# Patient Record
Sex: MALE | Race: WHITE | NOT HISPANIC OR LATINO | Employment: OTHER | URBAN - NONMETROPOLITAN AREA
[De-identification: names, ages, dates, MRNs, and addresses within clinical notes are randomized per-mention and may not be internally consistent; named-entity substitution may affect disease eponyms.]

---

## 2019-10-01 ENCOUNTER — HOSPITAL ENCOUNTER (EMERGENCY)
Facility: HOSPITAL | Age: 74
Discharge: HOME | End: 2019-10-01
Attending: FAMILY MEDICINE | Admitting: FAMILY MEDICINE

## 2019-10-01 ENCOUNTER — APPOINTMENT (OUTPATIENT)
Dept: GENERAL RADIOLOGY | Facility: HOSPITAL | Age: 74
End: 2019-10-01

## 2019-10-01 VITALS
BODY MASS INDEX: 32.96 KG/M2 | SYSTOLIC BLOOD PRESSURE: 151 MMHG | WEIGHT: 210 LBS | DIASTOLIC BLOOD PRESSURE: 78 MMHG | RESPIRATION RATE: 20 BRPM | OXYGEN SATURATION: 97 % | HEIGHT: 67 IN | TEMPERATURE: 98.3 F | HEART RATE: 86 BPM

## 2019-10-01 DIAGNOSIS — S61.313A LACERATION OF LEFT MIDDLE FINGER WITHOUT FOREIGN BODY WITH DAMAGE TO NAIL, INITIAL ENCOUNTER: Primary | ICD-10-CM

## 2019-10-01 PROCEDURE — 90471 IMMUNIZATION ADMIN: CPT | Performed by: STUDENT IN AN ORGANIZED HEALTH CARE EDUCATION/TRAINING PROGRAM

## 2019-10-01 PROCEDURE — 99283 EMERGENCY DEPT VISIT LOW MDM: CPT

## 2019-10-01 PROCEDURE — 90715 TDAP VACCINE 7 YRS/> IM: CPT | Performed by: STUDENT IN AN ORGANIZED HEALTH CARE EDUCATION/TRAINING PROGRAM

## 2019-10-01 PROCEDURE — 73140 X-RAY EXAM OF FINGER(S): CPT

## 2019-10-01 PROCEDURE — 25010000002 TDAP 5-2.5-18.5 LF-MCG/0.5 SUSPENSION: Performed by: STUDENT IN AN ORGANIZED HEALTH CARE EDUCATION/TRAINING PROGRAM

## 2019-10-01 RX ORDER — CEPHALEXIN 500 MG/1
500 CAPSULE ORAL 2 TIMES DAILY
Qty: 14 CAPSULE | Refills: 0 | Status: SHIPPED | OUTPATIENT
Start: 2019-10-01 | End: 2021-11-16 | Stop reason: HOSPADM

## 2019-10-01 RX ORDER — GINSENG 100 MG
CAPSULE ORAL 2 TIMES DAILY
Qty: 28 G | Refills: 0 | Status: SHIPPED | OUTPATIENT
Start: 2019-10-01 | End: 2022-02-10

## 2019-10-01 RX ORDER — DIAPER,BRIEF,INFANT-TODD,DISP
EACH MISCELLANEOUS
Status: COMPLETED
Start: 2019-10-01 | End: 2019-10-01

## 2019-10-01 RX ORDER — LIDOCAINE HYDROCHLORIDE 10 MG/ML
10 INJECTION, SOLUTION EPIDURAL; INFILTRATION; INTRACAUDAL; PERINEURAL ONCE
Status: COMPLETED | OUTPATIENT
Start: 2019-10-01 | End: 2019-10-01

## 2019-10-01 RX ORDER — DIAPER,BRIEF,INFANT-TODD,DISP
EACH MISCELLANEOUS ONCE
Status: COMPLETED | OUTPATIENT
Start: 2019-10-01 | End: 2019-10-01

## 2019-10-01 RX ORDER — LIDOCAINE HYDROCHLORIDE 10 MG/ML
INJECTION, SOLUTION EPIDURAL; INFILTRATION; INTRACAUDAL; PERINEURAL
Status: COMPLETED
Start: 2019-10-01 | End: 2019-10-01

## 2019-10-01 RX ADMIN — LIDOCAINE HYDROCHLORIDE 10 ML: 10 INJECTION, SOLUTION EPIDURAL; INFILTRATION; INTRACAUDAL at 17:46

## 2019-10-01 RX ADMIN — Medication: at 18:05

## 2019-10-01 RX ADMIN — TETANUS TOXOID, REDUCED DIPHTHERIA TOXOID AND ACELLULAR PERTUSSIS VACCINE, ADSORBED 0.5 ML: 5; 2.5; 8; 8; 2.5 SUSPENSION INTRAMUSCULAR at 18:00

## 2019-10-01 RX ADMIN — LIDOCAINE HYDROCHLORIDE 10 ML: 10 INJECTION, SOLUTION EPIDURAL; INFILTRATION; INTRACAUDAL; PERINEURAL at 17:46

## 2019-10-01 RX ADMIN — BACITRACIN ZINC: 500 OINTMENT TOPICAL at 18:05

## 2019-10-01 NOTE — ED PROVIDER NOTES
Subjective   History of Present Illness  Patient states he was using a saw and cut his two fingers ( middle and index finger) on his left hand. Patient states he does not have any difficulty moving his fingers. Patient has some numbness of his middle finger. Has not had tetanus shot in over 20 years.    Review of Systems   Constitutional: Negative for activity change, appetite change, chills, fatigue and fever.   HENT: Negative for drooling, ear discharge, ear pain, facial swelling, hearing loss, mouth sores, rhinorrhea and sinus pain.    Eyes: Negative for pain, redness and itching.   Respiratory: Negative for cough, choking, chest tightness, shortness of breath and stridor.    Cardiovascular: Negative for chest pain, palpitations and leg swelling.   Gastrointestinal: Negative for abdominal distention, abdominal pain, anal bleeding, blood in stool, constipation, diarrhea and nausea.   Endocrine: Negative for heat intolerance, polydipsia and polyphagia.   Genitourinary: Negative for dysuria, flank pain, frequency and genital sores.   Musculoskeletal: Negative for back pain, gait problem, joint swelling and myalgias.   Skin: Positive for wound. Negative for pallor and rash.   Neurological: Negative for seizures, facial asymmetry, speech difficulty, light-headedness, numbness and headaches.   Hematological: Negative for adenopathy. Does not bruise/bleed easily.   Psychiatric/Behavioral: Negative for confusion, decreased concentration, dysphoric mood and hallucinations. The patient is not nervous/anxious and is not hyperactive.        No past medical history on file.    Allergies   Allergen Reactions   • Levofloxacin Anaphylaxis       No past surgical history on file.    No family history on file.    Social History     Socioeconomic History   • Marital status:      Spouse name: Not on file   • Number of children: Not on file   • Years of education: Not on file   • Highest education level: Not on file            Objective   Physical Exam   Constitutional: He is oriented to person, place, and time. He appears well-developed and well-nourished.   HENT:   Head: Normocephalic and atraumatic.   Right Ear: External ear normal.   Left Ear: External ear normal.   Nose: Nose normal.   Mouth/Throat: Oropharynx is clear and moist.   Eyes: Conjunctivae and EOM are normal. Pupils are equal, round, and reactive to light.   Neck: Normal range of motion. Neck supple.   Cardiovascular: Normal rate, regular rhythm, normal heart sounds and intact distal pulses.   Pulmonary/Chest: Effort normal and breath sounds normal.   Abdominal: Soft. Bowel sounds are normal.   Musculoskeletal: Normal range of motion.   Neurological: He is alert and oriented to person, place, and time.   Skin: Skin is warm and dry.   Distal phalanx palmar aspect of index and middle finger cut   Psychiatric: He has a normal mood and affect. His behavior is normal. Judgment and thought content normal.       Laceration Repair  Date/Time: 10/14/2019 6:44 PM  Performed by: Kim Dobbs MD  Authorized by: Sebastian Carter MD     Consent:     Consent obtained:  Verbal    Consent given by:  Patient    Risks discussed:  Infection and pain    Alternatives discussed:  Delayed treatment, no treatment and observation  Anesthesia (see MAR for exact dosages):     Anesthesia method:  Local infiltration    Local anesthetic:  Lidocaine 1% w/o epi  Laceration details:     Location:  Finger    Finger location:  L long finger  Repair type:     Repair type:  Simple  Pre-procedure details:     Preparation:  Patient was prepped and draped in usual sterile fashion  Exploration:     Hemostasis achieved with:  Direct pressure    Wound exploration: wound explored through full range of motion      Contaminated: yes    Treatment:     Area cleansed with:  Saline    Amount of cleaning:  Standard    Irrigation solution:  Sterile saline    Irrigation method:  Pressure wash    Visualized  foreign bodies/material removed: no    Skin repair:     Repair method:  Sutures    Suture size:  4-0    Suture material:  Fast-absorbing gut    Suture technique:  Simple interrupted  Approximation:     Approximation:  Close    Vermilion border: well-aligned    Post-procedure details:     Dressing:  Antibiotic ointment    Patient tolerance of procedure:  Tolerated well, no immediate complications               ED Course  ED Course as of Oct 02 1031   Wed Oct 02, 2019   1030 Applied 3-4 stiches per finger. Patient tolerated the procedure well. No complications. Told patient to follow up with dr light within one week. Given patient tetanus shot, bacitracin, and kephlex.   [SA]      ED Course User Index  [SA] Kim Dobbs MD                  [facility]    Final diagnoses:   Laceration of left middle finger without foreign body with damage to nail, initial encounter          Kim Dobbs M.D. PGY2  [facility]  [address] [place] [address]  Office: [phone]    This document has been electronically signed by Kim Dobbs MD on October 2, 2019 10:31 AM             Kim Dobbs MD  Resident  10/02/19 1032       Kim Dobbs MD  Resident  10/14/19 4112

## 2019-10-01 NOTE — ED NOTES
Fingers soaking in normal saline and betadine soak basin.      Gabriela Singh, RN  10/01/19 5565

## 2019-10-01 NOTE — ED NOTES
Sutures cleaned with normal saline.  Bacitracin applied.  Gauze and coban dressing to cover.     Gabriela Singh RN  10/01/19 0356

## 2020-03-09 ENCOUNTER — APPOINTMENT (OUTPATIENT)
Dept: GENERAL RADIOLOGY | Facility: HOSPITAL | Age: 75
End: 2020-03-09

## 2020-03-09 ENCOUNTER — HOSPITAL ENCOUNTER (EMERGENCY)
Facility: HOSPITAL | Age: 75
Discharge: ANOTHER HOSPITAL | End: 2020-03-09
Attending: EMERGENCY MEDICINE | Admitting: EMERGENCY MEDICINE

## 2020-03-09 ENCOUNTER — APPOINTMENT (OUTPATIENT)
Dept: CT IMAGING | Facility: HOSPITAL | Age: 75
End: 2020-03-09

## 2020-03-09 VITALS
OXYGEN SATURATION: 96 % | WEIGHT: 198 LBS | HEIGHT: 67 IN | DIASTOLIC BLOOD PRESSURE: 60 MMHG | SYSTOLIC BLOOD PRESSURE: 124 MMHG | TEMPERATURE: 98.3 F | HEART RATE: 102 BPM | RESPIRATION RATE: 20 BRPM | BODY MASS INDEX: 31.08 KG/M2

## 2020-03-09 DIAGNOSIS — Z94.1 HISTORY OF HEART TRANSPLANT (HCC): ICD-10-CM

## 2020-03-09 DIAGNOSIS — R65.20 SEPSIS WITH ACUTE RENAL FAILURE WITHOUT SEPTIC SHOCK, DUE TO UNSPECIFIED ORGANISM, UNSPECIFIED ACUTE RENAL FAILURE TYPE (HCC): ICD-10-CM

## 2020-03-09 DIAGNOSIS — N17.9 SEPSIS WITH ACUTE RENAL FAILURE WITHOUT SEPTIC SHOCK, DUE TO UNSPECIFIED ORGANISM, UNSPECIFIED ACUTE RENAL FAILURE TYPE (HCC): ICD-10-CM

## 2020-03-09 DIAGNOSIS — K45.0 INCARCERATED HERNIA OF ABDOMINAL CAVITY: Primary | ICD-10-CM

## 2020-03-09 DIAGNOSIS — A41.9 SEPSIS WITH ACUTE RENAL FAILURE WITHOUT SEPTIC SHOCK, DUE TO UNSPECIFIED ORGANISM, UNSPECIFIED ACUTE RENAL FAILURE TYPE (HCC): ICD-10-CM

## 2020-03-09 LAB
ALBUMIN SERPL-MCNC: 4.4 G/DL (ref 3.5–5.2)
ALBUMIN/GLOB SERPL: 1 G/DL
ALP SERPL-CCNC: 77 U/L (ref 39–117)
ALT SERPL W P-5'-P-CCNC: 10 U/L (ref 1–41)
ANION GAP SERPL CALCULATED.3IONS-SCNC: 23 MMOL/L (ref 5–15)
AST SERPL-CCNC: 23 U/L (ref 1–40)
BACTERIA UR QL AUTO: ABNORMAL /HPF
BASOPHILS # BLD AUTO: 0.03 10*3/MM3 (ref 0–0.2)
BASOPHILS NFR BLD AUTO: 0.3 % (ref 0–1.5)
BILIRUB SERPL-MCNC: 0.9 MG/DL (ref 0.2–1.2)
BILIRUB UR QL STRIP: NEGATIVE
BUN BLD-MCNC: 69 MG/DL (ref 8–23)
BUN/CREAT SERPL: 18.8 (ref 7–25)
CALCIUM SPEC-SCNC: 9.3 MG/DL (ref 8.6–10.5)
CHLORIDE SERPL-SCNC: 87 MMOL/L (ref 98–107)
CLARITY UR: CLEAR
CO2 SERPL-SCNC: 29 MMOL/L (ref 22–29)
COLOR UR: YELLOW
CREAT BLD-MCNC: 3.67 MG/DL (ref 0.76–1.27)
D-LACTATE SERPL-SCNC: 4 MMOL/L (ref 0.5–2)
DEPRECATED RDW RBC AUTO: 42.3 FL (ref 37–54)
DOHLE BODIES: PRESENT
EOSINOPHIL # BLD AUTO: 0.02 10*3/MM3 (ref 0–0.4)
EOSINOPHIL NFR BLD AUTO: 0.2 % (ref 0.3–6.2)
ERYTHROCYTE [DISTWIDTH] IN BLOOD BY AUTOMATED COUNT: 13.7 % (ref 12.3–15.4)
GFR SERPL CREATININE-BSD FRML MDRD: 16 ML/MIN/1.73
GLOBULIN UR ELPH-MCNC: 4.5 GM/DL
GLUCOSE BLD-MCNC: 199 MG/DL (ref 65–99)
GLUCOSE UR STRIP-MCNC: ABNORMAL MG/DL
HCT VFR BLD AUTO: 38.2 % (ref 37.5–51)
HGB BLD-MCNC: 12.7 G/DL (ref 13–17.7)
HGB UR QL STRIP.AUTO: NEGATIVE
HOLD SPECIMEN: NORMAL
HOLD SPECIMEN: NORMAL
HYALINE CASTS UR QL AUTO: ABNORMAL /LPF
IMM GRANULOCYTES # BLD AUTO: 0.09 10*3/MM3 (ref 0–0.05)
IMM GRANULOCYTES NFR BLD AUTO: 0.8 % (ref 0–0.5)
KETONES UR QL STRIP: NEGATIVE
LACTATE HOLD SPECIMEN: NORMAL
LEUKOCYTE ESTERASE UR QL STRIP.AUTO: NEGATIVE
LIPASE SERPL-CCNC: 8 U/L (ref 13–60)
LYMPHOCYTES # BLD AUTO: 1.5 10*3/MM3 (ref 0.7–3.1)
LYMPHOCYTES # BLD MANUAL: 1.86 10*3/MM3 (ref 0.7–3.1)
LYMPHOCYTES NFR BLD AUTO: 12.9 % (ref 19.6–45.3)
LYMPHOCYTES NFR BLD MANUAL: 14 % (ref 5–12)
LYMPHOCYTES NFR BLD MANUAL: 16 % (ref 19.6–45.3)
MCH RBC QN AUTO: 28 PG (ref 26.6–33)
MCHC RBC AUTO-ENTMCNC: 33.2 G/DL (ref 31.5–35.7)
MCV RBC AUTO: 84.3 FL (ref 79–97)
MONOCYTES # BLD AUTO: 1.52 10*3/MM3 (ref 0.1–0.9)
MONOCYTES # BLD AUTO: 1.63 10*3/MM3 (ref 0.1–0.9)
MONOCYTES NFR BLD AUTO: 13 % (ref 5–12)
NEUTROPHILS # BLD AUTO: 8.16 10*3/MM3 (ref 1.7–7)
NEUTROPHILS # BLD AUTO: 8.49 10*3/MM3 (ref 1.7–7)
NEUTROPHILS NFR BLD AUTO: 72.8 % (ref 42.7–76)
NEUTROPHILS NFR BLD MANUAL: 45 % (ref 42.7–76)
NEUTS BAND NFR BLD MANUAL: 25 % (ref 0–5)
NITRITE UR QL STRIP: NEGATIVE
NRBC BLD AUTO-RTO: 0 /100 WBC (ref 0–0.2)
NT-PROBNP SERPL-MCNC: 3224 PG/ML (ref 5–1800)
PH UR STRIP.AUTO: 6.5 [PH] (ref 5–9)
PLAT MORPH BLD: NORMAL
PLATELET # BLD AUTO: 235 10*3/MM3 (ref 140–450)
PMV BLD AUTO: 9.7 FL (ref 6–12)
POTASSIUM BLD-SCNC: 3.3 MMOL/L (ref 3.5–5.2)
PROT SERPL-MCNC: 8.9 G/DL (ref 6–8.5)
PROT UR QL STRIP: ABNORMAL
RBC # BLD AUTO: 4.53 10*6/MM3 (ref 4.14–5.8)
RBC # UR: ABNORMAL /HPF
RBC MORPH BLD: NORMAL
REF LAB TEST METHOD: ABNORMAL
SODIUM BLD-SCNC: 139 MMOL/L (ref 136–145)
SP GR UR STRIP: 1.02 (ref 1–1.03)
SQUAMOUS #/AREA URNS HPF: ABNORMAL /HPF
TOXIC GRANULATION: ABNORMAL
TROPONIN T SERPL-MCNC: 0.03 NG/ML (ref 0–0.03)
UROBILINOGEN UR QL STRIP: ABNORMAL
WBC NRBC COR # BLD: 11.65 10*3/MM3 (ref 3.4–10.8)
WBC UR QL AUTO: ABNORMAL /HPF
WHOLE BLOOD HOLD SPECIMEN: NORMAL
WHOLE BLOOD HOLD SPECIMEN: NORMAL

## 2020-03-09 PROCEDURE — 25010000002 MORPHINE PER 10 MG: Performed by: EMERGENCY MEDICINE

## 2020-03-09 PROCEDURE — P9612 CATHETERIZE FOR URINE SPEC: HCPCS

## 2020-03-09 PROCEDURE — 96365 THER/PROPH/DIAG IV INF INIT: CPT

## 2020-03-09 PROCEDURE — 74176 CT ABD & PELVIS W/O CONTRAST: CPT

## 2020-03-09 PROCEDURE — 84484 ASSAY OF TROPONIN QUANT: CPT | Performed by: EMERGENCY MEDICINE

## 2020-03-09 PROCEDURE — 96375 TX/PRO/DX INJ NEW DRUG ADDON: CPT

## 2020-03-09 PROCEDURE — 99285 EMERGENCY DEPT VISIT HI MDM: CPT

## 2020-03-09 PROCEDURE — 83605 ASSAY OF LACTIC ACID: CPT | Performed by: EMERGENCY MEDICINE

## 2020-03-09 PROCEDURE — 81001 URINALYSIS AUTO W/SCOPE: CPT | Performed by: EMERGENCY MEDICINE

## 2020-03-09 PROCEDURE — 83880 ASSAY OF NATRIURETIC PEPTIDE: CPT | Performed by: EMERGENCY MEDICINE

## 2020-03-09 PROCEDURE — 80053 COMPREHEN METABOLIC PANEL: CPT | Performed by: EMERGENCY MEDICINE

## 2020-03-09 PROCEDURE — 83690 ASSAY OF LIPASE: CPT | Performed by: EMERGENCY MEDICINE

## 2020-03-09 PROCEDURE — 25010000002 PIPERACILLIN SOD-TAZOBACTAM PER 1 G: Performed by: EMERGENCY MEDICINE

## 2020-03-09 PROCEDURE — 85007 BL SMEAR W/DIFF WBC COUNT: CPT | Performed by: EMERGENCY MEDICINE

## 2020-03-09 PROCEDURE — 25010000002 ONDANSETRON PER 1 MG: Performed by: EMERGENCY MEDICINE

## 2020-03-09 PROCEDURE — 71045 X-RAY EXAM CHEST 1 VIEW: CPT

## 2020-03-09 PROCEDURE — 85025 COMPLETE CBC W/AUTO DIFF WBC: CPT | Performed by: EMERGENCY MEDICINE

## 2020-03-09 PROCEDURE — 87040 BLOOD CULTURE FOR BACTERIA: CPT | Performed by: EMERGENCY MEDICINE

## 2020-03-09 PROCEDURE — 96361 HYDRATE IV INFUSION ADD-ON: CPT

## 2020-03-09 RX ORDER — ONDANSETRON 2 MG/ML
4 INJECTION INTRAMUSCULAR; INTRAVENOUS ONCE
Status: COMPLETED | OUTPATIENT
Start: 2020-03-09 | End: 2020-03-09

## 2020-03-09 RX ORDER — EZETIMIBE 10 MG/1
10 TABLET ORAL NIGHTLY
COMMUNITY
Start: 2018-10-04

## 2020-03-09 RX ORDER — HYDRALAZINE HYDROCHLORIDE 10 MG/1
10 TABLET, FILM COATED ORAL 3 TIMES DAILY
COMMUNITY
End: 2022-02-10

## 2020-03-09 RX ORDER — INSULIN GLARGINE 100 [IU]/ML
30 INJECTION, SOLUTION SUBCUTANEOUS 2 TIMES DAILY
COMMUNITY
Start: 2018-10-04

## 2020-03-09 RX ORDER — METOPROLOL SUCCINATE 50 MG/1
50 TABLET, EXTENDED RELEASE ORAL DAILY
COMMUNITY
Start: 2018-10-05

## 2020-03-09 RX ORDER — CYCLOSPORINE 100 MG/1
100 CAPSULE, LIQUID FILLED ORAL 2 TIMES DAILY
COMMUNITY

## 2020-03-09 RX ORDER — DOCUSATE SODIUM 100 MG/1
100 CAPSULE, LIQUID FILLED ORAL AS NEEDED
COMMUNITY

## 2020-03-09 RX ORDER — DOXAZOSIN 8 MG/1
8 TABLET ORAL 2 TIMES DAILY
COMMUNITY

## 2020-03-09 RX ORDER — GABAPENTIN 100 MG/1
200 CAPSULE ORAL 3 TIMES DAILY
COMMUNITY
Start: 2018-10-04

## 2020-03-09 RX ORDER — SULFAMETHOXAZOLE AND TRIMETHOPRIM 800; 160 MG/1; MG/1
1 TABLET ORAL WEEKLY
Status: ON HOLD | COMMUNITY
End: 2021-12-17

## 2020-03-09 RX ORDER — CITALOPRAM 20 MG/1
20 TABLET ORAL DAILY
COMMUNITY
Start: 2018-10-04 | End: 2022-02-10

## 2020-03-09 RX ORDER — ISOSORBIDE DINITRATE 20 MG/1
20 TABLET ORAL 3 TIMES DAILY
COMMUNITY
Start: 2018-10-04

## 2020-03-09 RX ORDER — FUROSEMIDE 40 MG/1
40 TABLET ORAL DAILY
Status: ON HOLD | COMMUNITY
End: 2021-12-17 | Stop reason: SDUPTHER

## 2020-03-09 RX ORDER — ROSUVASTATIN CALCIUM 5 MG/1
5 TABLET, COATED ORAL NIGHTLY
COMMUNITY
End: 2022-02-12 | Stop reason: HOSPADM

## 2020-03-09 RX ORDER — CHLORAL HYDRATE 500 MG
1 CAPSULE ORAL 2 TIMES DAILY WITH MEALS
COMMUNITY
Start: 2018-10-04

## 2020-03-09 RX ORDER — CALCITRIOL 0.25 UG/1
0.25 CAPSULE, LIQUID FILLED ORAL EVERY OTHER DAY
COMMUNITY

## 2020-03-09 RX ORDER — SODIUM CHLORIDE 0.9 % (FLUSH) 0.9 %
10 SYRINGE (ML) INJECTION AS NEEDED
Status: DISCONTINUED | OUTPATIENT
Start: 2020-03-09 | End: 2020-03-09 | Stop reason: HOSPADM

## 2020-03-09 RX ORDER — AMLODIPINE BESYLATE 10 MG/1
5 TABLET ORAL DAILY
COMMUNITY

## 2020-03-09 RX ORDER — SODIUM CHLORIDE 9 MG/ML
125 INJECTION, SOLUTION INTRAVENOUS CONTINUOUS
Status: DISCONTINUED | OUTPATIENT
Start: 2020-03-09 | End: 2020-03-09 | Stop reason: HOSPADM

## 2020-03-09 RX ORDER — ASPIRIN 81 MG/1
81 TABLET, CHEWABLE ORAL DAILY
Status: ON HOLD | COMMUNITY
Start: 2018-10-05 | End: 2022-02-12 | Stop reason: SDUPTHER

## 2020-03-09 RX ADMIN — SODIUM CHLORIDE 500 ML: 9 INJECTION, SOLUTION INTRAVENOUS at 14:50

## 2020-03-09 RX ADMIN — MORPHINE SULFATE 4 MG: 4 INJECTION, SOLUTION INTRAMUSCULAR; INTRAVENOUS at 14:51

## 2020-03-09 RX ADMIN — PIPERACILLIN SODIUM AND TAZOBACTAM SODIUM 3.38 G: 3; .375 INJECTION, POWDER, LYOPHILIZED, FOR SOLUTION INTRAVENOUS at 15:55

## 2020-03-09 RX ADMIN — SODIUM CHLORIDE 2694 ML: 9 INJECTION, SOLUTION INTRAVENOUS at 15:23

## 2020-03-09 RX ADMIN — ONDANSETRON 4 MG: 2 INJECTION INTRAMUSCULAR; INTRAVENOUS at 14:50

## 2020-03-09 NOTE — ED TRIAGE NOTES
Pt states he has been vomiting all weekend and a periumbilical hernia that is protruding more than normal. Pt has a minor bruise to periumbilical area.

## 2020-03-09 NOTE — ED NOTES
Patient is a  and is seen at [facility].  [facility] however the MD is recommending patient return to the hospital where he had a heart transplant which he said was [facility] due to patient being high risk..  He has not been taking his medications for 4-5 days.  Dr Espinal stated he needs to get there asap and is asking for air transport.

## 2020-03-09 NOTE — ED PROVIDER NOTES
Subjective   74-year-old male presents the emergency department accompanied by family with concern for abdominal pain, and vomiting.  Patient has history of heart transplant approximately 19 years ago.  Continues on immunosuppressive medications however he has been unable to take any of his medicines other than his insulin for the last 4 days.  Reports that he cannot keep anything down.  States that he has a known umbilical hernia that fixed itself a couple years ago and has not had any other problems but today has bulging in the area of his umbilicus with redness around the abdomen and significant pain and tenderness.  States that it has been 4 days since he has passed any gas or stool. Denies fever, chest pain or shortness of breath.     Family history, surgical history, social history, current medications and allergies are reviewed with the patient and triage documentation and vitals are reviewed.      History provided by:  Patient and relative   used: No        Review of Systems   Constitutional: Positive for appetite change. Negative for chills and fever.   HENT: Negative.    Eyes: Negative.    Respiratory: Negative for cough, shortness of breath and wheezing.    Cardiovascular: Negative for chest pain, palpitations and leg swelling.   Gastrointestinal: Positive for abdominal distention, abdominal pain, constipation and nausea. Negative for diarrhea and vomiting.   Endocrine: Negative for polydipsia, polyphagia and polyuria.   Genitourinary: Negative for dysuria, frequency and urgency.   Musculoskeletal: Negative for arthralgias, back pain, myalgias and neck pain.   Skin: Positive for color change (erythema on abdomen). Negative for pallor, rash and wound.   Allergic/Immunologic: Negative.    Neurological: Negative.    Hematological: Negative.    Psychiatric/Behavioral: Negative.        Past Medical History:   Diagnosis Date   • Diabetes mellitus (CMS/[facility])        Allergies   Allergen  Reactions   • Levofloxacin Anaphylaxis       Past Surgical History:   Procedure Laterality Date   • HEART TRANSPLANT         History reviewed. No pertinent family history.    Social History     Socioeconomic History   • Marital status:      Spouse name: Not on file   • Number of children: Not on file   • Years of education: Not on file   • Highest education level: Not on file   Tobacco Use   • Smoking status: Former Smoker   • Smokeless tobacco: Former User   Substance and Sexual Activity   • Alcohol use: Never     Frequency: Never   • Drug use: Never           Objective   Physical Exam   Constitutional: He is oriented to person, place, and time. He appears well-developed and well-nourished. He appears ill. No distress.   HENT:   Head: Normocephalic.   Mouth/Throat: Oropharynx is clear and moist.   Eyes: Pupils are equal, round, and reactive to light. No scleral icterus.   Cardiovascular: Intact distal pulses.  No extrasystoles are present. Tachycardia present. Exam reveals no gallop and no friction rub.   No murmur heard.  Pulses:       Radial pulses are 2+ on the right side, and 2+ on the left side.        Dorsalis pedis pulses are 2+ on the right side, and 2+ on the left side.   Pulmonary/Chest: Effort normal and breath sounds normal. He has no wheezes. He has no rales.   Abdominal: He exhibits distension. Bowel sounds are absent. There is no hepatosplenomegaly. There is generalized tenderness and tenderness in the periumbilical area. There is no rigidity, no rebound and no guarding. A hernia (umbilical) is present.   Neurological: He is alert and oriented to person, place, and time.   Skin: Skin is warm. Capillary refill takes less than 2 seconds. He is diaphoretic. There is erythema (on abdomen).   Psychiatric: His mood appears anxious.   Nursing note and vitals reviewed.      Procedures  none         ED Course      Labs Reviewed   COMPREHENSIVE METABOLIC PANEL - Abnormal; Notable for the following  components:       Result Value    Glucose 199 (*)     BUN 69 (*)     Creatinine 3.67 (*)     Potassium 3.3 (*)     Chloride 87 (*)     Total Protein 8.9 (*)     eGFR Non  Amer 16 (*)     Anion Gap 23.0 (*)     All other components within normal limits    Narrative:     GFR Normal >60  Chronic Kidney Disease <60  Kidney Failure <15     LIPASE - Abnormal; Notable for the following components:    Lipase 8 (*)     All other components within normal limits   URINALYSIS W/ MICROSCOPIC IF INDICATED (NO CULTURE) - Abnormal; Notable for the following components:    Glucose,  mg/dL (Trace) (*)     Protein, UA >=300 mg/dL (3+) (*)     All other components within normal limits   CBC WITH AUTO DIFFERENTIAL - Abnormal; Notable for the following components:    WBC 11.65 (*)     Hemoglobin 12.7 (*)     Lymphocyte % 12.9 (*)     Monocyte % 13.0 (*)     Eosinophil % 0.2 (*)     Immature Grans % 0.8 (*)     Neutrophils, Absolute 8.49 (*)     Monocytes, Absolute 1.52 (*)     Immature Grans, Absolute 0.09 (*)     All other components within normal limits   LACTIC ACID, PLASMA - Abnormal; Notable for the following components:    Lactate 4.0 (*)     All other components within normal limits   BNP (IN-HOUSE) - Abnormal; Notable for the following components:    proBNP 3,224.0 (*)     All other components within normal limits    Narrative:     Among patients with dyspnea, NT-proBNP is highly sensitive for the detection of acute congestive heart failure. In addition NT-proBNP of <300 pg/ml effectively rules out acute congestive heart failure with 99% negative predictive value.    Results may be falsely decreased if patient taking Biotin.     URINALYSIS, MICROSCOPIC ONLY - Abnormal; Notable for the following components:    RBC, UA 3-5 (*)     All other components within normal limits   MANUAL DIFFERENTIAL - Abnormal; Notable for the following components:    Lymphocyte % 16.0 (*)     Monocyte % 14.0 (*)     Bands %  25.0 (*)      Neutrophils Absolute 8.16 (*)     Monocytes Absolute 1.63 (*)     All other components within normal limits   TROPONIN (IN-HOUSE) - Normal    Narrative:     Troponin T Reference Range:  <= 0.03 ng/mL-   Negative for AMI  >0.03 ng/mL-     Abnormal for myocardial necrosis.  Clinicians would have to utilize clinical acumen, EKG, Troponin and serial changes to determine if it is an Acute Myocardial Infarction or myocardial injury due to an underlying chronic condition.       Results may be falsely decreased if patient taking Biotin.     BLOOD CULTURE   RAINBOW DRAW    Narrative:     The following orders were created for panel order [place] Draw.  Procedure                               Abnormality         Status                     ---------                               -----------         ------                     Light Blue Top[[id number]]                                   Final result               Green Top (Gel)[[id number]]                                  Final result               Lavender Top[[id number]]                                     Final result               Gold Top - SST[[id number]]                                   Final result                 Please view results for these tests on the individual orders.   LACTIC ACID REFLEX TIMER   CBC AND DIFFERENTIAL    Narrative:     The following orders were created for panel order CBC & Differential.  Procedure                               Abnormality         Status                     ---------                               -----------         ------                     CBC Auto Differential[[id number]]        Abnormal            Final result                 Please view results for these tests on the individual orders.   LIGHT BLUE TOP   GREEN TOP   LAVENDER TOP   GOLD TOP - SST     Ct Abdomen Pelvis Without Contrast    Result Date: 3/9/2020  Narrative: [facility] Patient Name: GOLD CARTER ATTENDING: WILL FRANCISCO REFERRING:  WILL FRANCISCO ORDERING: WILL FRANCISCO ----------------------- PROCEDURE: CT abdomen and pelvis without contrast DATE OF EXAM: 3/9/2020 HISTORY: Abdominal pain with vomiting, umbilical hernia. Axial images were obtained throughout the abdomen and pelvis without the use of intravenous and oral contrast. Reformatted sagittal and coronal image series were generated. This exam was performed according to our departmental dose-optimization program, which includes automated exposure control, adjustment of the mA and/or kV according to the patient's size and/or use of iterative reconstruction techniques with goal of optimizing doses that are As Low As Reasonably Achievable (ALARA).  No previous studies are available for comparison. FINDINGS: The visualized lungs bases are clear of infiltrates or effusions. The liver and spleen appear  homogenous and unremarkable.The kidneys show no renal stones or  calcifications and there is no evidence of hydronephrosis. The  pancreas and adrenals appear normal. The gallbladder is unremarkable. No biliary or pancreatic duct dilatation is seen. There is a periumbilical hernia containing a loop of small bowel. There apparently is a partial obstruction as the proximal loops appear fluid-filled and mildly distended. The loops distal to the hernia are not distended. No free fluid or inflammatory changes are seen in the abdomen. Diverticula are seen throughout the colon, most pronounced in the sigmoid colon. No acute inflammatory changes are seen. There is no evidence of retroperitoneal adenopathy. Appendix is normal. The abdominal aorta is unremarkable. The  pelvis shows no masses or adenopathy. Bladder and seminal vesicles appear normal. Prostate is not enlarged. No free fluid is seen. The inguinal regions are clear.     Impression: 1. Periumbilical hernia containing a loop of small bowel. There is dilatation of the small bowel loops proximal to the hernia, appearing fluid-filled with  air-fluid levels suggesting a partial obstruction. Distal small bowel and colon are nondistended. 2. Diverticulosis most pronounced in the sigmoid region. No inflammation or acute abnormalities are seen. Electronically signed by:  Saurav Wood MD  3/9/2020 4:20 PM CDT Workstation: [id number]    Xr Chest 1 View    Result Date: 3/9/2020  Narrative: PROCEDURE: XR CHEST 1 VW VIEWS:Single INDICATION: Abdominal pain, vomiting COMPARISON: None FINDINGS:   - lines/tubes: None. Median sternotomy wires are present. The superiormost wire is discontinuous   - cardiac: Size within normal limits.   - mediastinum: Contour within normal limits.   - lungs: No evidence of a focal air space process, pulmonary interstitial edema, nodule(s)/mass. Linear opacity left lung base probably represents small area of atelectasis or scarring   - pleura: No evidence of  fluid.    - osseous: Unremarkable for age.     Impression: Minimal suspected atelectasis or scarring at left lung base Electronically signed by:  Cadence Rodriguez MD  3/9/2020 4:30 PM CDT Workstation: [id number]          MDM  Number of Diagnoses or Management Options  History of heart transplant (CMS/[facility]):   Incarcerated hernia of abdominal cavity:   Sepsis with acute renal failure without septic shock, due to unspecified organism, unspecified acute renal failure type (CMS/HCC):      Amount and/or Complexity of Data Reviewed  Clinical lab tests: reviewed  Tests in the radiology section of CPT®: reviewed  Discuss the patient with other providers: yes    Patient Progress  Patient progress: stable    Patient's pain and nausea improved with medications in the emergency department.  With concern for infection and possible incarcerated hernia patient is started on IV antibiotics meeting sepsis criteria.  Lactic acid is 4 and patient is given 30 mL/kg fluid bolus.  Blood pressures remained stable.  CT scan reveals early partial small bowel obstruction with piece of small bowel and air within  the hernia.  With patient's presentation and findings concerning for incarcerated hernia.  Spoke with Dr. Trejo on-call for general surgery who is concerned about patient's history of heart transplant and lack of medications over the last 4 days.  Patient is felt stable enough for transfer to have surgical procedure at his facility where he follows for his heart transplant.  Contacted [facility] and spoke with  and patient was accepted to the emergency department under the care of Dr. Cam Leyva.  Patient and family are agreeable to transfer.  Due to patient's critical condition and multiple transfers at time of his need for transfer ambulances are not available and patient is transferred via helicopter.    Final diagnoses:   Incarcerated hernia of abdominal cavity   Sepsis with acute renal failure without septic shock, due to unspecified organism, unspecified acute renal failure type (CMS/[facility])   History of heart transplant (CMS/[facility])            Sandor Espinal,   03/09/20 1806    SEPTIC SHOCK FOCUSED EXAM    *Must be completed by a licensed independent Practitioner within 6 hours of diagnosis for the following conditions -- Septic Shock or Severe Sepsis with Lactate >/= 4.    Fluid bolus must be completed prior to assessment.      Diagnosis: severe sepsis     Vital Signs (Attestation) Reviewed Temp, HR, RR, BP, SpO2   Shock Index (HR/SBP) (Attestation) Reviewed    Urine Output (Attestation) Reviewed   General ill appearing   Respiratory Within Normal Limits   Cardiac/Chest no edema, no gallop, no JVD, no murmur and tachycardia   Skin (documentation of skin color is required) pink mucosa and warm/dry   Capillary Refill <3 seconds   Peripheral Pulses Checked                          radial  palpable and dorsal-pedis  palpable     † Septic shock is defined by CMS as severe sepsis plus one of the following: persistent hypotension after fluid bolus OR tissue hypoperfusion (Lactic Acid ?4)  ††  ONE OF THE FOLLOWING can be done in lieu of the Focused Exam: Measure CVP; Measure ScVO2; Echocardiogram (cardiac echo or cardiac ultrasound); Dynamic assessment of fluid responsiveness with passive leg raise or fluid challenge.    Sandor Espinal DO  03/09/20  6:07 PM       Sandor Espinal,   03/09/20 1800

## 2020-03-14 LAB — BACTERIA SPEC AEROBE CULT: NORMAL

## 2021-02-08 ENCOUNTER — IMMUNIZATION (OUTPATIENT)
Dept: VACCINE CLINIC | Facility: HOSPITAL | Age: 76
End: 2021-02-08

## 2021-02-08 PROCEDURE — 0001A: CPT | Performed by: THORACIC SURGERY (CARDIOTHORACIC VASCULAR SURGERY)

## 2021-02-08 PROCEDURE — 91300 HC SARSCOV02 VAC 30MCG/0.3ML IM: CPT | Performed by: THORACIC SURGERY (CARDIOTHORACIC VASCULAR SURGERY)

## 2021-03-01 ENCOUNTER — IMMUNIZATION (OUTPATIENT)
Dept: VACCINE CLINIC | Facility: HOSPITAL | Age: 76
End: 2021-03-01

## 2021-03-01 PROCEDURE — 0002A: CPT | Performed by: THORACIC SURGERY (CARDIOTHORACIC VASCULAR SURGERY)

## 2021-03-01 PROCEDURE — 91300 HC SARSCOV02 VAC 30MCG/0.3ML IM: CPT | Performed by: THORACIC SURGERY (CARDIOTHORACIC VASCULAR SURGERY)

## 2021-11-15 ENCOUNTER — APPOINTMENT (OUTPATIENT)
Dept: GENERAL RADIOLOGY | Facility: HOSPITAL | Age: 76
End: 2021-11-15

## 2021-11-15 ENCOUNTER — HOSPITAL ENCOUNTER (OUTPATIENT)
Facility: HOSPITAL | Age: 76
Setting detail: OBSERVATION
Discharge: HOME | End: 2021-11-16
Attending: EMERGENCY MEDICINE | Admitting: INTERNAL MEDICINE

## 2021-11-15 ENCOUNTER — APPOINTMENT (OUTPATIENT)
Dept: ULTRASOUND IMAGING | Facility: HOSPITAL | Age: 76
End: 2021-11-15

## 2021-11-15 ENCOUNTER — APPOINTMENT (OUTPATIENT)
Dept: CT IMAGING | Facility: HOSPITAL | Age: 76
End: 2021-11-15

## 2021-11-15 DIAGNOSIS — R42 ORTHOSTATIC DIZZINESS: ICD-10-CM

## 2021-11-15 DIAGNOSIS — R55 NEAR SYNCOPE: Primary | ICD-10-CM

## 2021-11-15 DIAGNOSIS — R51.0 ORTHOSTATIC HEADACHE: ICD-10-CM

## 2021-11-15 LAB
ALBUMIN SERPL-MCNC: 4.2 G/DL (ref 3.5–5.2)
ALBUMIN/GLOB SERPL: 1.2 G/DL
ALP SERPL-CCNC: 94 U/L (ref 39–117)
ALT SERPL W P-5'-P-CCNC: 12 U/L (ref 1–41)
ANION GAP SERPL CALCULATED.3IONS-SCNC: 12 MMOL/L (ref 5–15)
AST SERPL-CCNC: 19 U/L (ref 1–40)
BACTERIA UR QL AUTO: ABNORMAL /HPF
BASOPHILS # BLD AUTO: 0.04 10*3/MM3 (ref 0–0.2)
BASOPHILS NFR BLD AUTO: 0.6 % (ref 0–1.5)
BILIRUB SERPL-MCNC: 0.2 MG/DL (ref 0–1.2)
BILIRUB UR QL STRIP: NEGATIVE
BUN SERPL-MCNC: 38 MG/DL (ref 8–23)
BUN/CREAT SERPL: 16.5 (ref 7–25)
CALCIUM SPEC-SCNC: 8.7 MG/DL (ref 8.6–10.5)
CHLORIDE SERPL-SCNC: 100 MMOL/L (ref 98–107)
CLARITY UR: CLEAR
CO2 SERPL-SCNC: 25 MMOL/L (ref 22–29)
COLOR UR: YELLOW
CREAT SERPL-MCNC: 2.31 MG/DL (ref 0.76–1.27)
DEPRECATED RDW RBC AUTO: 37.2 FL (ref 37–54)
EOSINOPHIL # BLD AUTO: 0.1 10*3/MM3 (ref 0–0.4)
EOSINOPHIL NFR BLD AUTO: 1.6 % (ref 0.3–6.2)
ERYTHROCYTE [DISTWIDTH] IN BLOOD BY AUTOMATED COUNT: 12.8 % (ref 12.3–15.4)
FLUAV SUBTYP SPEC NAA+PROBE: NOT DETECTED
FLUBV RNA ISLT QL NAA+PROBE: NOT DETECTED
GFR SERPL CREATININE-BSD FRML MDRD: 28 ML/MIN/1.73
GLOBULIN UR ELPH-MCNC: 3.4 GM/DL
GLUCOSE BLDC GLUCOMTR-MCNC: 247 MG/DL (ref 70–130)
GLUCOSE SERPL-MCNC: 301 MG/DL (ref 65–99)
GLUCOSE UR STRIP-MCNC: ABNORMAL MG/DL
HCT VFR BLD AUTO: 32.9 % (ref 37.5–51)
HGB BLD-MCNC: 11 G/DL (ref 13–17.7)
HGB UR QL STRIP.AUTO: NEGATIVE
HOLD SPECIMEN: NORMAL
HYALINE CASTS UR QL AUTO: ABNORMAL /LPF
IMM GRANULOCYTES # BLD AUTO: 0.03 10*3/MM3 (ref 0–0.05)
IMM GRANULOCYTES NFR BLD AUTO: 0.5 % (ref 0–0.5)
KETONES UR QL STRIP: NEGATIVE
LEUKOCYTE ESTERASE UR QL STRIP.AUTO: NEGATIVE
LYMPHOCYTES # BLD AUTO: 1.79 10*3/MM3 (ref 0.7–3.1)
LYMPHOCYTES NFR BLD AUTO: 28.9 % (ref 19.6–45.3)
MAGNESIUM SERPL-MCNC: 1.8 MG/DL (ref 1.6–2.4)
MCH RBC QN AUTO: 27 PG (ref 26.6–33)
MCHC RBC AUTO-ENTMCNC: 33.4 G/DL (ref 31.5–35.7)
MCV RBC AUTO: 80.8 FL (ref 79–97)
MONOCYTES # BLD AUTO: 0.53 10*3/MM3 (ref 0.1–0.9)
MONOCYTES NFR BLD AUTO: 8.5 % (ref 5–12)
NEUTROPHILS NFR BLD AUTO: 3.71 10*3/MM3 (ref 1.7–7)
NEUTROPHILS NFR BLD AUTO: 59.9 % (ref 42.7–76)
NITRITE UR QL STRIP: NEGATIVE
NRBC BLD AUTO-RTO: 0 /100 WBC (ref 0–0.2)
PH UR STRIP.AUTO: 5.5 [PH] (ref 5–9)
PLATELET # BLD AUTO: 214 10*3/MM3 (ref 140–450)
PMV BLD AUTO: 9.6 FL (ref 6–12)
POTASSIUM SERPL-SCNC: 4.1 MMOL/L (ref 3.5–5.2)
PROT SERPL-MCNC: 7.6 G/DL (ref 6–8.5)
PROT UR QL STRIP: ABNORMAL
RBC # BLD AUTO: 4.07 10*6/MM3 (ref 4.14–5.8)
RBC # UR: ABNORMAL /HPF
REF LAB TEST METHOD: ABNORMAL
SARS-COV-2 RNA PNL SPEC NAA+PROBE: NOT DETECTED
SODIUM SERPL-SCNC: 137 MMOL/L (ref 136–145)
SP GR UR STRIP: 1.01 (ref 1–1.03)
SQUAMOUS #/AREA URNS HPF: ABNORMAL /HPF
UROBILINOGEN UR QL STRIP: ABNORMAL
WBC # BLD AUTO: 6.2 10*3/MM3 (ref 3.4–10.8)
WBC UR QL AUTO: ABNORMAL /HPF

## 2021-11-15 PROCEDURE — 63710000001 CYCLOSPORINE MODIFIED PER 25 MG: Performed by: NURSE PRACTITIONER

## 2021-11-15 PROCEDURE — 93005 ELECTROCARDIOGRAM TRACING: CPT | Performed by: PHYSICIAN ASSISTANT

## 2021-11-15 PROCEDURE — 70450 CT HEAD/BRAIN W/O DYE: CPT

## 2021-11-15 PROCEDURE — 83735 ASSAY OF MAGNESIUM: CPT | Performed by: PHYSICIAN ASSISTANT

## 2021-11-15 PROCEDURE — 63710000001 INSULIN DETEMIR PER 5 UNITS: Performed by: NURSE PRACTITIONER

## 2021-11-15 PROCEDURE — G0378 HOSPITAL OBSERVATION PER HR: HCPCS

## 2021-11-15 PROCEDURE — 93010 ELECTROCARDIOGRAM REPORT: CPT | Performed by: INTERNAL MEDICINE

## 2021-11-15 PROCEDURE — 99284 EMERGENCY DEPT VISIT MOD MDM: CPT

## 2021-11-15 PROCEDURE — 82962 GLUCOSE BLOOD TEST: CPT

## 2021-11-15 PROCEDURE — 81001 URINALYSIS AUTO W/SCOPE: CPT | Performed by: PHYSICIAN ASSISTANT

## 2021-11-15 PROCEDURE — 96360 HYDRATION IV INFUSION INIT: CPT

## 2021-11-15 PROCEDURE — 80053 COMPREHEN METABOLIC PANEL: CPT | Performed by: PHYSICIAN ASSISTANT

## 2021-11-15 PROCEDURE — 87636 SARSCOV2 & INF A&B AMP PRB: CPT | Performed by: PHYSICIAN ASSISTANT

## 2021-11-15 PROCEDURE — C9803 HOPD COVID-19 SPEC COLLECT: HCPCS

## 2021-11-15 PROCEDURE — 93880 EXTRACRANIAL BILAT STUDY: CPT

## 2021-11-15 PROCEDURE — 85025 COMPLETE CBC W/AUTO DIFF WBC: CPT | Performed by: PHYSICIAN ASSISTANT

## 2021-11-15 PROCEDURE — 63710000001 CYCLOSPORINE MODIFIED PER 100 MG: Performed by: NURSE PRACTITIONER

## 2021-11-15 PROCEDURE — 63710000001 INSULIN ASPART PER 5 UNITS: Performed by: NURSE PRACTITIONER

## 2021-11-15 PROCEDURE — 96361 HYDRATE IV INFUSION ADD-ON: CPT

## 2021-11-15 PROCEDURE — 71045 X-RAY EXAM CHEST 1 VIEW: CPT

## 2021-11-15 RX ORDER — METOPROLOL SUCCINATE 50 MG/1
50 TABLET, EXTENDED RELEASE ORAL DAILY
Status: DISCONTINUED | OUTPATIENT
Start: 2021-11-16 | End: 2021-11-16 | Stop reason: HOSPADM

## 2021-11-15 RX ORDER — DEXTROSE MONOHYDRATE 25 G/50ML
25 INJECTION, SOLUTION INTRAVENOUS
Status: DISCONTINUED | OUTPATIENT
Start: 2021-11-15 | End: 2021-11-16 | Stop reason: HOSPADM

## 2021-11-15 RX ORDER — GABAPENTIN 100 MG/1
200 CAPSULE ORAL 3 TIMES DAILY
Status: DISCONTINUED | OUTPATIENT
Start: 2021-11-15 | End: 2021-11-16 | Stop reason: HOSPADM

## 2021-11-15 RX ORDER — ROSUVASTATIN CALCIUM 5 MG/1
5 TABLET, COATED ORAL DAILY
Status: DISCONTINUED | OUTPATIENT
Start: 2021-11-16 | End: 2021-11-16 | Stop reason: HOSPADM

## 2021-11-15 RX ORDER — ASPIRIN 81 MG/1
81 TABLET, CHEWABLE ORAL DAILY
Status: DISCONTINUED | OUTPATIENT
Start: 2021-11-16 | End: 2021-11-16 | Stop reason: HOSPADM

## 2021-11-15 RX ORDER — ACETAMINOPHEN 325 MG/1
650 TABLET ORAL EVERY 4 HOURS PRN
Status: DISCONTINUED | OUTPATIENT
Start: 2021-11-15 | End: 2021-11-16 | Stop reason: HOSPADM

## 2021-11-15 RX ORDER — AMLODIPINE BESYLATE 10 MG/1
10 TABLET ORAL DAILY
Status: DISCONTINUED | OUTPATIENT
Start: 2021-11-16 | End: 2021-11-16 | Stop reason: HOSPADM

## 2021-11-15 RX ORDER — SODIUM CHLORIDE 0.9 % (FLUSH) 0.9 %
10 SYRINGE (ML) INJECTION AS NEEDED
Status: DISCONTINUED | OUTPATIENT
Start: 2021-11-15 | End: 2021-11-16 | Stop reason: HOSPADM

## 2021-11-15 RX ORDER — CITALOPRAM 20 MG/1
20 TABLET ORAL DAILY
Status: DISCONTINUED | OUTPATIENT
Start: 2021-11-16 | End: 2021-11-16 | Stop reason: HOSPADM

## 2021-11-15 RX ORDER — SODIUM CHLORIDE 0.9 % (FLUSH) 0.9 %
10 SYRINGE (ML) INJECTION EVERY 12 HOURS SCHEDULED
Status: DISCONTINUED | OUTPATIENT
Start: 2021-11-15 | End: 2021-11-16 | Stop reason: HOSPADM

## 2021-11-15 RX ORDER — NICOTINE POLACRILEX 4 MG
15 LOZENGE BUCCAL
Status: DISCONTINUED | OUTPATIENT
Start: 2021-11-15 | End: 2021-11-16 | Stop reason: HOSPADM

## 2021-11-15 RX ORDER — ONDANSETRON 2 MG/ML
4 INJECTION INTRAMUSCULAR; INTRAVENOUS EVERY 6 HOURS PRN
Status: DISCONTINUED | OUTPATIENT
Start: 2021-11-15 | End: 2021-11-16 | Stop reason: HOSPADM

## 2021-11-15 RX ORDER — ONDANSETRON 4 MG/1
4 TABLET, FILM COATED ORAL EVERY 6 HOURS PRN
Status: DISCONTINUED | OUTPATIENT
Start: 2021-11-15 | End: 2021-11-16 | Stop reason: HOSPADM

## 2021-11-15 RX ORDER — DOCUSATE SODIUM 100 MG/1
100 CAPSULE, LIQUID FILLED ORAL DAILY PRN
Status: DISCONTINUED | OUTPATIENT
Start: 2021-11-15 | End: 2021-11-16 | Stop reason: HOSPADM

## 2021-11-15 RX ORDER — SODIUM CHLORIDE, SODIUM LACTATE, POTASSIUM CHLORIDE, CALCIUM CHLORIDE 600; 310; 30; 20 MG/100ML; MG/100ML; MG/100ML; MG/100ML
100 INJECTION, SOLUTION INTRAVENOUS CONTINUOUS
Status: DISCONTINUED | OUTPATIENT
Start: 2021-11-15 | End: 2021-11-16 | Stop reason: HOSPADM

## 2021-11-15 RX ADMIN — INSULIN ASPART 8 UNITS: 100 INJECTION, SOLUTION INTRAVENOUS; SUBCUTANEOUS at 21:02

## 2021-11-15 RX ADMIN — CYCLOSPORINE 125 MG: 100 CAPSULE, LIQUID FILLED ORAL at 21:01

## 2021-11-15 RX ADMIN — INSULIN ASPART 5 UNITS: 100 INJECTION, SOLUTION INTRAVENOUS; SUBCUTANEOUS at 21:01

## 2021-11-15 RX ADMIN — SODIUM CHLORIDE, POTASSIUM CHLORIDE, SODIUM LACTATE AND CALCIUM CHLORIDE 100 ML/HR: 600; 310; 30; 20 INJECTION, SOLUTION INTRAVENOUS at 21:02

## 2021-11-15 RX ADMIN — INSULIN DETEMIR 20 UNITS: 100 INJECTION, SOLUTION SUBCUTANEOUS at 21:01

## 2021-11-15 RX ADMIN — SODIUM CHLORIDE, PRESERVATIVE FREE 10 ML: 5 INJECTION INTRAVENOUS at 21:03

## 2021-11-15 RX ADMIN — SODIUM CHLORIDE 1000 ML: 9 INJECTION, SOLUTION INTRAVENOUS at 15:09

## 2021-11-15 RX ADMIN — GABAPENTIN 200 MG: 100 CAPSULE ORAL at 21:01

## 2021-11-15 NOTE — H&P
[facility] Medicine Admission      Date of Admission: 11/15/2021      Primary Care Physician: Cam Cortez MD      Chief Complaint: Dizziness    HPI:  This is a 76 year old male with a history of HFrEF s/p heart transplant, HTN, and DMII who presents to the ED with a complaint of 3 days of dizziness worse with standing or moving his head.  He was found to be orthostatic in the emergency department.  He reports similar symptoms 3 weeks ago and states he was seen by his cardiologist at [place] and had a work-up including heart cath which were unremarkable.  These records are not able to be seen on Care Everywhere. No other known aggravating or alleviating factors.  No other associated symptoms.    Concurrent Medical History:  has a past medical history of Chronic kidney disease, Diabetes mellitus (HCC), GERD (gastroesophageal reflux disease), HFrEF (heart failure with reduced ejection fraction) ([facility]), and Hypertension.    Past Surgical History:   Past Surgical History:   Procedure Laterality Date   • HEART TRANSPLANT     • HERNIA REPAIR         Family History:   Family History   Problem Relation Age of Onset   • Heart disease Mother    • Heart disease Father    • Heart disease Brother    • Diabetes Brother        Social History:   Social History     Socioeconomic History   • Marital status:    Tobacco Use   • Smoking status: Former Smoker   • Smokeless tobacco: Former User   Substance and Sexual Activity   • Alcohol use: Never   • Drug use: Never       Allergies:   Allergies   Allergen Reactions   • Levofloxacin Anaphylaxis       Medications:   No current facility-administered medications on file prior to encounter.     Current Outpatient Medications on File Prior to Encounter   Medication Sig Dispense Refill   • amLODIPine (NORVASC) 10 MG tablet Take 10 mg by mouth Daily.     • aspirin 81 MG chewable tablet Chew 81 mg Daily.     • bacitracin 500 UNIT/GM  ointment Apply  topically to the appropriate area as directed 2 (Two) Times a Day. 28 g 0   • calcitriol (ROCALTROL) 0.25 MCG capsule Take 0.25 mcg by mouth.     • calcium-vitamin D (OSCAL-500) 500-200 MG-UNIT per tablet Take 1 tablet by mouth Daily.     • cephalexin (KEFLEX) 500 MG capsule Take 1 capsule by mouth 2 (Two) Times a Day. 14 capsule 0   • citalopram (CeleXA) 20 MG tablet Take 20 mg by mouth Daily.     • cycloSPORINE modified (GENGRAF) 100 MG capsule Take 125 mg by mouth 2 (Two) Times a Day.     • docusate sodium (COLACE) 100 MG capsule Take 100 mg by mouth As Needed for Constipation.     • doxazosin (CARDURA) 8 MG tablet Take 8 mg by mouth.     • ezetimibe (ZETIA) 10 MG tablet Take 10 mg by mouth Daily.     • furosemide (LASIX) 40 MG tablet Take 40 mg by mouth.     • gabapentin (NEURONTIN) 100 MG capsule Take 200 mg by mouth 3 (Three) Times a Day.     • hydrALAZINE (APRESOLINE) 10 MG tablet Take 10 mg by mouth 3 (Three) Times a Day.     • insulin aspart (novoLOG) 100 UNIT/ML injection Inject 38 Units under the skin into the appropriate area as directed 3 (Three) Times a Day.     • insulin glargine (LANTUS) 100 UNIT/ML injection Inject 20 Units under the skin into the appropriate area as directed Daily.     • isosorbide dinitrate (ISORDIL) 20 MG tablet Take 20 mg by mouth 3 (Three) Times a Day.     • metoprolol succinate XL (TOPROL-XL) 50 MG 24 hr tablet Take 50 mg by mouth Daily.     • Omega-3 Fatty Acids (FISH OIL) 1000 MG capsule capsule Take 1 capsule by mouth.     • potassium chloride (KLOR-CON) 20 MEQ packet Take 60 mEq by mouth.     • rosuvastatin (CRESTOR) 5 MG tablet Take 5 mg by mouth Daily.     • Sennosides 15 MG chewable tablet Chew 2 tablets As Needed.     • sulfamethoxazole-trimethoprim (BACTRIM DS,SEPTRA DS) 800-160 MG per tablet Take 1 tablet by mouth 1 (One) Time Per Week.           Review of Systems:  Review of Systems   Constitutional: Negative for appetite change, chills, fatigue and  fever.   HENT: Negative for congestion.    Eyes: Negative for visual disturbance.   Respiratory: Negative for cough, chest tightness, shortness of breath and wheezing.    Cardiovascular: Negative for chest pain, palpitations and leg swelling.   Gastrointestinal: Negative for abdominal distention, abdominal pain, diarrhea, nausea and vomiting.   Genitourinary: Negative for difficulty urinating and dysuria.   Musculoskeletal: Negative for arthralgias, back pain, myalgias and neck pain.   Skin: Negative for rash and wound.   Neurological: Positive for dizziness. Negative for syncope, weakness, numbness and headaches.   All other systems reviewed and are negative.     Otherwise complete ROS is negative except as mentioned above.    Physical Exam:   Temp:  [98.4 °F (36.9 °C)] 98.4 °F (36.9 °C)  Heart Rate:  [78-94] 78  Resp:  [16] 16  BP: (100-159)/(59-84) 155/79  Physical Exam  Vitals reviewed.   Constitutional:       General: He is not in acute distress.     Appearance: He is well-developed. He is not diaphoretic.   HENT:      Head: Normocephalic and atraumatic.   Eyes:      Conjunctiva/sclera: Conjunctivae normal.   Cardiovascular:      Rate and Rhythm: Normal rate and regular rhythm.      Heart sounds: No murmur heard.  No friction rub. No gallop.    Pulmonary:      Effort: Pulmonary effort is normal. No respiratory distress.      Breath sounds: Normal breath sounds. No wheezing or rales.   Chest:      Chest wall: No tenderness.   Abdominal:      General: Bowel sounds are normal. There is no distension.      Palpations: Abdomen is soft.      Tenderness: There is no abdominal tenderness.   Musculoskeletal:         General: No swelling or deformity.      Cervical back: Normal range of motion and neck supple.   Skin:     General: Skin is warm and dry.      Findings: No erythema.   Neurological:      General: No focal deficit present.      Mental Status: He is alert and oriented to person, place, and time.            Results Reviewed:  I have personally reviewed current lab, radiology, and data and agree with results.  Lab Results (last 24 hours)     Procedure Component Value Units Date/Time    COVID-19 and FLU A/B PCR - Swab, Nasopharynx [[id number]]  (Normal) Collected: 11/15/21 1601    Specimen: Swab from Nasopharynx Updated: 11/15/21 1640     COVID19 Not Detected     Influenza A PCR Not Detected     Influenza B PCR Not Detected    Narrative:      Fact sheet for providers: https://www.fda.gov/media/687877/download    Fact sheet for patients: https://www.fda.gov/media/054105/download    Test performed by PCR.    Extra Tubes [[id number]] Collected: 11/15/21 1410    Specimen: Blood, Venous Line Updated: 11/15/21 1515    Narrative:      The following orders were created for panel order Extra Tubes.  Procedure                               Abnormality         Status                     ---------                               -----------         ------                     Gold Top - SST[[id number]]                                   Final result                 Please view results for these tests on the individual orders.    Gold Top - SST [[id number]] Collected: 11/15/21 1410    Specimen: Blood Updated: 11/15/21 1515     Extra Tube Hold for add-ons.     Comment: Auto resulted.       Urinalysis With Culture If Indicated - Urine, Clean Catch [[id number]]  (Abnormal) Collected: 11/15/21 1456    Specimen: Urine, Clean Catch Updated: 11/15/21 1507     Color, UA Yellow     Appearance, UA Clear     pH, UA 5.5     Specific Gravity, UA 1.015     Glucose,  mg/dL (2+)     Ketones, UA Negative     Bilirubin, UA Negative     Blood, UA Negative     Protein,  mg/dL (2+)     Leuk Esterase, UA Negative     Nitrite, UA Negative     Urobilinogen, UA 0.2 E.U./dL    Urinalysis, Microscopic Only - Urine, Clean Catch [[id number]]  (Abnormal) Collected: 11/15/21 1456    Specimen: Urine, Clean Catch Updated: 11/15/21 1507     RBC, UA 0-2  /HPF      WBC, UA None Seen /HPF      Bacteria, UA None Seen /HPF      Squamous Epithelial Cells, UA None Seen /HPF      Hyaline Casts, UA 3-6 /LPF      Methodology Automated Microscopy    Comprehensive Metabolic Panel [[id number]]  (Abnormal) Collected: 11/15/21 1359    Specimen: Blood Updated: 11/15/21 1433     Glucose 301 mg/dL      BUN 38 mg/dL      Creatinine 2.31 mg/dL      Sodium 137 mmol/L      Potassium 4.1 mmol/L      Chloride 100 mmol/L      CO2 25.0 mmol/L      Calcium 8.7 mg/dL      Total Protein 7.6 g/dL      Albumin 4.20 g/dL      ALT (SGPT) 12 U/L      AST (SGOT) 19 U/L      Alkaline Phosphatase 94 U/L      Total Bilirubin 0.2 mg/dL      eGFR Non African Amer 28 mL/min/1.73      Globulin 3.4 gm/dL      A/G Ratio 1.2 g/dL      BUN/Creatinine Ratio 16.5     Anion Gap 12.0 mmol/L     Narrative:      GFR Normal >60  Chronic Kidney Disease <60  Kidney Failure <15      Magnesium [[id number]]  (Normal) Collected: 11/15/21 1359    Specimen: Blood Updated: 11/15/21 1433     Magnesium 1.8 mg/dL     CBC & Differential [[id number]]  (Abnormal) Collected: 11/15/21 1359    Specimen: Blood Updated: 11/15/21 1412    Narrative:      The following orders were created for panel order CBC & Differential.  Procedure                               Abnormality         Status                     ---------                               -----------         ------                     CBC Auto Differential[[id number]]        Abnormal            Final result                 Please view results for these tests on the individual orders.    CBC Auto Differential [[id number]]  (Abnormal) Collected: 11/15/21 1359    Specimen: Blood Updated: 11/15/21 1412     WBC 6.20 10*3/mm3      RBC 4.07 10*6/mm3      Hemoglobin 11.0 g/dL      Hematocrit 32.9 %      MCV 80.8 fL      MCH 27.0 pg      MCHC 33.4 g/dL      RDW 12.8 %      RDW-SD 37.2 fl      MPV 9.6 fL      Platelets 214 10*3/mm3      Neutrophil % 59.9 %      Lymphocyte % 28.9 %       Monocyte % 8.5 %      Eosinophil % 1.6 %      Basophil % 0.6 %      Immature Grans % 0.5 %      Neutrophils, Absolute 3.71 10*3/mm3      Lymphocytes, Absolute 1.79 10*3/mm3      Monocytes, Absolute 0.53 10*3/mm3      Eosinophils, Absolute 0.10 10*3/mm3      Basophils, Absolute 0.04 10*3/mm3      Immature Grans, Absolute 0.03 10*3/mm3      nRBC 0.0 /100 WBC         Imaging Results (Last 24 Hours)     Procedure Component Value Units Date/Time    CT Head Without Contrast [[id number]] Collected: 11/15/21 1427     Updated: 11/15/21 1450    Narrative:      CT head without IV contrast November 15, 2021    INDICATION: Lower extremity weakness with dizziness    TECHNIQUE: Spiral images obtained from foramen magnum through  vertex without IV contrast. Sagittal, axial and coronal  reformatted images generated retrospectively.    FINDINGS:  No mass effect, midline shift, hemorrhage, hydrocephalus or  extra-axial fluid collections.  Atrophy.  Mild diffuse periventricular low-attenuation. Appearance  consistent with chronic small vessel white matter ischemic  change.  No significant focal or acute parenchymal pathology appreciated.  No significant widening of the CP angle cistern on either side.  Visualized mastoids and sinuses grossly clear.  No focal or acute bony abnormality.      Impression:      Atrophy with evidence of chronic small vessel white matter  ischemic change. No significant focal or acute intracranial  pathology.    Electronically signed by:  Aleksandr Douglas MD  11/15/2021 2:49 PM  CST Workstation: [id number]    XR Chest 1 View [[id number]] Collected: 11/15/21 1404     Updated: 11/15/21 1429    Narrative:      EXAM: XR CHEST 1 VIEW    COMPARISONS: Radiograph 3/9/2020    INDICATION: dizziness    FINDINGS:  Frontal view of the chest.    Lungs are symmetric and adequately expanded. No focal  consolidation, effusion, or pneumothorax. Heart size is normal.  No acute osseous abnormalities. Sternotomy wires are noted.       Impression:      No acute cardiopulmonary process.    Electronically signed by:  Amy Deng MD  11/15/2021  2:28 PM CST Workstation: [id number]            Assessment:      Orthostatic dizziness    Near syncope    DMII    HTN    S/p heart transplant          Plan:  1. Observation, telemetry  2. IV fluid:  ml/hr  3. Hold diuretics  4. Orthostatics q shift  5. Carotid ultrasound  6. Glucose control: Levemir, Prandial and SSI  7. BP control: Norvasc, Toprol-XL  8. Continue cyclosporine  9. VTE PPx: SCD  10. FULL CODE      I confirmed that the patient's Advance Care Plan is present, code status is documented, or surrogate decision maker is listed in the patient's medical record.     I have utilized all available immediate resources to obtain, update, or review the patient's current medications.     I discussed the patient's findings and my recommendations with: patient    Kit Quevedo Dex, APRN  11/15/21  17:29 CST

## 2021-11-15 NOTE — ED PROVIDER NOTES
Subjective   Patient presents to emergency department for dizziness, bilateral lower extremity weakness x 3 days.  He describes dizziness as light headed feeling/like he is going to pass out.  Denies syncope but states when he gets up in the middle of the night multiple times to use the restroom he sits on his bed and uncontrollably falls back without loss of consciousness.  States he had the same symptoms under a month ago and was admitted to [place] where he underwent extensive testing including a cardiac catheterization.  No cause was found and he felt well the 3 weeks after that admission.  States his cardiologist told him it was probably a pinches nerve.  Hx of heart transplant 2002 at [facility].        History provided by:  Patient   used: No        Review of Systems   Constitutional: Negative for chills and fever.   HENT: Negative for sore throat and trouble swallowing.    Respiratory: Negative for cough, shortness of breath and wheezing.    Cardiovascular: Negative for chest pain.   Gastrointestinal: Negative for abdominal pain, nausea and vomiting.   Genitourinary: Negative for dysuria and flank pain.   Skin: Negative for color change.   Allergic/Immunologic: Negative for immunocompromised state.   Neurological: Positive for dizziness, weakness (BLE) and headaches. Negative for speech difficulty, light-headedness and numbness.   Hematological: Does not bruise/bleed easily.   Psychiatric/Behavioral: Negative for confusion.       Past Medical History:   Diagnosis Date   • Diabetes mellitus (HCC)        Allergies   Allergen Reactions   • Levofloxacin Anaphylaxis       Past Surgical History:   Procedure Laterality Date   • HEART TRANSPLANT         History reviewed. No pertinent family history.    Social History     Socioeconomic History   • Marital status:    Tobacco Use   • Smoking status: Former Smoker   • Smokeless tobacco: Former User   Substance and Sexual Activity   • Alcohol use:  "Never   • Drug use: Never           Objective      /74   Pulse 78   Temp 98.4 °F (36.9 °C) (Infrared)   Resp 16   Ht 170.2 cm (67\")   Wt 87.1 kg (192 lb)   SpO2 97%   BMI 30.07 kg/m²     Physical Exam  Vitals and nursing note reviewed.   Constitutional:       General: He is not in acute distress.     Appearance: Normal appearance. He is not ill-appearing.   HENT:      Head: Normocephalic and atraumatic.      Mouth/Throat:      Pharynx: Oropharynx is clear.   Eyes:      Extraocular Movements: Extraocular movements intact.      Pupils: Pupils are equal, round, and reactive to light.   Cardiovascular:      Rate and Rhythm: Normal rate and regular rhythm.      Pulses: Normal pulses.      Heart sounds: Normal heart sounds.   Pulmonary:      Effort: Pulmonary effort is normal.      Breath sounds: Normal breath sounds.   Musculoskeletal:      Right lower leg: No edema.      Left lower leg: No edema.   Skin:     General: Skin is warm.      Capillary Refill: Capillary refill takes less than 2 seconds.   Neurological:      Mental Status: He is alert and oriented to person, place, and time. Mental status is at baseline.   Psychiatric:         Mood and Affect: Mood normal.         Behavior: Behavior normal.         Thought Content: Thought content normal.         ECG 12 Lead      Date/Time: 11/15/2021 2:22 PM  Performed by: Jayce Almeida PA-C  Authorized by: Jayce Almeida PA-C   Interpreted by physician  Comparison: not compared with previous ECG   Previous ECG: no previous ECG available  Rhythm: sinus rhythm  Rate: normal  BPM: 87  ST Segments: ST segments normal  Other findings: prolonged QTc interval  Clinical impression: abnormal ECG                 ED Course      Results for orders placed or performed during the hospital encounter of 11/15/21   Comprehensive Metabolic Panel    Specimen: Blood   Result Value Ref Range    Glucose 301 (H) 65 - 99 mg/dL    BUN 38 (H) 8 - 23 mg/dL    Creatinine 2.31 " (H) 0.76 - 1.27 mg/dL    Sodium 137 136 - 145 mmol/L    Potassium 4.1 3.5 - 5.2 mmol/L    Chloride 100 98 - 107 mmol/L    CO2 25.0 22.0 - 29.0 mmol/L    Calcium 8.7 8.6 - 10.5 mg/dL    Total Protein 7.6 6.0 - 8.5 g/dL    Albumin 4.20 3.50 - 5.20 g/dL    ALT (SGPT) 12 1 - 41 U/L    AST (SGOT) 19 1 - 40 U/L    Alkaline Phosphatase 94 39 - 117 U/L    Total Bilirubin 0.2 0.0 - 1.2 mg/dL    eGFR Non African Amer 28 (L) >60 mL/min/1.73    Globulin 3.4 gm/dL    A/G Ratio 1.2 g/dL    BUN/Creatinine Ratio 16.5 7.0 - 25.0    Anion Gap 12.0 5.0 - 15.0 mmol/L   Magnesium    Specimen: Blood   Result Value Ref Range    Magnesium 1.8 1.6 - 2.4 mg/dL   Urinalysis With Culture If Indicated - Urine, Clean Catch    Specimen: Urine, Clean Catch   Result Value Ref Range    Color, UA Yellow Yellow, Straw, Dark Yellow, [place]    Appearance, UA Clear Clear    pH, UA 5.5 5.0 - 9.0    Specific [place], UA 1.015 1.003 - 1.030    Glucose,  mg/dL (2+) (A) Negative    Ketones, UA Negative Negative    Bilirubin, UA Negative Negative    Blood, UA Negative Negative    Protein,  mg/dL (2+) (A) Negative    Leuk Esterase, UA Negative Negative    Nitrite, UA Negative Negative    Urobilinogen, UA 0.2 E.U./dL 0.2 - 1.0 E.U./dL   CBC Auto Differential    Specimen: Blood   Result Value Ref Range    WBC 6.20 3.40 - 10.80 10*3/mm3    RBC 4.07 (L) 4.14 - 5.80 10*6/mm3    Hemoglobin 11.0 (L) 13.0 - 17.7 g/dL    Hematocrit 32.9 (L) 37.5 - 51.0 %    MCV 80.8 79.0 - 97.0 fL    MCH 27.0 26.6 - 33.0 pg    MCHC 33.4 31.5 - 35.7 g/dL    RDW 12.8 12.3 - 15.4 %    RDW-SD 37.2 37.0 - 54.0 fl    MPV 9.6 6.0 - 12.0 fL    Platelets 214 140 - 450 10*3/mm3    Neutrophil % 59.9 42.7 - 76.0 %    Lymphocyte % 28.9 19.6 - 45.3 %    Monocyte % 8.5 5.0 - 12.0 %    Eosinophil % 1.6 0.3 - 6.2 %    Basophil % 0.6 0.0 - 1.5 %    Immature Grans % 0.5 0.0 - 0.5 %    Neutrophils, Absolute 3.71 1.70 - 7.00 10*3/mm3    Lymphocytes, Absolute 1.79 0.70 - 3.10 10*3/mm3     Monocytes, Absolute 0.53 0.10 - 0.90 10*3/mm3    Eosinophils, Absolute 0.10 0.00 - 0.40 10*3/mm3    Basophils, Absolute 0.04 0.00 - 0.20 10*3/mm3    Immature Grans, Absolute 0.03 0.00 - 0.05 10*3/mm3    nRBC 0.0 0.0 - 0.2 /100 WBC   Urinalysis, Microscopic Only - Urine, Clean Catch    Specimen: Urine, Clean Catch   Result Value Ref Range    RBC, UA 0-2 (A) None Seen /HPF    WBC, UA None Seen None Seen, 0-2, 3-5 /HPF    Bacteria, UA None Seen None Seen /HPF    Squamous Epithelial Cells, UA None Seen None Seen, 0-2 /HPF    Hyaline Casts, UA 3-6 None Seen /LPF    Methodology Automated Microscopy    Gold Top - SST   Result Value Ref Range    Extra Tube Hold for add-ons.      CT Head Without Contrast    Result Date: 11/15/2021  Narrative: CT head without IV contrast November 15, 2021 INDICATION: Lower extremity weakness with dizziness TECHNIQUE: Spiral images obtained from foramen magnum through vertex without IV contrast. Sagittal, axial and coronal reformatted images generated retrospectively. FINDINGS: No mass effect, midline shift, hemorrhage, hydrocephalus or extra-axial fluid collections. Atrophy. Mild diffuse periventricular low-attenuation. Appearance consistent with chronic small vessel white matter ischemic change. No significant focal or acute parenchymal pathology appreciated. No significant widening of the CP angle cistern on either side. Visualized mastoids and sinuses grossly clear. No focal or acute bony abnormality.     Impression: Atrophy with evidence of chronic small vessel white matter ischemic change. No significant focal or acute intracranial pathology. Electronically signed by:  Aleksandr Douglas MD  11/15/2021 2:49 PM CST Workstation: [id number]    XR Chest 1 View    Result Date: 11/15/2021  Narrative: EXAM: XR CHEST 1 VIEW COMPARISONS: Radiograph 3/9/2020 INDICATION: dizziness FINDINGS: Frontal view of the chest. Lungs are symmetric and adequately expanded. No focal consolidation, effusion, or  pneumothorax. Heart size is normal. No acute osseous abnormalities. Sternotomy wires are noted.     Impression: No acute cardiopulmonary process. Electronically signed by:  Amy Deng MD  11/15/2021 2:28 PM [facility] Workstation: [id number]                                         [facility]    Final diagnoses:   Near syncope   Orthostatic dizziness   Orthostatic headache       ED Disposition  ED Disposition     ED Disposition Condition Comment    Decision to Admit  Level of Care: Telemetry [5]   Diagnosis: Near syncope [864547]   Admitting Physician: NUZHAT JIMENEZ [[id number]]   Attending Physician: NUZHAT JIMENEZ [[id number]]            No follow-up provider specified.       Medication List      No changes were made to your prescriptions during this visit.          Jayce Almeida PA-C  11/15/21 1543

## 2021-11-16 VITALS
BODY MASS INDEX: 30.39 KG/M2 | SYSTOLIC BLOOD PRESSURE: 166 MMHG | DIASTOLIC BLOOD PRESSURE: 70 MMHG | OXYGEN SATURATION: 96 % | HEIGHT: 67 IN | TEMPERATURE: 96.6 F | HEART RATE: 78 BPM | WEIGHT: 193.6 LBS | RESPIRATION RATE: 20 BRPM

## 2021-11-16 LAB
ANION GAP SERPL CALCULATED.3IONS-SCNC: 10 MMOL/L (ref 5–15)
BUN SERPL-MCNC: 32 MG/DL (ref 8–23)
BUN/CREAT SERPL: 17.1 (ref 7–25)
CALCIUM SPEC-SCNC: 8.4 MG/DL (ref 8.6–10.5)
CHLORIDE SERPL-SCNC: 104 MMOL/L (ref 98–107)
CO2 SERPL-SCNC: 25 MMOL/L (ref 22–29)
CREAT SERPL-MCNC: 1.87 MG/DL (ref 0.76–1.27)
GFR SERPL CREATININE-BSD FRML MDRD: 35 ML/MIN/1.73
GLUCOSE BLDC GLUCOMTR-MCNC: 119 MG/DL (ref 70–130)
GLUCOSE BLDC GLUCOMTR-MCNC: 281 MG/DL (ref 70–130)
GLUCOSE BLDC GLUCOMTR-MCNC: 289 MG/DL (ref 70–130)
GLUCOSE BLDC GLUCOMTR-MCNC: 67 MG/DL (ref 70–130)
GLUCOSE SERPL-MCNC: 68 MG/DL (ref 65–99)
POTASSIUM SERPL-SCNC: 3.4 MMOL/L (ref 3.5–5.2)
SODIUM SERPL-SCNC: 139 MMOL/L (ref 136–145)
WHOLE BLOOD HOLD SPECIMEN: NORMAL

## 2021-11-16 PROCEDURE — 63710000001 INSULIN ASPART PER 5 UNITS: Performed by: NURSE PRACTITIONER

## 2021-11-16 PROCEDURE — 80048 BASIC METABOLIC PNL TOTAL CA: CPT | Performed by: NURSE PRACTITIONER

## 2021-11-16 PROCEDURE — 63710000001 CYCLOSPORINE MODIFIED PER 25 MG: Performed by: NURSE PRACTITIONER

## 2021-11-16 PROCEDURE — G0378 HOSPITAL OBSERVATION PER HR: HCPCS

## 2021-11-16 PROCEDURE — 96361 HYDRATE IV INFUSION ADD-ON: CPT

## 2021-11-16 PROCEDURE — 63710000001 CYCLOSPORINE MODIFIED PER 100 MG: Performed by: NURSE PRACTITIONER

## 2021-11-16 PROCEDURE — 82962 GLUCOSE BLOOD TEST: CPT

## 2021-11-16 RX ORDER — POTASSIUM CHLORIDE 750 MG/1
40 CAPSULE, EXTENDED RELEASE ORAL ONCE
Status: COMPLETED | OUTPATIENT
Start: 2021-11-16 | End: 2021-11-16

## 2021-11-16 RX ADMIN — INSULIN ASPART 5 UNITS: 100 INJECTION, SOLUTION INTRAVENOUS; SUBCUTANEOUS at 08:04

## 2021-11-16 RX ADMIN — POTASSIUM CHLORIDE 40 MEQ: 750 CAPSULE, EXTENDED RELEASE ORAL at 10:38

## 2021-11-16 RX ADMIN — METOPROLOL SUCCINATE 50 MG: 50 TABLET, EXTENDED RELEASE ORAL at 07:55

## 2021-11-16 RX ADMIN — CYCLOSPORINE 125 MG: 100 CAPSULE, LIQUID FILLED ORAL at 07:57

## 2021-11-16 RX ADMIN — SODIUM CHLORIDE, PRESERVATIVE FREE 10 ML: 5 INJECTION INTRAVENOUS at 07:58

## 2021-11-16 RX ADMIN — SODIUM CHLORIDE, POTASSIUM CHLORIDE, SODIUM LACTATE AND CALCIUM CHLORIDE 100 ML/HR: 600; 310; 30; 20 INJECTION, SOLUTION INTRAVENOUS at 08:02

## 2021-11-16 RX ADMIN — ROSUVASTATIN CALCIUM 5 MG: 5 TABLET, FILM COATED ORAL at 07:55

## 2021-11-16 RX ADMIN — CITALOPRAM HYDROBROMIDE 20 MG: 20 TABLET ORAL at 07:57

## 2021-11-16 RX ADMIN — AMLODIPINE BESYLATE 10 MG: 10 TABLET ORAL at 07:56

## 2021-11-16 RX ADMIN — GABAPENTIN 200 MG: 100 CAPSULE ORAL at 07:54

## 2021-11-16 RX ADMIN — ASPIRIN 81 MG: 81 TABLET, CHEWABLE ORAL at 07:56

## 2021-11-16 NOTE — DISCHARGE SUMMARY
[facility] Medicine Services  DISCHARGE SUMMARY       Date of Admission: 11/15/2021  Date of Discharge:  11/16/2021  Primary Care Physician: Cam Cortez MD    Presenting Problem/History of Present Illness:  Orthostatic headache [R51.0]  Near syncope [R55]  Orthostatic dizziness [R42]     Final Discharge Diagnoses:    Orthostatic dizziness    Near syncope      Consults:   Consults     No orders found for last 30 day(s).          Pertinent Test Results:   CT Head Without Contrast    Result Date: 11/15/2021  CT head without IV contrast November 15, 2021 INDICATION: Lower extremity weakness with dizziness TECHNIQUE: Spiral images obtained from foramen magnum through vertex without IV contrast. Sagittal, axial and coronal reformatted images generated retrospectively. FINDINGS: No mass effect, midline shift, hemorrhage, hydrocephalus or extra-axial fluid collections. Atrophy. Mild diffuse periventricular low-attenuation. Appearance consistent with chronic small vessel white matter ischemic change. No significant focal or acute parenchymal pathology appreciated. No significant widening of the CP angle cistern on either side. Visualized mastoids and sinuses grossly clear. No focal or acute bony abnormality.     Atrophy with evidence of chronic small vessel white matter ischemic change. No significant focal or acute intracranial pathology. Electronically signed by:  Aleksandr Douglas MD  11/15/2021 2:49 PM CST Workstation: [id number]    XR Chest 1 View    Result Date: 11/15/2021  EXAM: XR CHEST 1 VIEW COMPARISONS: Radiograph 3/9/2020 INDICATION: dizziness FINDINGS: Frontal view of the chest. Lungs are symmetric and adequately expanded. No focal consolidation, effusion, or pneumothorax. Heart size is normal. No acute osseous abnormalities. Sternotomy wires are noted.     No acute cardiopulmonary process. Electronically signed by:  Amy Deng MD  11/15/2021 2:28 PM CST  Workstation: [id number]    US Carotid Bilateral    Result Date: 11/15/2021  PROCEDURE: Bilateral carotid artery Doppler COMPARISON: No comparison HISTORY: Syncope FINDINGS: Realtime grayscale and color flow images as well as spectral waveform analysis is performed of the carotid arteries. There is moderate to significant atherosclerotic disease in the right bulb and proximal internal carotid artery. There is moderate to significant atherosclerotic disease in the left bulb and proximal internal carotid artery. Right: The peak systolic velocity in the right common carotid artery is 70.3 cm/sec. The peak systolic velocity in the right internal carotid artery is 77.9 cm/sec. The IC/CC ratio is 1.1. Antegrade flow is present in the right vertebral artery. Left: The peak systolic velocity in the left common carotid artery is 62.0 cm/sec. The peak systolic velocity in the left internal carotid artery is 81.4 cm/sec. The IC/CC ratio is 1.3. Antegrade flow is present in the left vertebral artery.     1. Moderate to significant atherosclerotic disease in the region of the bilateral bulb/internal carotid arteries. No significant flow-limiting stenosis by velocity measurements. Criteria For Estimating Internal Carotid Stenosis ICA-PSV: Normal------------------<125 cm/sec <50%--------------------<125 cm/sec 50 - 69%---------------125-230 cm/sec >70%-------------------->230 cm/sec Near/Occlusion-------Variable Total/Occlusion-------No Flow ICA/CCA PSV Ratio: Normal------------------<2.0 <50%--------------------<2.0 50 - 69%----------------2.0 - 4.0 >70%-------------------->4.0 Near/Occlusion--------Variable Total/Occlusion--------No Flow ICA - EDV: Normal-------------------<40 cm/sec <50%---------------------<40 cm/sec 50 - 69%-----------------40 - 100 cm/sec >70%--------------------->100 cm/sec Near/Occlusion---------Variable Total/Occlusion---------No Flow Nacho EG, Arsen CB, Emelina GL, et al: Society of Radiologists in  "Ultrasound Consensus Conference on Ultrasound and Doppler Diagnosis of Carotid Stenosis. Radiology, Nov 2003. Electronically signed by:  Dax Thurston MD  11/15/2021 7:44 PM CST Workstation: [id number]      HPI/Hospital Course:  The patient is a 76 y.o. male with a history of HFrEF s/p heart transplant, HTN, and DMII who presented to [facility] with 3 days of dizziness worse with standing or moving his head.  He was found to be orthostatic in the emergency department. He was managed with fluid and holding diuretics.  Symptoms resolved.  Carotid ultrasound noted atherosclerotic disease without significant flow-limiting stenosis.  He was discharged to home in stable condition.       Condition on Discharge:  Stable    Physical Exam on Discharge:  /70 (BP Location: Right arm, Patient Position: Lying)   Pulse 78   Temp 96.6 °F (35.9 °C) (Oral)   Resp 20   Ht 170.2 cm (67\")   Wt 87.8 kg (193 lb 9.6 oz)   SpO2 96%   BMI 30.32 kg/m²   Physical Exam  Vitals reviewed.   Constitutional:       General: He is not in acute distress.     Appearance: Normal appearance.   HENT:      Head: Normocephalic and atraumatic.   Cardiovascular:      Rate and Rhythm: Normal rate and regular rhythm.   Pulmonary:      Effort: Pulmonary effort is normal. No respiratory distress.      Breath sounds: Normal breath sounds.   Abdominal:      General: There is no distension.      Palpations: Abdomen is soft.      Tenderness: There is no abdominal tenderness.   Musculoskeletal:         General: No swelling or deformity.   Skin:     General: Skin is warm and dry.   Neurological:      General: No focal deficit present.      Mental Status: He is alert and oriented to person, place, and time.         Discharge Disposition:  Home or Self Care    Discharge Medications:     Discharge Medications      Continue These Medications      Instructions Start Date   amLODIPine 10 MG tablet  Commonly known as: NORVASC   10 mg, Oral, Daily    "   aspirin 81 MG chewable tablet   81 mg, Oral, Daily      bacitracin 500 UNIT/GM ointment   Topical, 2 Times Daily      calcitriol 0.25 MCG capsule  Commonly known as: ROCALTROL   0.25 mcg, Oral      calcium-vitamin D 500-200 MG-UNIT per tablet  Commonly known as: OSCAL-500   1 tablet, Oral, Daily      citalopram 20 MG tablet  Commonly known as: CeleXA   20 mg, Oral, Daily      docusate sodium 100 MG capsule  Commonly known as: COLACE   100 mg, Oral, As Needed      doxazosin 8 MG tablet  Commonly known as: CARDURA   8 mg, Oral      ezetimibe 10 MG tablet  Commonly known as: ZETIA   10 mg, Oral, Daily      fish oil 1000 MG capsule capsule   1 capsule, Oral      furosemide 40 MG tablet  Commonly known as: LASIX   40 mg, Oral      gabapentin 100 MG capsule  Commonly known as: NEURONTIN   200 mg, Oral, 3 Times Daily      Gengraf 100 MG capsule  Generic drug: cycloSPORINE modified   125 mg, Oral, 2 Times Daily      hydrALAZINE 10 MG tablet  Commonly known as: APRESOLINE   10 mg, Oral, 3 Times Daily      insulin aspart 100 UNIT/ML injection  Commonly known as: novoLOG   38 Units, Subcutaneous, 3 Times Daily      insulin glargine 100 UNIT/ML injection  Commonly known as: LANTUS, SEMGLEE   20 Units, Subcutaneous, Daily      isosorbide dinitrate 20 MG tablet  Commonly known as: ISORDIL   20 mg, Oral, 3 Times Daily      metoprolol succinate XL 50 MG 24 hr tablet  Commonly known as: TOPROL-XL   50 mg, Oral, Daily      potassium chloride 20 MEQ packet  Commonly known as: KLOR-CON   60 mEq, Oral      rosuvastatin 5 MG tablet  Commonly known as: CRESTOR   5 mg, Oral, Daily      Sennosides 15 MG chewable tablet   2 tablets, Oral, As Needed      sulfamethoxazole-trimethoprim 800-160 MG per tablet  Commonly known as: BACTRIM DS,SEPTRA DS   1 tablet, Oral, Weekly         Stop These Medications    cephalexin 500 MG capsule  Commonly known as: KEFLEX            Discharge Diet:   Diet Instructions     Diet: Regular, Cardiac; Thin       Discharge Diet:  Regular  Cardiac       Fluid Consistency: Thin          Activity at Discharge:   Activity Instructions     Activity as Tolerated            Follow-up Appointments:   1. PCP 1 week      Kit Kowalski, ADOLPH  11/16/21  13:00 CST

## 2021-12-13 LAB
QT INTERVAL: 404 MS
QTC INTERVAL: 486 MS

## 2021-12-16 ENCOUNTER — HOSPITAL ENCOUNTER (OUTPATIENT)
Facility: HOSPITAL | Age: 76
Setting detail: OBSERVATION
Discharge: HOME | End: 2021-12-17
Attending: EMERGENCY MEDICINE | Admitting: INTERNAL MEDICINE

## 2021-12-16 ENCOUNTER — APPOINTMENT (OUTPATIENT)
Dept: GENERAL RADIOLOGY | Facility: HOSPITAL | Age: 76
End: 2021-12-16

## 2021-12-16 DIAGNOSIS — E86.0 DEHYDRATION: ICD-10-CM

## 2021-12-16 DIAGNOSIS — I95.1 ORTHOSTASIS: ICD-10-CM

## 2021-12-16 DIAGNOSIS — N17.9 AKI (ACUTE KIDNEY INJURY) (HCC): Primary | ICD-10-CM

## 2021-12-16 LAB
ALBUMIN SERPL-MCNC: 3.3 G/DL (ref 3.5–5.2)
ALBUMIN/GLOB SERPL: 0.8 G/DL
ALP SERPL-CCNC: 92 U/L (ref 39–117)
ALT SERPL W P-5'-P-CCNC: 9 U/L (ref 1–41)
ANION GAP SERPL CALCULATED.3IONS-SCNC: 15 MMOL/L (ref 5–15)
AST SERPL-CCNC: 22 U/L (ref 1–40)
BACTERIA UR QL AUTO: ABNORMAL /HPF
BASOPHILS # BLD AUTO: 0.03 10*3/MM3 (ref 0–0.2)
BASOPHILS NFR BLD AUTO: 0.5 % (ref 0–1.5)
BILIRUB SERPL-MCNC: 0.3 MG/DL (ref 0–1.2)
BILIRUB UR QL STRIP: NEGATIVE
BUN SERPL-MCNC: 43 MG/DL (ref 8–23)
BUN/CREAT SERPL: 16.8 (ref 7–25)
CALCIUM SPEC-SCNC: 8.7 MG/DL (ref 8.6–10.5)
CHLORIDE SERPL-SCNC: 95 MMOL/L (ref 98–107)
CLARITY UR: CLEAR
CO2 SERPL-SCNC: 21 MMOL/L (ref 22–29)
COLOR UR: YELLOW
CREAT SERPL-MCNC: 2.56 MG/DL (ref 0.76–1.27)
DEPRECATED RDW RBC AUTO: 37.4 FL (ref 37–54)
EOSINOPHIL # BLD AUTO: 0.2 10*3/MM3 (ref 0–0.4)
EOSINOPHIL NFR BLD AUTO: 3 % (ref 0.3–6.2)
ERYTHROCYTE [DISTWIDTH] IN BLOOD BY AUTOMATED COUNT: 13.2 % (ref 12.3–15.4)
FLUAV RNA RESP QL NAA+PROBE: NOT DETECTED
FLUBV RNA RESP QL NAA+PROBE: NOT DETECTED
GFR SERPL CREATININE-BSD FRML MDRD: 24 ML/MIN/1.73
GLOBULIN UR ELPH-MCNC: 4.3 GM/DL
GLUCOSE BLDC GLUCOMTR-MCNC: 318 MG/DL (ref 70–130)
GLUCOSE BLDC GLUCOMTR-MCNC: 363 MG/DL (ref 70–130)
GLUCOSE SERPL-MCNC: 449 MG/DL (ref 65–99)
GLUCOSE UR STRIP-MCNC: ABNORMAL MG/DL
GRAN CASTS URNS QL MICRO: ABNORMAL /LPF
HCT VFR BLD AUTO: 29.7 % (ref 37.5–51)
HGB BLD-MCNC: 10.1 G/DL (ref 13–17.7)
HGB UR QL STRIP.AUTO: ABNORMAL
HOLD SPECIMEN: NORMAL
HOLD SPECIMEN: NORMAL
HYALINE CASTS UR QL AUTO: ABNORMAL /LPF
IMM GRANULOCYTES # BLD AUTO: 0.02 10*3/MM3 (ref 0–0.05)
IMM GRANULOCYTES NFR BLD AUTO: 0.3 % (ref 0–0.5)
KETONES UR QL STRIP: NEGATIVE
LEUKOCYTE ESTERASE UR QL STRIP.AUTO: NEGATIVE
LYMPHOCYTES # BLD AUTO: 1.39 10*3/MM3 (ref 0.7–3.1)
LYMPHOCYTES NFR BLD AUTO: 20.9 % (ref 19.6–45.3)
MCH RBC QN AUTO: 26.7 PG (ref 26.6–33)
MCHC RBC AUTO-ENTMCNC: 34 G/DL (ref 31.5–35.7)
MCV RBC AUTO: 78.6 FL (ref 79–97)
MONOCYTES # BLD AUTO: 0.72 10*3/MM3 (ref 0.1–0.9)
MONOCYTES NFR BLD AUTO: 10.8 % (ref 5–12)
NEUTROPHILS NFR BLD AUTO: 4.3 10*3/MM3 (ref 1.7–7)
NEUTROPHILS NFR BLD AUTO: 64.5 % (ref 42.7–76)
NITRITE UR QL STRIP: NEGATIVE
NRBC BLD AUTO-RTO: 0 /100 WBC (ref 0–0.2)
NT-PROBNP SERPL-MCNC: 900.8 PG/ML (ref 0–1800)
PH UR STRIP.AUTO: 5.5 [PH] (ref 5–9)
PLATELET # BLD AUTO: 269 10*3/MM3 (ref 140–450)
PMV BLD AUTO: 9.4 FL (ref 6–12)
POTASSIUM SERPL-SCNC: 4 MMOL/L (ref 3.5–5.2)
PROT SERPL-MCNC: 7.6 G/DL (ref 6–8.5)
PROT UR QL STRIP: ABNORMAL
QT INTERVAL: 364 MS
QTC INTERVAL: 464 MS
RBC # BLD AUTO: 3.78 10*6/MM3 (ref 4.14–5.8)
RBC # UR STRIP: ABNORMAL /HPF
REF LAB TEST METHOD: ABNORMAL
SARS-COV-2 RNA RESP QL NAA+PROBE: NOT DETECTED
SODIUM SERPL-SCNC: 131 MMOL/L (ref 136–145)
SP GR UR STRIP: 1.02 (ref 1–1.03)
SQUAMOUS #/AREA URNS HPF: ABNORMAL /HPF
TROPONIN T SERPL-MCNC: <0.01 NG/ML (ref 0–0.03)
UROBILINOGEN UR QL STRIP: ABNORMAL
WBC # UR STRIP: ABNORMAL /HPF
WBC NRBC COR # BLD: 6.66 10*3/MM3 (ref 3.4–10.8)
WHOLE BLOOD HOLD SPECIMEN: NORMAL
WHOLE BLOOD HOLD SPECIMEN: NORMAL

## 2021-12-16 PROCEDURE — 82962 GLUCOSE BLOOD TEST: CPT

## 2021-12-16 PROCEDURE — G0378 HOSPITAL OBSERVATION PER HR: HCPCS

## 2021-12-16 PROCEDURE — 63710000001 INSULIN DETEMIR PER 5 UNITS: Performed by: NURSE PRACTITIONER

## 2021-12-16 PROCEDURE — 93005 ELECTROCARDIOGRAM TRACING: CPT | Performed by: EMERGENCY MEDICINE

## 2021-12-16 PROCEDURE — 84484 ASSAY OF TROPONIN QUANT: CPT

## 2021-12-16 PROCEDURE — 99284 EMERGENCY DEPT VISIT MOD MDM: CPT

## 2021-12-16 PROCEDURE — 96361 HYDRATE IV INFUSION ADD-ON: CPT

## 2021-12-16 PROCEDURE — 71045 X-RAY EXAM CHEST 1 VIEW: CPT

## 2021-12-16 PROCEDURE — 93010 ELECTROCARDIOGRAM REPORT: CPT | Performed by: INTERNAL MEDICINE

## 2021-12-16 PROCEDURE — C9803 HOPD COVID-19 SPEC COLLECT: HCPCS

## 2021-12-16 PROCEDURE — 96360 HYDRATION IV INFUSION INIT: CPT

## 2021-12-16 PROCEDURE — 80053 COMPREHEN METABOLIC PANEL: CPT

## 2021-12-16 PROCEDURE — 83880 ASSAY OF NATRIURETIC PEPTIDE: CPT

## 2021-12-16 PROCEDURE — 87636 SARSCOV2 & INF A&B AMP PRB: CPT | Performed by: EMERGENCY MEDICINE

## 2021-12-16 PROCEDURE — 93005 ELECTROCARDIOGRAM TRACING: CPT

## 2021-12-16 PROCEDURE — 63710000001 INSULIN REGULAR HUMAN PER 5 UNITS: Performed by: EMERGENCY MEDICINE

## 2021-12-16 PROCEDURE — 63710000001 CYCLOSPORINE MODIFIED PER 100 MG: Performed by: NURSE PRACTITIONER

## 2021-12-16 PROCEDURE — 63710000001 CYCLOSPORINE MODIFIED PER 25 MG: Performed by: NURSE PRACTITIONER

## 2021-12-16 PROCEDURE — 81001 URINALYSIS AUTO W/SCOPE: CPT | Performed by: EMERGENCY MEDICINE

## 2021-12-16 PROCEDURE — 63710000001 INSULIN ASPART PER 5 UNITS: Performed by: NURSE PRACTITIONER

## 2021-12-16 PROCEDURE — 85025 COMPLETE CBC W/AUTO DIFF WBC: CPT

## 2021-12-16 RX ORDER — AMLODIPINE BESYLATE 10 MG/1
10 TABLET ORAL DAILY
Status: DISCONTINUED | OUTPATIENT
Start: 2021-12-17 | End: 2021-12-17 | Stop reason: HOSPADM

## 2021-12-16 RX ORDER — NICOTINE POLACRILEX 4 MG
15 LOZENGE BUCCAL
Status: DISCONTINUED | OUTPATIENT
Start: 2021-12-16 | End: 2021-12-17 | Stop reason: HOSPADM

## 2021-12-16 RX ORDER — CITALOPRAM 20 MG/1
20 TABLET ORAL DAILY
Status: DISCONTINUED | OUTPATIENT
Start: 2021-12-17 | End: 2021-12-17 | Stop reason: HOSPADM

## 2021-12-16 RX ORDER — POLYETHYLENE GLYCOL 3350 17 G/17G
17 POWDER, FOR SOLUTION ORAL DAILY PRN
Status: DISCONTINUED | OUTPATIENT
Start: 2021-12-16 | End: 2021-12-17 | Stop reason: HOSPADM

## 2021-12-16 RX ORDER — SODIUM CHLORIDE 9 MG/ML
100 INJECTION, SOLUTION INTRAVENOUS CONTINUOUS
Status: DISPENSED | OUTPATIENT
Start: 2021-12-16 | End: 2021-12-17

## 2021-12-16 RX ORDER — FAMOTIDINE 40 MG/1
40 TABLET, FILM COATED ORAL DAILY
Status: DISCONTINUED | OUTPATIENT
Start: 2021-12-16 | End: 2021-12-17 | Stop reason: HOSPADM

## 2021-12-16 RX ORDER — AMOXICILLIN 250 MG
2 CAPSULE ORAL 2 TIMES DAILY PRN
Status: DISCONTINUED | OUTPATIENT
Start: 2021-12-16 | End: 2021-12-17 | Stop reason: HOSPADM

## 2021-12-16 RX ORDER — DEXTROSE MONOHYDRATE 25 G/50ML
25 INJECTION, SOLUTION INTRAVENOUS
Status: DISCONTINUED | OUTPATIENT
Start: 2021-12-16 | End: 2021-12-17 | Stop reason: HOSPADM

## 2021-12-16 RX ORDER — BISACODYL 5 MG/1
5 TABLET, DELAYED RELEASE ORAL DAILY PRN
Status: DISCONTINUED | OUTPATIENT
Start: 2021-12-16 | End: 2021-12-17 | Stop reason: HOSPADM

## 2021-12-16 RX ORDER — SODIUM CHLORIDE 0.9 % (FLUSH) 0.9 %
10 SYRINGE (ML) INJECTION AS NEEDED
Status: DISCONTINUED | OUTPATIENT
Start: 2021-12-16 | End: 2021-12-17 | Stop reason: HOSPADM

## 2021-12-16 RX ORDER — BISACODYL 10 MG
10 SUPPOSITORY, RECTAL RECTAL DAILY PRN
Status: DISCONTINUED | OUTPATIENT
Start: 2021-12-16 | End: 2021-12-17 | Stop reason: HOSPADM

## 2021-12-16 RX ORDER — ISOSORBIDE DINITRATE 20 MG/1
20 TABLET ORAL EVERY 8 HOURS SCHEDULED
Status: DISCONTINUED | OUTPATIENT
Start: 2021-12-16 | End: 2021-12-17 | Stop reason: HOSPADM

## 2021-12-16 RX ORDER — ONDANSETRON 4 MG/1
4 TABLET, FILM COATED ORAL EVERY 6 HOURS PRN
Status: DISCONTINUED | OUTPATIENT
Start: 2021-12-16 | End: 2021-12-17 | Stop reason: HOSPADM

## 2021-12-16 RX ORDER — SODIUM CHLORIDE 9 MG/ML
125 INJECTION, SOLUTION INTRAVENOUS CONTINUOUS
Status: DISCONTINUED | OUTPATIENT
Start: 2021-12-16 | End: 2021-12-16

## 2021-12-16 RX ORDER — SODIUM CHLORIDE 0.9 % (FLUSH) 0.9 %
10 SYRINGE (ML) INJECTION EVERY 12 HOURS SCHEDULED
Status: DISCONTINUED | OUTPATIENT
Start: 2021-12-16 | End: 2021-12-17 | Stop reason: HOSPADM

## 2021-12-16 RX ORDER — ACETAMINOPHEN 325 MG/1
650 TABLET ORAL EVERY 4 HOURS PRN
Status: DISCONTINUED | OUTPATIENT
Start: 2021-12-16 | End: 2021-12-17 | Stop reason: HOSPADM

## 2021-12-16 RX ORDER — ROSUVASTATIN CALCIUM 5 MG/1
5 TABLET, COATED ORAL DAILY
Status: DISCONTINUED | OUTPATIENT
Start: 2021-12-17 | End: 2021-12-17 | Stop reason: HOSPADM

## 2021-12-16 RX ORDER — ONDANSETRON 2 MG/ML
4 INJECTION INTRAMUSCULAR; INTRAVENOUS EVERY 6 HOURS PRN
Status: DISCONTINUED | OUTPATIENT
Start: 2021-12-16 | End: 2021-12-17 | Stop reason: HOSPADM

## 2021-12-16 RX ORDER — LANOLIN ALCOHOL/MO/W.PET/CERES
5 CREAM (GRAM) TOPICAL NIGHTLY PRN
Status: DISCONTINUED | OUTPATIENT
Start: 2021-12-16 | End: 2021-12-17 | Stop reason: HOSPADM

## 2021-12-16 RX ORDER — ASPIRIN 81 MG/1
81 TABLET, CHEWABLE ORAL DAILY
Status: DISCONTINUED | OUTPATIENT
Start: 2021-12-17 | End: 2021-12-17 | Stop reason: HOSPADM

## 2021-12-16 RX ORDER — GABAPENTIN 100 MG/1
200 CAPSULE ORAL EVERY 8 HOURS SCHEDULED
Status: DISCONTINUED | OUTPATIENT
Start: 2021-12-16 | End: 2021-12-17 | Stop reason: HOSPADM

## 2021-12-16 RX ORDER — DOCUSATE SODIUM 100 MG/1
100 CAPSULE, LIQUID FILLED ORAL DAILY PRN
Status: DISCONTINUED | OUTPATIENT
Start: 2021-12-16 | End: 2021-12-17 | Stop reason: HOSPADM

## 2021-12-16 RX ADMIN — ISOSORBIDE DINITRATE 20 MG: 20 TABLET ORAL at 18:36

## 2021-12-16 RX ADMIN — SODIUM CHLORIDE 125 ML/HR: 9 INJECTION, SOLUTION INTRAVENOUS at 15:03

## 2021-12-16 RX ADMIN — ACETAMINOPHEN 650 MG: 325 TABLET, FILM COATED ORAL at 18:58

## 2021-12-16 RX ADMIN — INSULIN ASPART 6 UNITS: 100 INJECTION, SOLUTION INTRAVENOUS; SUBCUTANEOUS at 18:13

## 2021-12-16 RX ADMIN — FAMOTIDINE 40 MG: 40 TABLET, FILM COATED ORAL at 18:36

## 2021-12-16 RX ADMIN — SODIUM CHLORIDE 100 ML/HR: 9 INJECTION, SOLUTION INTRAVENOUS at 19:37

## 2021-12-16 RX ADMIN — CYCLOSPORINE 125 MG: 100 CAPSULE, LIQUID FILLED ORAL at 19:39

## 2021-12-16 RX ADMIN — INSULIN DETEMIR 20 UNITS: 100 INJECTION, SOLUTION SUBCUTANEOUS at 21:08

## 2021-12-16 RX ADMIN — GABAPENTIN 200 MG: 100 CAPSULE ORAL at 18:36

## 2021-12-16 RX ADMIN — HUMAN INSULIN 10 UNITS: 100 INJECTION, SOLUTION SUBCUTANEOUS at 16:24

## 2021-12-16 NOTE — ED NOTES
Patient presents to ED with c/o dehydration. The patient has been on two different Lasix pills and believes it has possibly dehydrated him.      Roberta Moser RN  12/16/21 [id number]

## 2021-12-16 NOTE — ED PROVIDER NOTES
Subjective   Patient presents with multiple complaints consistent with prior episodes of decreased renal function.  Patient notes that he has been on a diuretic.  Patient has been having about 1 month since his last bout of the similar symptoms.  Patient no shortness of breath weakness orthostasis dizziness and headache.  Patient denies any fevers.  Patient does arrive with elevated pulse at 107.  Patient has known heart transplant in the past.  No CHF.  Patient been on 40 mg of furosemide daily.  Family concerned with dehydration he may be on too much medication.          Review of Systems   Constitutional: Negative for appetite change, chills and fever.   HENT: Negative.  Negative for congestion.    Eyes: Negative.  Negative for photophobia and visual disturbance.   Respiratory: Negative.  Negative for cough, chest tightness and shortness of breath.    Cardiovascular: Negative.  Negative for chest pain and palpitations.   Gastrointestinal: Negative.  Negative for abdominal pain, constipation, diarrhea, nausea and vomiting.   Endocrine: Negative.    Genitourinary: Positive for decreased urine volume. Negative for dysuria, flank pain and hematuria.   Musculoskeletal: Negative.  Negative for arthralgias, back pain, myalgias, neck pain and neck stiffness.   Skin: Negative.  Negative for pallor.   Neurological: Positive for dizziness and weakness. Negative for syncope, light-headedness, numbness and headaches.   Psychiatric/Behavioral: Negative.  Negative for confusion and suicidal ideas. The patient is not nervous/anxious.    All other systems reviewed and are negative.      Past Medical History:   Diagnosis Date   • Chronic kidney disease    • Diabetes mellitus (HCC)    • GERD (gastroesophageal reflux disease)    • HFrEF (heart failure with reduced ejection fraction) ([facility])    • Hypertension        Allergies   Allergen Reactions   • Levofloxacin Anaphylaxis       Past Surgical History:   Procedure Laterality Date   •  HEART TRANSPLANT     • HERNIA REPAIR         Family History   Problem Relation Age of Onset   • Heart disease Mother    • Heart disease Father    • Heart disease Brother    • Diabetes Brother        Social History     Socioeconomic History   • Marital status:    Tobacco Use   • Smoking status: Former Smoker   • Smokeless tobacco: Former User   Substance and Sexual Activity   • Alcohol use: Never   • Drug use: Never           Objective   Physical Exam  Vitals and nursing note reviewed.   Constitutional:       General: He is not in acute distress.     Appearance: He is well-developed. He is not ill-appearing or toxic-appearing.   HENT:      Head: Normocephalic and atraumatic.      Mouth/Throat:      Mouth: Mucous membranes are moist.   Eyes:      Extraocular Movements: Extraocular movements intact.      Conjunctiva/sclera: Conjunctivae normal.      Pupils: Pupils are equal, round, and reactive to light.   Neck:      Vascular: No JVD.   Cardiovascular:      Rate and Rhythm: Normal rate and regular rhythm.      Heart sounds: Normal heart sounds. No murmur heard.  No friction rub. No gallop.    Pulmonary:      Effort: Pulmonary effort is normal. No respiratory distress.      Breath sounds: No wheezing or rales.   Chest:      Chest wall: No tenderness.   Abdominal:      General: Bowel sounds are normal. There is no distension.      Palpations: Abdomen is soft. There is no mass.      Tenderness: There is no abdominal tenderness. There is no guarding or rebound.   Musculoskeletal:         General: Normal range of motion.      Cervical back: Normal range of motion and neck supple.   Skin:     General: Skin is warm and dry.      Capillary Refill: Capillary refill takes less than 2 seconds.   Neurological:      General: No focal deficit present.      Mental Status: He is alert and oriented to person, place, and time.   Psychiatric:         Behavior: Behavior normal.         Thought Content: Thought content normal.          Judgment: Judgment normal.         Procedures           ED Course  ED Course as of 12/16/21 1856   Thu Dec 16, 2021   1702 Patient with SHAQ dehydration and some orthostasis.  Patient is on Lasix patient be admitted for hydration and better glycemic control. [PC]      ED Course User Index  [PC] Ramirez Houston MD                                         Labs Reviewed   COMPREHENSIVE METABOLIC PANEL - Abnormal; Notable for the following components:       Result Value    Glucose 449 (*)     BUN 43 (*)     Creatinine 2.56 (*)     Sodium 131 (*)     Chloride 95 (*)     CO2 21.0 (*)     Albumin 3.30 (*)     eGFR Non  Amer 24 (*)     All other components within normal limits    Narrative:     GFR Normal >60  Chronic Kidney Disease <60  Kidney Failure <15     CBC WITH AUTO DIFFERENTIAL - Abnormal; Notable for the following components:    RBC 3.78 (*)     Hemoglobin 10.1 (*)     Hematocrit 29.7 (*)     MCV 78.6 (*)     All other components within normal limits   URINALYSIS W/ MICROSCOPIC IF INDICATED (NO CULTURE) - Abnormal; Notable for the following components:    Glucose,  mg/dL (2+) (*)     Blood, UA Trace (*)     Protein,  mg/dL (2+) (*)     All other components within normal limits   URINALYSIS, MICROSCOPIC ONLY - Abnormal; Notable for the following components:    RBC, UA 3-5 (*)     Bacteria, UA 1+ (*)     Squamous Epithelial Cells, UA 3-5 (*)     All other components within normal limits   POCT GLUCOSE FINGERSTICK - Abnormal; Notable for the following components:    Glucose 363 (*)     All other components within normal limits   COVID-19 AND FLU A/B, NP SWAB IN TRANSPORT MEDIA 8-12 HR TAT - Normal    Narrative:     Fact sheet for providers: https://www.fda.gov/media/193111/download    Fact sheet for patients: https://www.fda.gov/media/422877/download    Test performed by PCR.   BNP (IN-HOUSE) - Normal    Narrative:     Among patients with dyspnea, NT-proBNP is highly sensitive for the detection  of acute congestive heart failure. In addition NT-proBNP of <300 pg/ml effectively rules out acute congestive heart failure with 99% negative predictive value.    Results may be falsely decreased if patient taking Biotin.     TROPONIN (IN-HOUSE) - Normal    Narrative:     Troponin T Reference Range:  <= 0.03 ng/mL-   Negative for AMI  >0.03 ng/mL-     Abnormal for myocardial necrosis.  Clinicians would have to utilize clinical acumen, EKG, Troponin and serial changes to determine if it is an Acute Myocardial Infarction or myocardial injury due to an underlying chronic condition.       Results may be falsely decreased if patient taking Biotin.     RAINBOW DRAW    Narrative:     The following orders were created for panel order Milo Draw.  Procedure                               Abnormality         Status                     ---------                               -----------         ------                     Green Top (Gel)[[id number]]                                  Final result               Lavender Top[[id number]]                                     Final result               Gold Top - SST[[id number]]                                   Final result               Light Blue Top[[id number]]                                   Final result                 Please view results for these tests on the individual orders.   POCT GLUCOSE FINGERSTICK   POCT GLUCOSE FINGERSTICK   POCT GLUCOSE FINGERSTICK   POCT GLUCOSE FINGERSTICK   POCT GLUCOSE FINGERSTICK   POCT GLUCOSE FINGERSTICK   POCT GLUCOSE FINGERSTICK   POCT GLUCOSE FINGERSTICK   CBC AND DIFFERENTIAL    Narrative:     The following orders were created for panel order CBC & Differential.  Procedure                               Abnormality         Status                     ---------                               -----------         ------                     CBC Auto Differential[[id number]]        Abnormal            Final result                 Please view results  for these tests on the individual orders.   GREEN TOP   LAVENDER TOP   GOLD TOP - SST   LIGHT BLUE TOP       XR Chest 1 View   Final Result   No acute cardiopulmonary process.      Electronically signed by:  Amy Deng MD  12/16/2021   3:58 PM [facility] Workstation: [id number]                    [facility]    Final diagnoses:   SHAQ (acute kidney injury) (HCC)   Dehydration   Orthostasis       ED Disposition  ED Disposition     ED Disposition Condition Comment    Decision to Admit  Level of Care: Med/Surg [1]   Diagnosis: SHAQ (acute kidney injury) (HCC) [[id number]]   Admitting Physician: OSKAR SILVERIO [[id number]]   Attending Physician: OSKAR SILVERIO [[id number]]            No follow-up provider specified.       Medication List      No changes were made to your prescriptions during this visit.          Ramirez Houston MD  12/16/21 3714

## 2021-12-16 NOTE — H&P
"      [facility] Medicine Admission      Date of Admission: 12/16/2021      Primary Care Physician: Cam Cortez MD      Chief Complaint: \"I feel dehydrated\"    HPI: 76-year-old  male with past medical history of chronic kidney disease, type 2 diabetes, hypertension, CHF with reduced ejection fraction, gastroesophageal reflux disease, history of heart transplant who presented on 12/16/2021 with complaints of feeling dehydrated.  Patient reports that over the course of the past 5 to 7 days he has felt weak, had increased heart rate, dizziness, headaches, and arm and leg cramps.  He reports having 2 different bottles of Lasix at home and believes he may have been taking more than was instructed [facility] [place].  He has been evaluated in the emergency department and has elevated creatinine level of 2.56 up from baseline of 1.8-1.9.  Hospitalist team has been asked to observe due to acute kidney injury/dehydration.    Concurrent Medical History:  has a past medical history of Chronic kidney disease, Diabetes mellitus ([facility]), GERD (gastroesophageal reflux disease), HFrEF (heart failure with reduced ejection fraction) ([facility]), and Hypertension.    Past Surgical History:  has a past surgical history that includes Heart transplant and Hernia repair.    Family History: family history includes Diabetes in his brother; Heart disease in his brother, father, and mother.    Social History:  reports that he has quit smoking. He has quit using smokeless tobacco. He reports that he does not drink alcohol and does not use drugs.    Allergies:   Allergies   Allergen Reactions   • Levofloxacin Anaphylaxis       Medications:   Prior to Admission medications    Medication Sig Start Date End Date Taking? Authorizing Provider   amLODIPine (NORVASC) 10 MG tablet Take 10 mg by mouth Daily.    Provider, MD Shabana   aspirin 81 MG chewable tablet Chew 81 mg Daily. " 10/5/18   Shabana Khan MD   bacitracin 500 UNIT/GM ointment Apply  topically to the appropriate area as directed 2 (Two) Times a Day. 10/1/19   Kim Dobbs MD   calcitriol (ROCALTROL) 0.25 MCG capsule Take 0.25 mcg by mouth.    Shabana Khan MD   calcium-vitamin D (OSCAL-500) 500-200 MG-UNIT per tablet Take 1 tablet by mouth Daily.    Shabana Khan MD   citalopram (CeleXA) 20 MG tablet Take 20 mg by mouth Daily. 10/4/18   Shabana Khan MD   cycloSPORINE modified (GENGRAF) 100 MG capsule Take 125 mg by mouth 2 (Two) Times a Day.    Shabana Khan MD   docusate sodium (COLACE) 100 MG capsule Take 100 mg by mouth As Needed for Constipation.    Shabana Khan MD   doxazosin (CARDURA) 8 MG tablet Take 8 mg by mouth.    Shabana Khan MD   ezetimibe (ZETIA) 10 MG tablet Take 10 mg by mouth Daily. 10/4/18   Shabana Khan MD   furosemide (LASIX) 40 MG tablet Take 40 mg by mouth.    Shabana Khan MD   gabapentin (NEURONTIN) 100 MG capsule Take 200 mg by mouth 3 (Three) Times a Day. 10/4/18   Shabana Khan MD   hydrALAZINE (APRESOLINE) 10 MG tablet Take 10 mg by mouth 3 (Three) Times a Day.    Shabana Khan MD   insulin aspart (novoLOG) 100 UNIT/ML injection Inject 38 Units under the skin into the appropriate area as directed 3 (Three) Times a Day.    Shabana Khan MD   insulin glargine (LANTUS) 100 UNIT/ML injection Inject 20 Units under the skin into the appropriate area as directed Daily. 10/4/18   Shabana Khan MD   isosorbide dinitrate (ISORDIL) 20 MG tablet Take 20 mg by mouth 3 (Three) Times a Day. 10/4/18   Shabana Khan MD   metoprolol succinate XL (TOPROL-XL) 50 MG 24 hr tablet Take 50 mg by mouth Daily. 10/5/18   Shabana Khan MD   Omega-3 Fatty Acids (FISH OIL) 1000 MG capsule capsule Take 1 capsule by mouth. 10/4/18   Shabana Khna MD   potassium chloride (KLOR-CON) 20 MEQ packet Take  60 mEq by mouth.    Shabana Khan MD   rosuvastatin (CRESTOR) 5 MG tablet Take 5 mg by mouth Daily.    Shabana Khan MD   Sennosides 15 MG chewable tablet Chew 2 tablets As Needed.    Shabana Khan MD   sulfamethoxazole-trimethoprim (BACTRIM DS,SEPTRA DS) 800-160 MG per tablet Take 1 tablet by mouth 1 (One) Time Per Week.    Shabana Khan MD       Review of Systems:  Review of Systems   Constitutional: Positive for fatigue. Negative for activity change, appetite change, chills and fever.   HENT: Negative for congestion, rhinorrhea and sore throat.    Respiratory: Negative for cough, shortness of breath and wheezing.    Cardiovascular: Negative for chest pain, palpitations and leg swelling.   Gastrointestinal: Negative for abdominal pain, diarrhea, nausea and vomiting.   Genitourinary: Positive for decreased urine volume. Negative for dysuria.        Dark urine   Musculoskeletal: Positive for myalgias. Negative for back pain.        Muscle cramps   Skin: Negative for pallor.   Neurological: Positive for weakness and headaches.   Psychiatric/Behavioral: Negative for confusion. The patient is not nervous/anxious.             Physical Exam:   Temp:  [99.1 °F (37.3 °C)] 99.1 °F (37.3 °C)  Heart Rate:  [] 85  Resp:  [20] 20  BP: (145-171)/(69-80) 168/74  Physical Exam  Vitals and nursing note reviewed.   Constitutional:       General: He is not in acute distress.     Appearance: Normal appearance. He is ill-appearing.      Comments: Chronically ill-appearing   HENT:      Head: Normocephalic and atraumatic.      Right Ear: External ear normal.      Left Ear: External ear normal.      Nose: Nose normal.      Mouth/Throat:      Mouth: Mucous membranes are dry.      Pharynx: Oropharynx is clear.   Eyes:      General: No scleral icterus.        Right eye: No discharge.         Left eye: No discharge.      Conjunctiva/sclera: Conjunctivae normal.   Cardiovascular:      Rate and Rhythm:  Regular rhythm. Tachycardia present.      Pulses: Normal pulses.      Heart sounds: Normal heart sounds. No murmur heard.  No friction rub. No gallop.    Pulmonary:      Effort: Pulmonary effort is normal. No respiratory distress.      Breath sounds: Normal breath sounds. No stridor. No wheezing, rhonchi or rales.   Abdominal:      General: Bowel sounds are normal. There is no distension.      Palpations: Abdomen is soft.      Tenderness: There is no abdominal tenderness.   Musculoskeletal:         General: No swelling. Normal range of motion.      Cervical back: Normal range of motion and neck supple.   Skin:     General: Skin is warm and dry.      Coloration: Skin is pale.      Comments: Dry skin turgor   Neurological:      General: No focal deficit present.      Mental Status: He is alert and oriented to person, place, and time.   Psychiatric:         Mood and Affect: Mood normal.         Behavior: Behavior normal.           Results Reviewed:  I have personally reviewed current lab, radiology, and data and agree with results.  Lab Results (last 24 hours)     Procedure Component Value Units Date/Time    COVID-19 and FLU A/B PCR - Swab, Nasopharynx [[id number]]  (Normal) Collected: 12/16/21 1519    Specimen: Swab from Nasopharynx Updated: 12/16/21 1545     COVID19 Not Detected     Influenza A PCR Not Detected     Influenza B PCR Not Detected    Narrative:      Fact sheet for providers: https://www.fda.gov/media/097472/download    Fact sheet for patients: https://www.fda.gov/media/368960/download    Test performed by PCR.    Urinalysis, Microscopic Only - Urine, Clean Catch [[id number]]  (Abnormal) Collected: 12/16/21 1415    Specimen: Urine, Clean Catch Updated: 12/16/21 1437     RBC, UA 3-5 /HPF      WBC, UA 0-2 /HPF      Bacteria, UA 1+ /HPF      Squamous Epithelial Cells, UA 3-5 /HPF      Hyaline Casts, UA 3-6 /LPF      Granular Casts, UA 7-12 /LPF      Methodology Automated Microscopy    [place] Draw [[id number]]  Collected: 12/16/21 1316    Specimen: Blood Updated: 12/16/21 1432    Narrative:      The following orders were created for panel order [place] Draw.  Procedure                               Abnormality         Status                     ---------                               -----------         ------                     Green Top (Gel)[[id number]]                                  Final result               Lavender Top[[id number]]                                     Final result               Gold Top - SST[[id number]]                                   Final result               Light Blue Top[[id number]]                                   Final result                 Please view results for these tests on the individual orders.    Lavender Top [[id number]] Collected: 12/16/21 1316    Specimen: Blood Updated: 12/16/21 1432     Extra Tube hold for add-on     Comment: Auto resulted       Light Blue Top [[id number]] Collected: 12/16/21 1316    Specimen: Blood Updated: 12/16/21 1432     Extra Tube hold for add-on     Comment: Auto resulted       Green Top (Gel) [[id number]] Collected: 12/16/21 1316    Specimen: Blood Updated: 12/16/21 1432     Extra Tube Hold for add-ons.     Comment: Auto resulted.       Gold Top - SST [[id number]] Collected: 12/16/21 1316    Specimen: Blood Updated: 12/16/21 1432     Extra Tube Hold for add-ons.     Comment: Auto resulted.       Urinalysis With Microscopic If Indicated (No Culture) - Urine, Clean Catch [[id number]]  (Abnormal) Collected: 12/16/21 1415    Specimen: Urine, Clean Catch Updated: 12/16/21 1423     Color, UA Yellow     Appearance, UA Clear     pH, UA 5.5     Specific Gravity, UA 1.020     Glucose,  mg/dL (2+)     Ketones, UA Negative     Bilirubin, UA Negative     Blood, UA Trace     Protein,  mg/dL (2+)     Leuk Esterase, UA Negative     Nitrite, UA Negative     Urobilinogen, UA 0.2 E.U./dL    Comprehensive Metabolic Panel [[id number]]  (Abnormal) Collected: 12/16/21  1316    Specimen: Blood Updated: 12/16/21 1357     Glucose 449 mg/dL      BUN 43 mg/dL      Creatinine 2.56 mg/dL      Sodium 131 mmol/L      Potassium 4.0 mmol/L      Chloride 95 mmol/L      CO2 21.0 mmol/L      Calcium 8.7 mg/dL      Total Protein 7.6 g/dL      Albumin 3.30 g/dL      ALT (SGPT) 9 U/L      AST (SGOT) 22 U/L      Alkaline Phosphatase 92 U/L      Total Bilirubin 0.3 mg/dL      eGFR Non African Amer 24 mL/min/1.73      Globulin 4.3 gm/dL      A/G Ratio 0.8 g/dL      BUN/Creatinine Ratio 16.8     Anion Gap 15.0 mmol/L     Narrative:      GFR Normal >60  Chronic Kidney Disease <60  Kidney Failure <15      Troponin [[id number]]  (Normal) Collected: 12/16/21 1316    Specimen: Blood Updated: 12/16/21 1352     Troponin T <0.010 ng/mL     Narrative:      Troponin T Reference Range:  <= 0.03 ng/mL-   Negative for AMI  >0.03 ng/mL-     Abnormal for myocardial necrosis.  Clinicians would have to utilize clinical acumen, EKG, Troponin and serial changes to determine if it is an Acute Myocardial Infarction or myocardial injury due to an underlying chronic condition.       Results may be falsely decreased if patient taking Biotin.      BNP [[id number]]  (Normal) Collected: 12/16/21 1316    Specimen: Blood Updated: 12/16/21 1351     proBNP 900.8 pg/mL     Narrative:      Among patients with dyspnea, NT-proBNP is highly sensitive for the detection of acute congestive heart failure. In addition NT-proBNP of <300 pg/ml effectively rules out acute congestive heart failure with 99% negative predictive value.    Results may be falsely decreased if patient taking Biotin.      CBC & Differential [[id number]]  (Abnormal) Collected: 12/16/21 1316    Specimen: Blood Updated: 12/16/21 1326    Narrative:      The following orders were created for panel order CBC & Differential.  Procedure                               Abnormality         Status                     ---------                               -----------         ------                      CBC Auto Differential[[id number]]        Abnormal            Final result                 Please view results for these tests on the individual orders.    CBC Auto Differential [[id number]]  (Abnormal) Collected: 12/16/21 1316    Specimen: Blood Updated: 12/16/21 1326     WBC 6.66 10*3/mm3      RBC 3.78 10*6/mm3      Hemoglobin 10.1 g/dL      Hematocrit 29.7 %      MCV 78.6 fL      MCH 26.7 pg      MCHC 34.0 g/dL      RDW 13.2 %      RDW-SD 37.4 fl      MPV 9.4 fL      Platelets 269 10*3/mm3      Neutrophil % 64.5 %      Lymphocyte % 20.9 %      Monocyte % 10.8 %      Eosinophil % 3.0 %      Basophil % 0.5 %      Immature Grans % 0.3 %      Neutrophils, Absolute 4.30 10*3/mm3      Lymphocytes, Absolute 1.39 10*3/mm3      Monocytes, Absolute 0.72 10*3/mm3      Eosinophils, Absolute 0.20 10*3/mm3      Basophils, Absolute 0.03 10*3/mm3      Immature Grans, Absolute 0.02 10*3/mm3      nRBC 0.0 /100 WBC         Imaging Results (Last 24 Hours)     Procedure Component Value Units Date/Time    XR Chest 1 View [[id number]] Collected: 12/16/21 1528     Updated: 12/16/21 1600    Narrative:      EXAM: XR CHEST 1 VIEW    COMPARISONS: Radiograph 11/15/2021    INDICATION: sob    FINDINGS:  Frontal view of the chest.    Lungs are symmetric and adequately expanded. No focal  consolidation, effusion, or pneumothorax. Heart size is normal.  No acute osseous abnormalities. Sternotomy wires are noted.      Impression:      No acute cardiopulmonary process.    Electronically signed by:  Amy Deng MD  12/16/2021  3:58 PM [facility] Workstation: [id number]            Assessment:    Active Hospital Problems    Diagnosis    • SHAQ (acute kidney injury) ([facility])              Plan:  #1.  Acute kidney injury/dehydration: Creatinine level increased to 2.56 up from baseline of 1.8-1.9.  Diuretics placed on hold.  Will receive 1 L normal saline.  Recheck BMP in a.m.  Avoid nephrotoxins.  2.  History of heart transplant: Continue  antirejection meds per home medication reconciliation.  3.  CHF with reduced ejection fraction: No acute exacerbation.  Diuretics on hold due to dehydration.  Continue home meds when reconciled.  4.  Type 2 diabetes: Hyperglycemia noted.  Fingerstick blood sugars before meals and at bedtime.  Sliding scale coverage.  Levemir twice daily.  Hypertension: Continue home meds when reconciled.    Further orders will depend upon hospital course.  Plan of care discussed with patient.  CODE STATUS confirmed with patient and his wishes are for full CODE STATUS in the event of emergency.  Med rec was attempted, however, not yet complete as patient is unsure of entire list of medications.  Home medication list has been requested and daughter is to bring in to nursing staff for review.    I confirmed that the patient's Advance Care Plan is present, code status is documented, or surrogate decision maker is listed in the patient's medical record.      I have utilized all available immediate resources to obtain, update, or review the patient's current medications.          This document has been electronically signed by ADOLPH Mcguire on December 16, 2021 17:03 CST

## 2021-12-17 VITALS
HEIGHT: 67 IN | DIASTOLIC BLOOD PRESSURE: 70 MMHG | WEIGHT: 197.56 LBS | HEART RATE: 85 BPM | BODY MASS INDEX: 31.01 KG/M2 | OXYGEN SATURATION: 95 % | TEMPERATURE: 97.7 F | RESPIRATION RATE: 18 BRPM | SYSTOLIC BLOOD PRESSURE: 152 MMHG

## 2021-12-17 LAB
ANION GAP SERPL CALCULATED.3IONS-SCNC: 11 MMOL/L (ref 5–15)
BUN SERPL-MCNC: 40 MG/DL (ref 8–23)
BUN/CREAT SERPL: 19 (ref 7–25)
CALCIUM SPEC-SCNC: 8.4 MG/DL (ref 8.6–10.5)
CHLORIDE SERPL-SCNC: 105 MMOL/L (ref 98–107)
CO2 SERPL-SCNC: 25 MMOL/L (ref 22–29)
CREAT SERPL-MCNC: 2.11 MG/DL (ref 0.76–1.27)
GFR SERPL CREATININE-BSD FRML MDRD: 31 ML/MIN/1.73
GLUCOSE BLDC GLUCOMTR-MCNC: 149 MG/DL (ref 70–130)
GLUCOSE BLDC GLUCOMTR-MCNC: 293 MG/DL (ref 70–130)
GLUCOSE BLDC GLUCOMTR-MCNC: 300 MG/DL (ref 70–130)
GLUCOSE BLDC GLUCOMTR-MCNC: 323 MG/DL (ref 70–130)
GLUCOSE BLDC GLUCOMTR-MCNC: 407 MG/DL (ref 70–130)
GLUCOSE BLDC GLUCOMTR-MCNC: 476 MG/DL (ref 70–130)
GLUCOSE BLDC GLUCOMTR-MCNC: 73 MG/DL (ref 70–130)
GLUCOSE SERPL-MCNC: 73 MG/DL (ref 65–99)
POTASSIUM SERPL-SCNC: 3.5 MMOL/L (ref 3.5–5.2)
SODIUM SERPL-SCNC: 141 MMOL/L (ref 136–145)
WHOLE BLOOD HOLD SPECIMEN: NORMAL

## 2021-12-17 PROCEDURE — 63710000001 CYCLOSPORINE MODIFIED PER 100 MG: Performed by: NURSE PRACTITIONER

## 2021-12-17 PROCEDURE — 82962 GLUCOSE BLOOD TEST: CPT

## 2021-12-17 PROCEDURE — 63710000001 CYCLOSPORINE MODIFIED PER 25 MG: Performed by: NURSE PRACTITIONER

## 2021-12-17 PROCEDURE — G0378 HOSPITAL OBSERVATION PER HR: HCPCS

## 2021-12-17 PROCEDURE — 63710000001 INSULIN ASPART PER 5 UNITS: Performed by: NURSE PRACTITIONER

## 2021-12-17 PROCEDURE — 63710000001 INSULIN DETEMIR PER 5 UNITS: Performed by: NURSE PRACTITIONER

## 2021-12-17 PROCEDURE — 36415 COLL VENOUS BLD VENIPUNCTURE: CPT | Performed by: NURSE PRACTITIONER

## 2021-12-17 PROCEDURE — 96361 HYDRATE IV INFUSION ADD-ON: CPT

## 2021-12-17 PROCEDURE — 80048 BASIC METABOLIC PNL TOTAL CA: CPT | Performed by: NURSE PRACTITIONER

## 2021-12-17 RX ORDER — MULTIPLE VITAMINS W/ MINERALS TAB 9MG-400MCG
1 TAB ORAL DAILY
COMMUNITY

## 2021-12-17 RX ORDER — FUROSEMIDE 40 MG/1
20 TABLET ORAL DAILY
Start: 2021-12-17

## 2021-12-17 RX ORDER — AZATHIOPRINE 50 MG/1
100 TABLET ORAL DAILY
COMMUNITY

## 2021-12-17 RX ADMIN — ROSUVASTATIN CALCIUM 5 MG: 5 TABLET, FILM COATED ORAL at 08:01

## 2021-12-17 RX ADMIN — SODIUM CHLORIDE, PRESERVATIVE FREE 10 ML: 5 INJECTION INTRAVENOUS at 08:02

## 2021-12-17 RX ADMIN — Medication 1 TABLET: at 08:01

## 2021-12-17 RX ADMIN — CITALOPRAM HYDROBROMIDE 20 MG: 20 TABLET ORAL at 08:02

## 2021-12-17 RX ADMIN — FAMOTIDINE 40 MG: 40 TABLET, FILM COATED ORAL at 08:01

## 2021-12-17 RX ADMIN — INSULIN DETEMIR 20 UNITS: 100 INJECTION, SOLUTION SUBCUTANEOUS at 08:02

## 2021-12-17 RX ADMIN — INSULIN ASPART 5 UNITS: 100 INJECTION, SOLUTION INTRAVENOUS; SUBCUTANEOUS at 11:41

## 2021-12-17 RX ADMIN — ASPIRIN 81 MG: 81 TABLET, CHEWABLE ORAL at 08:02

## 2021-12-17 RX ADMIN — GABAPENTIN 200 MG: 100 CAPSULE ORAL at 04:53

## 2021-12-17 RX ADMIN — ISOSORBIDE DINITRATE 20 MG: 20 TABLET ORAL at 04:54

## 2021-12-17 RX ADMIN — CYCLOSPORINE 125 MG: 100 CAPSULE, LIQUID FILLED ORAL at 08:01

## 2021-12-17 RX ADMIN — AMLODIPINE BESYLATE 10 MG: 10 TABLET ORAL at 08:02

## 2021-12-17 NOTE — PLAN OF CARE
Goal Outcome Evaluation:  Plan of Care Reviewed With: patient        Progress: improving  Outcome Summary: Discharging today

## 2021-12-17 NOTE — PLAN OF CARE
Problem: Electrolyte Imbalance (Acute Kidney Injury/Impairment)  Goal: Serum Electrolyte Balance  12/17/2021 0935 by Smith Israel RN  Outcome: Met  12/17/2021 0934 by Smith Israel RN  Outcome: Ongoing, Progressing     Problem: Fluid Imbalance (Acute Kidney Injury/Impairment)  Goal: Optimal Fluid Balance  12/17/2021 0935 by Smith Israel RN  Outcome: Met  12/17/2021 0934 by Smith Israel RN  Outcome: Ongoing, Progressing     Problem: Hematologic Alteration (Acute Kidney Injury/Impairment)  Goal: Hemoglobin, Hematocrit and Platelets Within Normal Range  12/17/2021 0935 by Smith Israel RN  Outcome: Met  12/17/2021 0934 by Smith Israel RN  Outcome: Ongoing, Progressing     Problem: Oral Intake Inadequate (Acute Kidney Injury/Impairment)  Goal: Optimal Nutrition Intake  12/17/2021 0935 by Smith Israel RN  Outcome: Met  12/17/2021 0934 by Smith Israel RN  Outcome: Ongoing, Progressing     Problem: Renal Function Impairment (Acute Kidney Injury/Impairment)  Goal: Effective Renal Function  12/17/2021 0935 by Smith Israel RN  Outcome: Met  12/17/2021 0934 by Smith Israel RN  Outcome: Ongoing, Progressing     Problem: Pain Acute  Goal: Optimal Pain Control  12/17/2021 0935 by Smith Israel RN  Outcome: Met  12/17/2021 0934 by Smith Israel RN  Outcome: Ongoing, Progressing  Intervention: Optimize Psychosocial Wellbeing  Recent Flowsheet Documentation  Taken 12/17/2021 0850 by Smith Israel RN  Supportive Measures:   active listening utilized   self-care encouraged  Diversional Activities: television     Problem: Diabetes Comorbidity  Goal: Blood Glucose Level Within Desired Range  12/17/2021 0935 by Smith Israel RN  Outcome: Met  12/17/2021 0934 by Smith Israel RN  Outcome: Ongoing, Progressing     Problem: Adult Inpatient Plan of Care  Goal: Plan of Care Review  12/17/2021 0935 by Smith Israel RN  Outcome:  Met  12/17/2021 0934 by Smith Israel RN  Outcome: Ongoing, Progressing  Flowsheets (Taken 12/17/2021 0934)  Progress: improving  Plan of Care Reviewed With: patient  Outcome Summary: Discharging today  Goal: Patient-Specific Goal (Individualized)  12/17/2021 0935 by Smith Israel RN  Outcome: Met  12/17/2021 0934 by Smith Israel RN  Outcome: Ongoing, Progressing  Goal: Absence of Hospital-Acquired Illness or Injury  12/17/2021 0935 by Smith Israel RN  Outcome: Met  12/17/2021 0934 by Smith Israel RN  Outcome: Ongoing, Progressing  Intervention: Identify and Manage Fall Risk  Recent Flowsheet Documentation  Taken 12/17/2021 0900 by Smith Israel RN  Safety Promotion/Fall Prevention:   fall prevention program maintained   nonskid shoes/slippers when out of bed   safety round/check completed  Taken 12/17/2021 0700 by Smith Israel RN  Safety Promotion/Fall Prevention:   fall prevention program maintained   nonskid shoes/slippers when out of bed   safety round/check completed  Intervention: Prevent and Manage VTE (venous thromboembolism) Risk  Recent Flowsheet Documentation  Taken 12/17/2021 0850 by Smith Israel RN  VTE Prevention/Management:   bilateral   sequential compression devices on  Goal: Optimal Comfort and Wellbeing  12/17/2021 0935 by Smith Israel RN  Outcome: Met  12/17/2021 0934 by Smith Israel RN  Outcome: Ongoing, Progressing  Intervention: Provide Person-Centered Care  Recent Flowsheet Documentation  Taken 12/17/2021 0850 by Smith Israel RN  Trust Relationship/Rapport:   care explained   choices provided   empathic listening provided   questions encouraged   questions answered   reassurance provided   thoughts/feelings acknowledged  Goal: Readiness for Transition of Care  12/17/2021 0935 by Smith Israel RN  Outcome: Met  12/17/2021 0934 by Smith Israel RN  Outcome: Ongoing, Progressing     Problem: Fall Injury  Risk  Goal: Absence of Fall and Fall-Related Injury  12/17/2021 0935 by Smith Israel, RN  Outcome: Met  12/17/2021 0934 by Smith Israel, RN  Outcome: Ongoing, Progressing  Intervention: Promote Injury-Free Environment  Recent Flowsheet Documentation  Taken 12/17/2021 0900 by Smith Israel, RN  Safety Promotion/Fall Prevention:   fall prevention program maintained   nonskid shoes/slippers when out of bed   safety round/check completed  Taken 12/17/2021 0700 by Smith Israel, RN  Safety Promotion/Fall Prevention:   fall prevention program maintained   nonskid shoes/slippers when out of bed   safety round/check completed   Goal Outcome Evaluation:  Plan of Care Reviewed With: patient        Progress: improving  Outcome Summary: Discharging today

## 2021-12-17 NOTE — PLAN OF CARE
Goal Outcome Evaluation:  Plan of Care Reviewed With: patient        Progress: no change  Outcome Summary: vss; no s/s of distress this shift; no new complaints; resting comfortably; will continue to monitor

## 2021-12-17 NOTE — DISCHARGE SUMMARY
[facility] Medicine Services  DISCHARGE SUMMARY       Date of Admission: 12/16/2021  Date of Discharge:  12/17/2021  Primary Care Physician: Cam Cortez MD    Presenting Problem/History of Present Illness:  Dehydration [E86.0]  Orthostasis [I95.1]  SHAQ (acute kidney injury) (Formerly [facility]) [N17.9]       Final Discharge Diagnoses:  Active Hospital Problems    Diagnosis    • SHAQ (acute kidney injury) (Formerly [facility])        Consults:   Consults     No orders found for last 30 day(s).          Procedures Performed:                 Pertinent Test Results:   Lab Results (last 24 hours)     Procedure Component Value Units Date/Time    POC Glucose Once [[id number]]  (Abnormal) Collected: 12/17/21 1048    Specimen: Blood Updated: 12/17/21 1106     Glucose 300 mg/dL      Comment: RN NotifiedOperator: [id number] CARLOTTA TAKELAMeter ID: [id number]       POC Glucose Once [[id number]]  (Abnormal) Collected: 12/16/21 1708    Specimen: Blood Updated: 12/17/21 1103     Glucose 407 mg/dL      Comment: RN NotifiedOperator: [id number] SUDHIR CALLIEMeter ID: [id number]       POC Glucose Once [[id number]]  (Abnormal) Collected: 12/16/21 1621    Specimen: Blood Updated: 12/17/21 1103     Glucose 476 mg/dL      Comment: Result Not ConfirmedOperator: [id number] JOSE E STAFANIEMeter ID: [id number]       Extra Tubes [[id number]] Collected: 12/17/21 0840    Specimen: Blood, Venous Line Updated: 12/17/21 0946    Narrative:      The following orders were created for panel order Extra Tubes.  Procedure                               Abnormality         Status                     ---------                               -----------         ------                     Lavender Top[[id number]]                                     Final result                 Please view results for these tests on the individual orders.    Lavender Top [[id number]] Collected: 12/17/21 0840    Specimen: Blood Updated: 12/17/21 0946     Extra  Tube hold for add-on     Comment: Auto resulted       Basic Metabolic Panel [[id number]]  (Abnormal) Collected: 12/17/21 0711    Specimen: Blood Updated: 12/17/21 0809     Glucose 73 mg/dL      BUN 40 mg/dL      Creatinine 2.11 mg/dL      Sodium 141 mmol/L      Potassium 3.5 mmol/L      Chloride 105 mmol/L      CO2 25.0 mmol/L      Calcium 8.4 mg/dL      eGFR Non African Amer 31 mL/min/1.73      BUN/Creatinine Ratio 19.0     Anion Gap 11.0 mmol/L     Narrative:      GFR Normal >60  Chronic Kidney Disease <60  Kidney Failure <15      POC Glucose Once [[id number]]  (Normal) Collected: 12/17/21 0618    Specimen: Blood Updated: 12/17/21 0639     Glucose 73 mg/dL      Comment: : [id number] LYLA JASMINEMeter ID: [id number]       POC Glucose Once [[id number]]  (Abnormal) Collected: 12/17/21 0145    Specimen: Blood Updated: 12/17/21 0157     Glucose 149 mg/dL      Comment: Result Not ConfirmedOperator: [id number] LYLA JASMINEMeter ID: [id number]       POC Glucose Once [[id number]]  (Abnormal) Collected: 12/16/21 1936    Specimen: Blood Updated: 12/17/21 0009     Glucose 293 mg/dL      Comment: RN NotifiedOperator: [id number] MARYAM AMBERMeter ID: [id number]       POC Glucose Once [[id number]]  (Abnormal) Collected: 12/16/21 1834    Specimen: Blood Updated: 12/17/21 0009     Glucose 323 mg/dL      Comment: RN NotifiedOperator: [id number] YUMIKO RICHMONDINMeter ID: [id number]       POC Glucose Once [[id number]]  (Abnormal) Collected: 12/16/21 1959    Specimen: Blood Updated: 12/16/21 2043     Glucose 318 mg/dL      Comment: Result Not ConfirmedOperator: [id number] LYLA JASMINEMeter ID: [id number]       POC Glucose Once [[id number]]  (Abnormal) Collected: 12/16/21 1757    Specimen: Blood Updated: 12/16/21 1813     Glucose 363 mg/dL      Comment: RN NotifiedOperator: [id number] CARLOTTA Retana ID: [id number]       COVID-19 and FLU A/B PCR - Swab, Nasopharynx [[id number]]  (Normal) Collected: 12/16/21 3756     Specimen: Swab from Nasopharynx Updated: 12/16/21 1545     COVID19 Not Detected     Influenza A PCR Not Detected     Influenza B PCR Not Detected    Narrative:      Fact sheet for providers: https://www.fda.gov/media/385926/download    Fact sheet for patients: https://www.fda.gov/media/903654/download    Test performed by PCR.    Urinalysis, Microscopic Only - Urine, Clean Catch [[id number]]  (Abnormal) Collected: 12/16/21 1415    Specimen: Urine, Clean Catch Updated: 12/16/21 1437     RBC, UA 3-5 /HPF      WBC, UA 0-2 /HPF      Bacteria, UA 1+ /HPF      Squamous Epithelial Cells, UA 3-5 /HPF      Hyaline Casts, UA 3-6 /LPF      Granular Casts, UA 7-12 /LPF      Methodology Automated Microscopy    [place] Draw [[id number]] Collected: 12/16/21 1316    Specimen: Blood Updated: 12/16/21 1432    Narrative:      The following orders were created for panel order [place] Draw.  Procedure                               Abnormality         Status                     ---------                               -----------         ------                     Green Top (Gel)[[id number]]                                  Final result               Lavender Top[[id number]]                                     Final result               Gold Top - SST[[id number]]                                   Final result               Light Blue Top[[id number]]                                   Final result                 Please view results for these tests on the individual orders.    Lavender Top [[id number]] Collected: 12/16/21 1316    Specimen: Blood Updated: 12/16/21 1432     Extra Tube hold for add-on     Comment: Auto resulted       Light Blue Top [[id number]] Collected: 12/16/21 1316    Specimen: Blood Updated: 12/16/21 1432     Extra Tube hold for add-on     Comment: Auto resulted       Green Top (Gel) [[id number]] Collected: 12/16/21 1316    Specimen: Blood Updated: 12/16/21 1432     Extra Tube Hold for add-ons.     Comment: Auto resulted.        Gold Top - SST [[id number]] Collected: 12/16/21 1316    Specimen: Blood Updated: 12/16/21 1432     Extra Tube Hold for add-ons.     Comment: Auto resulted.       Urinalysis With Microscopic If Indicated (No Culture) - Urine, Clean Catch [[id number]]  (Abnormal) Collected: 12/16/21 1415    Specimen: Urine, Clean Catch Updated: 12/16/21 1423     Color, UA Yellow     Appearance, UA Clear     pH, UA 5.5     Specific Gravity, UA 1.020     Glucose,  mg/dL (2+)     Ketones, UA Negative     Bilirubin, UA Negative     Blood, UA Trace     Protein,  mg/dL (2+)     Leuk Esterase, UA Negative     Nitrite, UA Negative     Urobilinogen, UA 0.2 E.U./dL    Comprehensive Metabolic Panel [[id number]]  (Abnormal) Collected: 12/16/21 1316    Specimen: Blood Updated: 12/16/21 1357     Glucose 449 mg/dL      BUN 43 mg/dL      Creatinine 2.56 mg/dL      Sodium 131 mmol/L      Potassium 4.0 mmol/L      Chloride 95 mmol/L      CO2 21.0 mmol/L      Calcium 8.7 mg/dL      Total Protein 7.6 g/dL      Albumin 3.30 g/dL      ALT (SGPT) 9 U/L      AST (SGOT) 22 U/L      Alkaline Phosphatase 92 U/L      Total Bilirubin 0.3 mg/dL      eGFR Non African Amer 24 mL/min/1.73      Globulin 4.3 gm/dL      A/G Ratio 0.8 g/dL      BUN/Creatinine Ratio 16.8     Anion Gap 15.0 mmol/L     Narrative:      GFR Normal >60  Chronic Kidney Disease <60  Kidney Failure <15      Troponin [[id number]]  (Normal) Collected: 12/16/21 1316    Specimen: Blood Updated: 12/16/21 1352     Troponin T <0.010 ng/mL     Narrative:      Troponin T Reference Range:  <= 0.03 ng/mL-   Negative for AMI  >0.03 ng/mL-     Abnormal for myocardial necrosis.  Clinicians would have to utilize clinical acumen, EKG, Troponin and serial changes to determine if it is an Acute Myocardial Infarction or myocardial injury due to an underlying chronic condition.       Results may be falsely decreased if patient taking Biotin.      BNP [[id number]]  (Normal) Collected: 12/16/21 1316     Specimen: Blood Updated: 12/16/21 1351     proBNP 900.8 pg/mL     Narrative:      Among patients with dyspnea, NT-proBNP is highly sensitive for the detection of acute congestive heart failure. In addition NT-proBNP of <300 pg/ml effectively rules out acute congestive heart failure with 99% negative predictive value.    Results may be falsely decreased if patient taking Biotin.      CBC & Differential [[id number]]  (Abnormal) Collected: 12/16/21 1316    Specimen: Blood Updated: 12/16/21 1326    Narrative:      The following orders were created for panel order CBC & Differential.  Procedure                               Abnormality         Status                     ---------                               -----------         ------                     CBC Auto Differential[[id number]]        Abnormal            Final result                 Please view results for these tests on the individual orders.    CBC Auto Differential [[id number]]  (Abnormal) Collected: 12/16/21 1316    Specimen: Blood Updated: 12/16/21 1326     WBC 6.66 10*3/mm3      RBC 3.78 10*6/mm3      Hemoglobin 10.1 g/dL      Hematocrit 29.7 %      MCV 78.6 fL      MCH 26.7 pg      MCHC 34.0 g/dL      RDW 13.2 %      RDW-SD 37.4 fl      MPV 9.4 fL      Platelets 269 10*3/mm3      Neutrophil % 64.5 %      Lymphocyte % 20.9 %      Monocyte % 10.8 %      Eosinophil % 3.0 %      Basophil % 0.5 %      Immature Grans % 0.3 %      Neutrophils, Absolute 4.30 10*3/mm3      Lymphocytes, Absolute 1.39 10*3/mm3      Monocytes, Absolute 0.72 10*3/mm3      Eosinophils, Absolute 0.20 10*3/mm3      Basophils, Absolute 0.03 10*3/mm3      Immature Grans, Absolute 0.02 10*3/mm3      nRBC 0.0 /100 WBC         Imaging Results (All)     Procedure Component Value Units Date/Time    XR Chest 1 View [[id number]] Collected: 12/16/21 1528     Updated: 12/16/21 1600    Narrative:      EXAM: XR CHEST 1 VIEW    COMPARISONS: Radiograph 11/15/2021    INDICATION: sob    FINDINGS:  Frontal  "view of the chest.    Lungs are symmetric and adequately expanded. No focal  consolidation, effusion, or pneumothorax. Heart size is normal.  No acute osseous abnormalities. Sternotomy wires are noted.      Impression:      No acute cardiopulmonary process.    Electronically signed by:  Amy Deng MD  12/16/2021  3:58 PM CST Workstation: [id number]            Chief Complaint on Day of Discharge: none    Hospital Course:  76-year-old  male with past medical history of chronic kidney disease, type 2 diabetes, hypertension, CHF with reduced ejection fraction, gastroesophageal reflux disease, history of heart transplant who presented on 12/16/2021 with complaints of feeling dehydrated.  He was found to have dehydration with acute kidney injury with creatinine level of 2.5 up from baseline of 1.9.  He reports prior to admission he was taking Lasix 40 mg in morning and 20 mg in PM.  He has had several recent episodes of dehydration taking the meds this way according to daughter.  He was provided with IV hydration overnight while hospitalized and creatinine is trending downward to 2.1.  He has been instructed to continue Lasix at only 20 mg daily at this time and he has been ordered recheck of BMP in five days.  He will discharge home in stable condition with instructions for one week PCP follow up and five day nephrology follow up.       Condition on Discharge:  Stable     Physical Exam on Discharge:  /70 (BP Location: Left arm, Patient Position: Lying)   Pulse 85   Temp 97.7 °F (36.5 °C)   Resp 18   Ht 170.2 cm (67\")   Wt 89.6 kg (197 lb 9 oz)   SpO2 95%   BMI 30.94 kg/m²   Physical Exam  Vitals and nursing note reviewed.   Constitutional:       General: He is not in acute distress.     Appearance: Normal appearance.   HENT:      Head: Normocephalic and atraumatic.      Right Ear: External ear normal.      Left Ear: External ear normal.      Nose: Nose normal.      Mouth/Throat:      " Mouth: Mucous membranes are moist.      Pharynx: Oropharynx is clear.   Eyes:      General: No scleral icterus.        Right eye: No discharge.         Left eye: No discharge.      Conjunctiva/sclera: Conjunctivae normal.   Cardiovascular:      Rate and Rhythm: Normal rate and regular rhythm.      Pulses: Normal pulses.      Heart sounds: Normal heart sounds. No murmur heard.  No friction rub. No gallop.    Pulmonary:      Effort: Pulmonary effort is normal. No respiratory distress.      Breath sounds: Normal breath sounds. No stridor. No wheezing, rhonchi or rales.   Abdominal:      General: Bowel sounds are normal. There is no distension.      Palpations: Abdomen is soft.      Tenderness: There is no abdominal tenderness.   Musculoskeletal:         General: No swelling. Normal range of motion.      Cervical back: Normal range of motion and neck supple.   Skin:     General: Skin is warm and dry.   Neurological:      General: No focal deficit present.      Mental Status: He is alert and oriented to person, place, and time.   Psychiatric:         Mood and Affect: Mood normal.         Behavior: Behavior normal.           Discharge Disposition:  Home or Self Care    Discharge Medications:     Discharge Medications      Changes to Medications      Instructions Start Date   furosemide 40 MG tablet  Commonly known as: LASIX  What changed: how much to take   20 mg, Oral, Daily      POTASSIUM CHLORIDE PO  What changed: Another medication with the same name was removed. Continue taking this medication, and follow the directions you see here.   40 mEq, Oral, Nightly         Continue These Medications      Instructions Start Date   amLODIPine 10 MG tablet  Commonly known as: NORVASC   10 mg, Oral, Daily, At lunch      aspirin 81 MG chewable tablet  Notes to patient: 12/18/2021   81 mg, Oral, Daily      azaTHIOprine 50 MG tablet  Commonly known as: IMURAN   100 mg, Oral, Daily      bacitracin 500 UNIT/GM ointment  Notes to  patient: 12/17/2021   Topical, 2 Times Daily      calcitriol 0.25 MCG capsule  Commonly known as: ROCALTROL   0.25 mcg, Oral, Every Other Day      calcium-vitamin D 500-200 MG-UNIT per tablet  Commonly known as: OSCAL-500  Notes to patient: 12/18/2021   1 tablet, Oral, Daily      citalopram 20 MG tablet  Commonly known as: CeleXA  Notes to patient: 12/18/2021   20 mg, Oral, Daily      docusate sodium 100 MG capsule  Commonly known as: COLACE  Notes to patient: As needed   100 mg, Oral, As Needed      doxazosin 8 MG tablet  Commonly known as: CARDURA   8 mg, Oral, 2 Times Daily      ezetimibe 10 MG tablet  Commonly known as: ZETIA   10 mg, Oral, Nightly      fish oil 1000 MG capsule capsule   1 capsule, Oral, 2 Times Daily With Meals      gabapentin 100 MG capsule  Commonly known as: NEURONTIN  Notes to patient: 12/17/2021   200 mg, Oral, 3 Times Daily      Gengraf 100 MG capsule  Generic drug: cycloSPORINE modified   100 mg, Oral, 2 Times Daily      hydrALAZINE 10 MG tablet  Commonly known as: APRESOLINE   10 mg, Oral, 3 Times Daily      insulin aspart 100 UNIT/ML injection  Commonly known as: novoLOG   23 Units, Subcutaneous, 3 Times Daily      insulin glargine 100 UNIT/ML injection  Commonly known as: LANTUS, SEMGLEE   30 Units, Subcutaneous, 2 Times Daily      isosorbide dinitrate 20 MG tablet  Commonly known as: ISORDIL   20 mg, Oral, 3 Times Daily, 8:00 am, 1:00 pm' and 6:00 pm      metoprolol succinate XL 50 MG 24 hr tablet  Commonly known as: TOPROL-XL  Notes to patient: 12/18/2021   50 mg, Oral, Daily      multivitamin with minerals tablet tablet   1 tablet, Oral, Daily      rosuvastatin 5 MG tablet  Commonly known as: CRESTOR   5 mg, Oral, Nightly      Sennosides 15 MG chewable tablet   2 tablets, Oral, 2 Times Daily PRN             Discharge Diet:   Diet Instructions     Diet: Cardiac, Renal; Thin      Discharge Diet:  Cardiac  Renal       Fluid Consistency: Thin          Activity at Discharge:   Activity  Instructions     Activity as Tolerated            Discharge Care Plan/Instructions: Follow up with PCP in one week, nephrology in 5 days.  BMP recheck in 5 days.     Follow-up Appointments:   Additional Instructions for the Follow-ups that You Need to Schedule     Discharge Follow-up with PCP   As directed       Currently Documented PCP:    Cam Cortez MD    PCP Phone Number:    [phone]     Follow Up Details: one week         Discharge Follow-up with Specified Provider: nephrology; 5 Days   As directed      To: nephrology    Follow Up: 5 Days         Basic Metabolic Panel    Dec 22, 2021 (Approximate)      Release to patient: Immediate            Follow-up Information     Cam Cortez MD Follow up on 12/17/2021.    Specialty: Family Medicine  Contact information:  [address] [place] [id number] [phone]             Cam Cortez MD .    Specialty: Family Medicine  Why: one week  Contact information:  [address] [place] [address] [address] [place] [address]  [phone]                         Test Results Pending at Discharge:           This document has been electronically signed by ADOLPH Mcguire on December 17, 2021 11:19 CST        Time: 30 minutes spent on assessment, discussion, management, and discharge planning for this patient.

## 2021-12-17 NOTE — DISCHARGE INSTR - APPOINTMENTS
Hospital Follow up with PCP for 1 week this is your number to call below.     [facility]  [phone]    Also follow up with Nephrology of choice in 5 days

## 2022-02-10 ENCOUNTER — HOSPITAL ENCOUNTER (OUTPATIENT)
Facility: HOSPITAL | Age: 77
Setting detail: OBSERVATION
Discharge: HOME HEALTH | End: 2022-02-12
Attending: FAMILY MEDICINE | Admitting: INTERNAL MEDICINE

## 2022-02-10 ENCOUNTER — APPOINTMENT (OUTPATIENT)
Dept: MRI IMAGING | Facility: HOSPITAL | Age: 77
End: 2022-02-10

## 2022-02-10 ENCOUNTER — APPOINTMENT (OUTPATIENT)
Dept: CT IMAGING | Facility: HOSPITAL | Age: 77
End: 2022-02-10

## 2022-02-10 ENCOUNTER — APPOINTMENT (OUTPATIENT)
Dept: GENERAL RADIOLOGY | Facility: HOSPITAL | Age: 77
End: 2022-02-10

## 2022-02-10 DIAGNOSIS — I63.9 CEREBROVASCULAR ACCIDENT (CVA), UNSPECIFIED MECHANISM: ICD-10-CM

## 2022-02-10 DIAGNOSIS — Z94.9 TRANSPLANT: ICD-10-CM

## 2022-02-10 DIAGNOSIS — Z78.9 IMPAIRED MOBILITY AND ADLS: ICD-10-CM

## 2022-02-10 DIAGNOSIS — I63.9 ACUTE CVA (CEREBROVASCULAR ACCIDENT): ICD-10-CM

## 2022-02-10 DIAGNOSIS — Z74.09 IMPAIRED MOBILITY AND ADLS: ICD-10-CM

## 2022-02-10 DIAGNOSIS — I10 BENIGN HYPERTENSION: ICD-10-CM

## 2022-02-10 DIAGNOSIS — R20.0 NUMBNESS: Primary | ICD-10-CM

## 2022-02-10 DIAGNOSIS — Z74.09 IMPAIRED FUNCTIONAL MOBILITY, BALANCE, GAIT, AND ENDURANCE: ICD-10-CM

## 2022-02-10 DIAGNOSIS — R13.10 DYSPHAGIA, UNSPECIFIED TYPE: ICD-10-CM

## 2022-02-10 LAB
ABO GROUP BLD: NORMAL
ABO GROUP BLD: NORMAL
ALBUMIN SERPL-MCNC: 4.4 G/DL (ref 3.5–5.2)
ALBUMIN/GLOB SERPL: 1.4 G/DL
ALP SERPL-CCNC: 104 U/L (ref 39–117)
ALT SERPL W P-5'-P-CCNC: 17 U/L (ref 1–41)
ANION GAP SERPL CALCULATED.3IONS-SCNC: 10 MMOL/L (ref 5–15)
AST SERPL-CCNC: 19 U/L (ref 1–40)
BASOPHILS # BLD AUTO: 0.02 10*3/MM3 (ref 0–0.2)
BASOPHILS NFR BLD AUTO: 0.3 % (ref 0–1.5)
BILIRUB SERPL-MCNC: 0.3 MG/DL (ref 0–1.2)
BLD GP AB SCN SERPL QL: NEGATIVE
BUN SERPL-MCNC: 35 MG/DL (ref 8–23)
BUN/CREAT SERPL: 16.7 (ref 7–25)
CALCIUM SPEC-SCNC: 9.4 MG/DL (ref 8.6–10.5)
CHLORIDE SERPL-SCNC: 103 MMOL/L (ref 98–107)
CK SERPL-CCNC: 101 U/L (ref 20–200)
CO2 SERPL-SCNC: 26 MMOL/L (ref 22–29)
CREAT SERPL-MCNC: 2.1 MG/DL (ref 0.76–1.27)
DEPRECATED RDW RBC AUTO: 44.1 FL (ref 37–54)
EOSINOPHIL # BLD AUTO: 0.07 10*3/MM3 (ref 0–0.4)
EOSINOPHIL NFR BLD AUTO: 1 % (ref 0.3–6.2)
ERYTHROCYTE [DISTWIDTH] IN BLOOD BY AUTOMATED COUNT: 15 % (ref 12.3–15.4)
FLUAV SUBTYP SPEC NAA+PROBE: NOT DETECTED
FLUBV RNA ISLT QL NAA+PROBE: NOT DETECTED
GFR SERPL CREATININE-BSD FRML MDRD: 31 ML/MIN/1.73
GLOBULIN UR ELPH-MCNC: 3.2 GM/DL
GLUCOSE BLDC GLUCOMTR-MCNC: 221 MG/DL (ref 70–130)
GLUCOSE SERPL-MCNC: 198 MG/DL (ref 65–99)
HCT VFR BLD AUTO: 31.6 % (ref 37.5–51)
HGB BLD-MCNC: 10.5 G/DL (ref 13–17.7)
HOLD SPECIMEN: NORMAL
HOLD SPECIMEN: NORMAL
IMM GRANULOCYTES # BLD AUTO: 0.02 10*3/MM3 (ref 0–0.05)
IMM GRANULOCYTES NFR BLD AUTO: 0.3 % (ref 0–0.5)
INR PPP: 1.02 (ref 0.8–1.2)
LYMPHOCYTES # BLD AUTO: 2.28 10*3/MM3 (ref 0.7–3.1)
LYMPHOCYTES NFR BLD AUTO: 32.7 % (ref 19.6–45.3)
Lab: NORMAL
MAGNESIUM SERPL-MCNC: 1.7 MG/DL (ref 1.6–2.4)
MCH RBC QN AUTO: 26.5 PG (ref 26.6–33)
MCHC RBC AUTO-ENTMCNC: 33.2 G/DL (ref 31.5–35.7)
MCV RBC AUTO: 79.8 FL (ref 79–97)
MONOCYTES # BLD AUTO: 0.47 10*3/MM3 (ref 0.1–0.9)
MONOCYTES NFR BLD AUTO: 6.7 % (ref 5–12)
NEUTROPHILS NFR BLD AUTO: 4.12 10*3/MM3 (ref 1.7–7)
NEUTROPHILS NFR BLD AUTO: 59 % (ref 42.7–76)
NRBC BLD AUTO-RTO: 0 /100 WBC (ref 0–0.2)
PLATELET # BLD AUTO: 248 10*3/MM3 (ref 140–450)
PMV BLD AUTO: 9.3 FL (ref 6–12)
POTASSIUM SERPL-SCNC: 4.1 MMOL/L (ref 3.5–5.2)
PROT SERPL-MCNC: 7.6 G/DL (ref 6–8.5)
PROTHROMBIN TIME: 13.3 SECONDS (ref 11.1–15.3)
RBC # BLD AUTO: 3.96 10*6/MM3 (ref 4.14–5.8)
RH BLD: POSITIVE
RH BLD: POSITIVE
SARS-COV-2 RNA PNL SPEC NAA+PROBE: NOT DETECTED
SODIUM SERPL-SCNC: 139 MMOL/L (ref 136–145)
T&S EXPIRATION DATE: NORMAL
WBC NRBC COR # BLD: 6.98 10*3/MM3 (ref 3.4–10.8)
WHOLE BLOOD HOLD SPECIMEN: NORMAL
WHOLE BLOOD HOLD SPECIMEN: NORMAL

## 2022-02-10 PROCEDURE — 85610 PROTHROMBIN TIME: CPT | Performed by: FAMILY MEDICINE

## 2022-02-10 PROCEDURE — 70450 CT HEAD/BRAIN W/O DYE: CPT

## 2022-02-10 PROCEDURE — 87636 SARSCOV2 & INF A&B AMP PRB: CPT | Performed by: FAMILY MEDICINE

## 2022-02-10 PROCEDURE — 70544 MR ANGIOGRAPHY HEAD W/O DYE: CPT

## 2022-02-10 PROCEDURE — 86901 BLOOD TYPING SEROLOGIC RH(D): CPT

## 2022-02-10 PROCEDURE — 85025 COMPLETE CBC W/AUTO DIFF WBC: CPT | Performed by: FAMILY MEDICINE

## 2022-02-10 PROCEDURE — 86901 BLOOD TYPING SEROLOGIC RH(D): CPT | Performed by: FAMILY MEDICINE

## 2022-02-10 PROCEDURE — 83735 ASSAY OF MAGNESIUM: CPT | Performed by: FAMILY MEDICINE

## 2022-02-10 PROCEDURE — 70551 MRI BRAIN STEM W/O DYE: CPT

## 2022-02-10 PROCEDURE — 86900 BLOOD TYPING SEROLOGIC ABO: CPT | Performed by: FAMILY MEDICINE

## 2022-02-10 PROCEDURE — 86900 BLOOD TYPING SEROLOGIC ABO: CPT

## 2022-02-10 PROCEDURE — 63710000001 CYCLOSPORINE MODIFIED PER 25 MG: Performed by: INTERNAL MEDICINE

## 2022-02-10 PROCEDURE — 36415 COLL VENOUS BLD VENIPUNCTURE: CPT | Performed by: FAMILY MEDICINE

## 2022-02-10 PROCEDURE — 93005 ELECTROCARDIOGRAM TRACING: CPT | Performed by: FAMILY MEDICINE

## 2022-02-10 PROCEDURE — 71045 X-RAY EXAM CHEST 1 VIEW: CPT

## 2022-02-10 PROCEDURE — G0378 HOSPITAL OBSERVATION PER HR: HCPCS

## 2022-02-10 PROCEDURE — 93010 ELECTROCARDIOGRAM REPORT: CPT | Performed by: INTERNAL MEDICINE

## 2022-02-10 PROCEDURE — 80053 COMPREHEN METABOLIC PANEL: CPT | Performed by: FAMILY MEDICINE

## 2022-02-10 PROCEDURE — 70547 MR ANGIOGRAPHY NECK W/O DYE: CPT

## 2022-02-10 PROCEDURE — C9803 HOPD COVID-19 SPEC COLLECT: HCPCS

## 2022-02-10 PROCEDURE — 82550 ASSAY OF CK (CPK): CPT | Performed by: FAMILY MEDICINE

## 2022-02-10 PROCEDURE — 99285 EMERGENCY DEPT VISIT HI MDM: CPT

## 2022-02-10 PROCEDURE — 99214 OFFICE O/P EST MOD 30 MIN: CPT | Performed by: NURSE PRACTITIONER

## 2022-02-10 PROCEDURE — 63710000001 INSULIN DETEMIR PER 5 UNITS: Performed by: INTERNAL MEDICINE

## 2022-02-10 PROCEDURE — 86850 RBC ANTIBODY SCREEN: CPT | Performed by: FAMILY MEDICINE

## 2022-02-10 PROCEDURE — 82962 GLUCOSE BLOOD TEST: CPT

## 2022-02-10 RX ORDER — ATORVASTATIN CALCIUM 40 MG/1
80 TABLET, FILM COATED ORAL NIGHTLY
Status: DISCONTINUED | OUTPATIENT
Start: 2022-02-10 | End: 2022-02-11

## 2022-02-10 RX ORDER — TERAZOSIN 5 MG/1
5 CAPSULE ORAL EVERY 12 HOURS SCHEDULED
Status: DISCONTINUED | OUTPATIENT
Start: 2022-02-10 | End: 2022-02-12 | Stop reason: HOSPADM

## 2022-02-10 RX ORDER — ACETAMINOPHEN 325 MG/1
650 TABLET ORAL EVERY 4 HOURS PRN
Status: DISCONTINUED | OUTPATIENT
Start: 2022-02-10 | End: 2022-02-12 | Stop reason: HOSPADM

## 2022-02-10 RX ORDER — DOCUSATE SODIUM 100 MG/1
100 CAPSULE, LIQUID FILLED ORAL AS NEEDED
Status: DISCONTINUED | OUTPATIENT
Start: 2022-02-10 | End: 2022-02-10

## 2022-02-10 RX ORDER — ASPIRIN 325 MG
325 TABLET ORAL DAILY
Status: DISCONTINUED | OUTPATIENT
Start: 2022-02-10 | End: 2022-02-11

## 2022-02-10 RX ORDER — AZATHIOPRINE 50 MG/1
100 TABLET ORAL DAILY
Status: DISCONTINUED | OUTPATIENT
Start: 2022-02-11 | End: 2022-02-12 | Stop reason: HOSPADM

## 2022-02-10 RX ORDER — NICOTINE POLACRILEX 4 MG
15 LOZENGE BUCCAL
Status: DISCONTINUED | OUTPATIENT
Start: 2022-02-10 | End: 2022-02-12 | Stop reason: HOSPADM

## 2022-02-10 RX ORDER — FUROSEMIDE 40 MG/1
40 TABLET ORAL DAILY
Status: DISCONTINUED | OUTPATIENT
Start: 2022-02-11 | End: 2022-02-12 | Stop reason: HOSPADM

## 2022-02-10 RX ORDER — SENNA PLUS 8.6 MG/1
1 TABLET ORAL 2 TIMES DAILY PRN
Status: DISCONTINUED | OUTPATIENT
Start: 2022-02-10 | End: 2022-02-12 | Stop reason: HOSPADM

## 2022-02-10 RX ORDER — SODIUM CHLORIDE 0.9 % (FLUSH) 0.9 %
10 SYRINGE (ML) INJECTION EVERY 12 HOURS SCHEDULED
Status: DISCONTINUED | OUTPATIENT
Start: 2022-02-10 | End: 2022-02-12 | Stop reason: HOSPADM

## 2022-02-10 RX ORDER — METOPROLOL SUCCINATE 50 MG/1
100 TABLET, EXTENDED RELEASE ORAL DAILY
Status: DISCONTINUED | OUTPATIENT
Start: 2022-02-11 | End: 2022-02-12 | Stop reason: HOSPADM

## 2022-02-10 RX ORDER — SODIUM CHLORIDE 0.9 % (FLUSH) 0.9 %
10 SYRINGE (ML) INJECTION AS NEEDED
Status: DISCONTINUED | OUTPATIENT
Start: 2022-02-10 | End: 2022-02-12 | Stop reason: HOSPADM

## 2022-02-10 RX ORDER — CALCITRIOL 0.25 UG/1
0.25 CAPSULE, LIQUID FILLED ORAL EVERY OTHER DAY
Status: DISCONTINUED | OUTPATIENT
Start: 2022-02-12 | End: 2022-02-12 | Stop reason: HOSPADM

## 2022-02-10 RX ORDER — ASPIRIN 300 MG/1
300 SUPPOSITORY RECTAL DAILY
Status: DISCONTINUED | OUTPATIENT
Start: 2022-02-10 | End: 2022-02-11

## 2022-02-10 RX ORDER — DEXTROSE MONOHYDRATE 25 G/50ML
25 INJECTION, SOLUTION INTRAVENOUS
Status: DISCONTINUED | OUTPATIENT
Start: 2022-02-10 | End: 2022-02-12 | Stop reason: HOSPADM

## 2022-02-10 RX ORDER — ISOSORBIDE DINITRATE 20 MG/1
20 TABLET ORAL
Status: DISCONTINUED | OUTPATIENT
Start: 2022-02-10 | End: 2022-02-12 | Stop reason: HOSPADM

## 2022-02-10 RX ORDER — ONDANSETRON 2 MG/ML
4 INJECTION INTRAMUSCULAR; INTRAVENOUS EVERY 6 HOURS PRN
Status: DISCONTINUED | OUTPATIENT
Start: 2022-02-10 | End: 2022-02-12 | Stop reason: HOSPADM

## 2022-02-10 RX ORDER — MORPHINE SULFATE 2 MG/ML
1 INJECTION, SOLUTION INTRAMUSCULAR; INTRAVENOUS EVERY 4 HOURS PRN
Status: DISCONTINUED | OUTPATIENT
Start: 2022-02-10 | End: 2022-02-12 | Stop reason: HOSPADM

## 2022-02-10 RX ORDER — POTASSIUM CHLORIDE 1.5 G/1.77G
40 POWDER, FOR SOLUTION ORAL NIGHTLY
Status: DISCONTINUED | OUTPATIENT
Start: 2022-02-10 | End: 2022-02-12 | Stop reason: HOSPADM

## 2022-02-10 RX ORDER — GABAPENTIN 100 MG/1
200 CAPSULE ORAL 3 TIMES DAILY
Status: DISCONTINUED | OUTPATIENT
Start: 2022-02-10 | End: 2022-02-12 | Stop reason: HOSPADM

## 2022-02-10 RX ORDER — MULTIPLE VITAMINS W/ MINERALS TAB 9MG-400MCG
1 TAB ORAL DAILY
Status: DISCONTINUED | OUTPATIENT
Start: 2022-02-11 | End: 2022-02-12 | Stop reason: HOSPADM

## 2022-02-10 RX ORDER — NALOXONE HCL 0.4 MG/ML
0.4 VIAL (ML) INJECTION
Status: DISCONTINUED | OUTPATIENT
Start: 2022-02-10 | End: 2022-02-12 | Stop reason: HOSPADM

## 2022-02-10 RX ORDER — CYCLOSPORINE 25 MG/1
75 CAPSULE, LIQUID FILLED ORAL 2 TIMES DAILY
Status: DISCONTINUED | OUTPATIENT
Start: 2022-02-10 | End: 2022-02-12 | Stop reason: HOSPADM

## 2022-02-10 RX ADMIN — POTASSIUM CHLORIDE 40 MEQ: 1.5 POWDER, FOR SOLUTION ORAL at 21:41

## 2022-02-10 RX ADMIN — ATORVASTATIN CALCIUM 80 MG: 40 TABLET, FILM COATED ORAL at 21:41

## 2022-02-10 RX ADMIN — SODIUM CHLORIDE, PRESERVATIVE FREE 10 ML: 5 INJECTION INTRAVENOUS at 23:28

## 2022-02-10 RX ADMIN — GABAPENTIN 200 MG: 100 CAPSULE ORAL at 21:41

## 2022-02-10 RX ADMIN — ISOSORBIDE DINITRATE 20 MG: 20 TABLET ORAL at 21:41

## 2022-02-10 RX ADMIN — TERAZOSIN HYDROCHLORIDE 5 MG: 5 CAPSULE ORAL at 21:41

## 2022-02-10 RX ADMIN — ASPIRIN 325 MG: 325 TABLET ORAL at 16:19

## 2022-02-10 RX ADMIN — CYCLOSPORINE 75 MG: 25 CAPSULE, LIQUID FILLED ORAL at 21:43

## 2022-02-10 RX ADMIN — INSULIN DETEMIR 20 UNITS: 100 INJECTION, SOLUTION SUBCUTANEOUS at 23:27

## 2022-02-10 NOTE — ED NOTES
Pt arrives via [facility] with complaints of right sided arm and leg tingling and numbness, along with a headache.  Pt states this began about 2-3 days ago, but that he feels like they got worse early this am.  CT called and pt transported to CT after triage.      Venus Paredes, RN  02/10/22 [id number]

## 2022-02-10 NOTE — ED PROVIDER NOTES
Subjective   Patient presents to the emergency department with numbness in his right hand and right foot that has been present for the past 3 days.  He denies a past history of stroke.  He is a former smoker who has a history of hypertension, hypercholesterolemia, and diabetes.      Stroke  Presenting symptoms: sensory loss    Presenting symptoms: no confusion, no headaches and no weakness    Last known well:  3-4 days ago  Timing:  Constant  Progression:  Unchanged  Similar to previous episodes: no    Associated symptoms: no chest pain, no difficulty swallowing, no dizziness, no fever, no nausea, no neck pain, no seizures and no vomiting        Review of Systems   Constitutional: Negative for appetite change, chills, diaphoresis, fatigue and fever.   HENT: Negative for congestion, ear discharge, ear pain, nosebleeds, rhinorrhea, sinus pressure, sore throat and trouble swallowing.    Eyes: Negative for discharge and redness.   Respiratory: Negative for apnea, cough, chest tightness, shortness of breath and wheezing.    Cardiovascular: Negative for chest pain.   Gastrointestinal: Negative for abdominal pain, diarrhea, nausea and vomiting.   Endocrine: Negative for polyuria.   Genitourinary: Negative for dysuria, frequency and urgency.   Musculoskeletal: Negative for myalgias and neck pain.   Skin: Negative for color change and rash.   Allergic/Immunologic: Negative for immunocompromised state.   Neurological: Positive for numbness. Negative for dizziness, seizures, syncope, weakness, light-headedness and headaches.   Hematological: Negative for adenopathy. Does not bruise/bleed easily.   Psychiatric/Behavioral: Negative for behavioral problems and confusion.   All other systems reviewed and are negative.      Past Medical History:   Diagnosis Date   • Chronic kidney disease    • Diabetes mellitus ([facility])    • GERD (gastroesophageal reflux disease)    • HFrEF (heart failure with reduced ejection fraction) ([facility])    •  Hypertension        Allergies   Allergen Reactions   • Levofloxacin Anaphylaxis       Past Surgical History:   Procedure Laterality Date   • HEART TRANSPLANT     • HERNIA REPAIR         Family History   Problem Relation Age of Onset   • Heart disease Mother    • Heart disease Father    • Heart disease Brother    • Diabetes Brother        Social History     Socioeconomic History   • Marital status:    Tobacco Use   • Smoking status: Former Smoker   • Smokeless tobacco: Former User   Substance and Sexual Activity   • Alcohol use: Never   • Drug use: Never           Objective   Physical Exam  Vitals and nursing note reviewed.   Constitutional:       Appearance: He is well-developed.   HENT:      Head: Normocephalic and atraumatic.      Nose: Nose normal.   Eyes:      General: No scleral icterus.        Right eye: No discharge.         Left eye: No discharge.      Conjunctiva/sclera: Conjunctivae normal.      Pupils: Pupils are equal, round, and reactive to light.   Neck:      Trachea: No tracheal deviation.   Cardiovascular:      Rate and Rhythm: Normal rate and regular rhythm.      Heart sounds: Normal heart sounds. No murmur heard.      Pulmonary:      Effort: Pulmonary effort is normal. No respiratory distress.      Breath sounds: Normal breath sounds. No stridor. No wheezing or rales.   Abdominal:      General: Bowel sounds are normal. There is no distension.      Palpations: Abdomen is soft. There is no mass.      Tenderness: There is no abdominal tenderness. There is no guarding or rebound.   Musculoskeletal:      Cervical back: Normal range of motion and neck supple.   Skin:     General: Skin is warm and dry.      Findings: No erythema or rash.   Neurological:      Mental Status: He is alert and oriented to person, place, and time.      Coordination: Coordination normal.   Psychiatric:         Behavior: Behavior normal.         Thought Content: Thought content normal.         ECG 12 Lead      Date/Time:  2/10/2022 5:01 PM  Performed by: Sebastian Carter MD  Authorized by: Sebastian Carter MD   Interpreted by physician  Rhythm: sinus rhythm  BPM: 92  ST Segments: ST segments normal                   ED Course                   Labs Reviewed   COMPREHENSIVE METABOLIC PANEL - Abnormal; Notable for the following components:       Result Value    Glucose 198 (*)     BUN 35 (*)     Creatinine 2.10 (*)     eGFR Non  Amer 31 (*)     All other components within normal limits    Narrative:     GFR Normal >60  Chronic Kidney Disease <60  Kidney Failure <15     CBC WITH AUTO DIFFERENTIAL - Abnormal; Notable for the following components:    RBC 3.96 (*)     Hemoglobin 10.5 (*)     Hematocrit 31.6 (*)     MCH 26.5 (*)     All other components within normal limits   COVID-19 AND FLU A/B, NP SWAB IN TRANSPORT MEDIA 8-12 HR TAT - Normal    Narrative:     Fact sheet for providers: https://www.fda.gov/media/212528/download    Fact sheet for patients: https://www.fda.gov/media/050212/download    Test performed by PCR.   PROTIME-INR - Normal    Narrative:     Therapeutic range for most indications is 2.0-3.0 INR,  or 2.5-3.5 for mechanical heart valves.   CK - Normal   MAGNESIUM - Normal   RAINBOW DRAW    Narrative:     The following orders were created for panel order [place] Draw.  Procedure                               Abnormality         Status                     ---------                               -----------         ------                     Green Top (Gel)[[id number]]                                  Final result               Lavender Top[[id number]]                                     Final result               Gold Top - SST[[id number]]                                   Final result               Light Blue Top[[id number]]                                   Final result                 Please view results for these tests on the individual orders.   POCT GLUCOSE FINGERSTICK   POCT GLUCOSE FINGERSTICK   POCT  GLUCOSE FINGERSTICK   POCT GLUCOSE FINGERSTICK   POCT GLUCOSE FINGERSTICK   TYPE AND SCREEN   PREVIOUS HISTORY   [facility] 2ND SPECIMEN VERIFICATION   CBC AND DIFFERENTIAL    Narrative:     The following orders were created for panel order CBC & Differential.  Procedure                               Abnormality         Status                     ---------                               -----------         ------                     CBC Auto Differential[[id number]]        Abnormal            Final result                 Please view results for these tests on the individual orders.   GREEN TOP   LAVENDER TOP   GOLD TOP - SST   LIGHT BLUE TOP       XR Chest 1 View   Final Result      No acute cardiopulmonary disease.         Electronically signed by:  Fernandez Garcia MD  2/10/2022 3:11 PM CST   Workstation: [id number]      CT Head Without Contrast Stroke Protocol   Final Result      No acute intracranial hemorrhage. Stable findings.       Electronically signed by:  Fernandez Garcia MD  2/10/2022 3:00 PM CST   Workstation: [id number]      MRI Brain Without Contrast    (Results Pending)   MRI Angiogram Head Without Contrast    (Results Pending)   MRI Angiogram Neck Without Contrast    (Results Pending)                                          MDM    Final diagnoses:   Numbness       ED Disposition  ED Disposition     ED Disposition Condition Comment    Decision to Admit  Level of Care: Telemetry [5]   Diagnosis: Numbness [[id number]]   Admitting Physician: TUSHAR BHAKTA [[id number]]   Attending Physician: TUSHAR BHAKTA [[id number]]            No follow-up provider specified.       Medication List      No changes were made to your prescriptions during this visit.          Sebastian Carter MD  02/10/22 [id number]       Sebastian Carter MD  02/10/22 1245

## 2022-02-10 NOTE — CONSULTS
Stroke Consult Note    Patient Name: Román Corey   MRN: [id number]  Age: 76 y.o.  Sex: male  : 1945    Primary Care Physician: Cam Cortez MD  Referring Physician:  Sebastian Carter MD    TIME STROKE TEAM CALLED: 15:30 CST     TIME PATIENT SEEN: 15 37 CST    Handedness: Right  Race: White    Chief Complaint/Reason for Consultation: Right-sided numbness    HPI: Pt is a 76-yr-old right-handed white male with known diagnosis of HF s/p heart transplant in , hypertension, DM2, and SHAQ who presented after four days of numbness/tingling to right arm and leg. Pt stated numbness started in his right foot and calf and then a couple days later was in his arm and hand. Upon exam he is alert and oriented X4, strengths are equal, numbness/tingling to right arm and leg, and visual field is intact.     Last Known Normal Date/Time: 22    Review of Systems   Eyes: Negative.    Respiratory: Negative.    Cardiovascular: Negative.    Gastrointestinal: Negative.    Genitourinary: Negative.    Musculoskeletal: Negative.    Skin: Negative.    Neurological: Positive for numbness.        Temp:  [98 °F (36.7 °C)] 98 °F (36.7 °C)  Heart Rate:  [] 91  Resp:  [17-19] 17  BP: (163-177)/(87-90) 177/90    Neurological Exam  Mental Status  Awake, alert and oriented to person, place and time. Speech is normal. Language is fluent with no aphasia.    Cranial Nerves  CN II: Visual acuity is normal. Visual fields full to confrontation.  CN III, IV, VI: Extraocular movements intact bilaterally. Normal lids and orbits bilaterally. Pupils equal round and reactive to light bilaterally.  CN V: Facial sensation is normal.  CN VII: Full and symmetric facial movement.  CN IX, X: Palate elevates symmetrically. Normal gag reflex.  CN XI: Shoulder shrug strength is normal.  CN XII: Tongue midline without atrophy or fasciculations.    Motor  Normal muscle bulk throughout. No fasciculations present. Strength is 5/5 throughout  all four extremities.    Sensory  Light touch abnormality: Right arm and leg.     Reflexes  Not assessed.    Coordination  Finger-to-nose, rapid alternating movements and heel-to-shin normal bilaterally without dysmetria.    Gait  Not assessed.      Physical Exam  Eyes:      General: Lids are normal.      Extraocular Movements: Extraocular movements intact.      Pupils: Pupils are equal, round, and reactive to light.   Neurological:      Coordination: Coordination is intact.      Deep Tendon Reflexes: Strength normal.   Psychiatric:         Speech: Speech normal.         Acute Stroke Data    Alteplase (tPA) Inclusion / Exclusion Criteria    Time: 15:46 CST  Person Administering Scale: ADOLPH Wolf    Inclusion Criteria  [x]   18 years of age or greater   []   Onset of symptoms < 4.5 hours before beginning treatment (stroke onset = time patient was last seen well or without symptoms).   []   Diagnosis of acute ischemic stroke causing measurable disabling deficit (Complete Hemianopia, Any Aphasia, Visual or Sensory Extinction, Any weakness limiting sustained effort against gravity)   []   Any remaining deficit considered potentially disabling in view of patient and practitioner   Exclusion criteria (Do not proceed with Alteplase if any are checked under exclusion criteria)  [x]   Onset unknown or GREATER than 4.5 hours   []   ICH on CT/MRI   []   CT demonstrates hypodensity representing acute or subacute infarct   []   Significant head trauma or prior stroke in the previous 3 months   []   Symptoms suggestive of subarachnoid hemorrhage   []   History of un-ruptured intracranial aneurysm GREATER than 10 mm   []   Recent intracranial or intraspinal surgery within the last 3 months   []   Arterial puncture at a non-compressible site in the previous 7 days   []   Active internal bleeding   []   Acute bleeding tendency   []   Platelet count LESS than 100,000 for known hematological diseases such as leukemia,  thrombocytopenia or chronic cirrhosis   []   Current use of anticoagulant with INR GREATER than 1.7 or PT GREATER than 15 seconds, aPTT GREATER than 40 seconds   []   Heparin received within 48 hours, resulting in abnormally elevated aPTT GREATER than upper limit of normal   []   Current use of direct thrombin inhibitors or direct factor Xa inhibitors in the past 48 hours   []   Elevated blood pressure refractory to treatment (systolic GREATER than 185 mm/Hg or diastolic  GREATER than 110 mm/Hg   []   Suspected infective endocarditis and aortic arch dissection   []   Current use of therapeutic treatment dose of low-molecular-weight heparin (LMWH) within the previous 24 hours   []   Structural GI malignancy or bleed   Relative exclusion for all patients  []   Only minor non-disabling symptoms   []   Pregnancy   []   Seizure at onset with postictal residual neurological impairments   []   Major surgery or previous trauma within past 14 days   []   History of previous spontaneous ICH, intracranial neoplasm, or AV malformation   []   Postpartum (within previous 14 days)   []   Recent GI or urinary tract hemorrhage (within previous 21 days)   []   Recent acute MI (within previous 3 months)   []   History of un-ruptured intracranial aneurysm LESS than 10 mm   []   History of ruptured intracranial aneurysm   []   Blood glucose LESS than 50 mg/dL (2.7 mmol/L)   []   Dural puncture within the last 7 days   []   Known GREATER than 10 cerebral microbleeds   Additional exclusions for patients with symptoms onset between 3 and 4.5 hours.  []   Age > 80.   []   On any anticoagulants regardless of INR  >>> Warfarin (Coumadin), Heparin, Enoxaparin (Lovenox), fondaparinux (Arixtra), bivalirudin (Angiomax), Argatroban, dabigatran (Pradaxa), rivaroxaban (Xarelto), or apixaban (Eliquis)   []   Severe stroke (NIHSS > 25).   []   History of BOTH diabetes and previous ischemic stroke.   []   The risks and benefits have been discussed with  the patient or family related to the administration of IV Alteplase for stroke symptoms.   []   I have discussed and reviewed the patient's case and imaging with the attending prior to IV Alteplase.   N/A Time Alteplase administered       Past Medical History:   Diagnosis Date   • Chronic kidney disease    • Diabetes mellitus (HCC)    • GERD (gastroesophageal reflux disease)    • HFrEF (heart failure with reduced ejection fraction) (HCC)    • Hypertension      Past Surgical History:   Procedure Laterality Date   • HEART TRANSPLANT     • HERNIA REPAIR       Family History   Problem Relation Age of Onset   • Heart disease Mother    • Heart disease Father    • Heart disease Brother    • Diabetes Brother      Social History     Socioeconomic History   • Marital status:    Tobacco Use   • Smoking status: Former Smoker   • Smokeless tobacco: Former User   Substance and Sexual Activity   • Alcohol use: Never   • Drug use: Never     Allergies   Allergen Reactions   • Levofloxacin Anaphylaxis     Prior to Admission medications    Medication Sig Start Date End Date Taking? Authorizing Provider   amLODIPine (NORVASC) 10 MG tablet Take 10 mg by mouth Daily. At lunch    Shabana Khan MD   aspirin 81 MG chewable tablet Chew 81 mg Daily. 10/5/18   Shabana Khan MD   azaTHIOprine (IMURAN) 50 MG tablet Take 100 mg by mouth Daily.    Shabana Khan MD   bacitracin 500 UNIT/GM ointment Apply  topically to the appropriate area as directed 2 (Two) Times a Day. 10/1/19   Kim Dobbs MD   calcitriol (ROCALTROL) 0.25 MCG capsule Take 0.25 mcg by mouth Every Other Day.    Shabana Khan MD   calcium-vitamin D (OSCAL-500) 500-200 MG-UNIT per tablet Take 1 tablet by mouth Daily.    Shabana Khan MD   citalopram (CeleXA) 20 MG tablet Take 20 mg by mouth Daily. 10/4/18   Shabana Khan MD   cycloSPORINE modified (GENGRAF) 100 MG capsule Take 100 mg by mouth 2 (Two) Times a Day.     Shabana Khan MD   docusate sodium (COLACE) 100 MG capsule Take 100 mg by mouth As Needed for Constipation.    Shabana Khan MD   doxazosin (CARDURA) 8 MG tablet Take 8 mg by mouth 2 (Two) Times a Day.    Shabana Khan MD   ezetimibe (ZETIA) 10 MG tablet Take 10 mg by mouth Every Night. 10/4/18   Shabana Khan MD   furosemide (LASIX) 40 MG tablet Take 0.5 tablets by mouth Daily. 12/17/21   Rivka Knox APRN   gabapentin (NEURONTIN) 100 MG capsule Take 200 mg by mouth 3 (Three) Times a Day. 10/4/18   Shabana Khan MD   hydrALAZINE (APRESOLINE) 10 MG tablet Take 10 mg by mouth 3 (Three) Times a Day.    Shabana Khan MD   insulin aspart (novoLOG) 100 UNIT/ML injection Inject 23 Units under the skin into the appropriate area as directed 3 (Three) Times a Day.    Shabana Khan MD   insulin glargine (LANTUS) 100 UNIT/ML injection Inject 30 Units under the skin into the appropriate area as directed 2 (Two) Times a Day. 10/4/18   Shabana Khan MD   isosorbide dinitrate (ISORDIL) 20 MG tablet Take 20 mg by mouth 3 (Three) Times a Day. 8:00 am, 1:00 pm' and 6:00 pm 10/4/18   Shabana Khan MD   metoprolol succinate XL (TOPROL-XL) 50 MG 24 hr tablet Take 50 mg by mouth Daily. 10/5/18   Shabana Khan MD   multivitamin with minerals tablet tablet Take 1 tablet by mouth Daily.    Shabana Khan MD   Omega-3 Fatty Acids (FISH OIL) 1000 MG capsule capsule Take 1 capsule by mouth 2 (Two) Times a Day With Meals. 10/4/18   Shabana Khan MD   POTASSIUM CHLORIDE PO Take 40 mEq by mouth Every Night.    Shabana Khan MD   rosuvastatin (CRESTOR) 5 MG tablet Take 5 mg by mouth Every Night.    Shabana Khan MD   Sennosides 15 MG chewable tablet Chew 2 tablets 2 (Two) Times a Day As Needed.    Shabana Khan MD       Hospital Meds:  Scheduled-    Infusions-     PRNs- •  sodium chloride    Functional Status Prior to Current  Stroke/[place] Score: 1    NIH Stroke Scale  Time: 15:46 CST  Person Administering Scale: ADOLPH Wolf    1a  Level of consciousness: 0=alert; keenly responsive   1b. LOC questions:  0=Performs both tasks correctly   1c. LOC commands: 0=Performs both tasks correctly   2.  Best Gaze: 0=normal   3.  Visual: 0=No visual loss   4. Facial Palsy: 0=Normal symmetric movement   5a.  Motor left arm: 0=No drift, limb holds 90 (or 45) degrees for full 10 seconds   5b.  Motor right arm: 0=No drift, limb holds 90 (or 45) degrees for full 10 seconds   6a. motor left le=No drift, limb holds 90 (or 45) degrees for full 10 seconds   6b  Motor right le=No drift, limb holds 90 (or 45) degrees for full 10 seconds   7. Limb Ataxia: 0=Absent   8.  Sensory: 1=Mild to moderate sensory loss; patient feels pinprick is less sharp or is dull on the affected side; there is a loss of superficial pain with pinprick but patient is aware He is being touched   9. Best Language:  0=No aphasia, normal   10. Dysarthria: 0=Normal   11. Extinction and Inattention: 0=No abnormality    Total:   1       Results Reviewed:  I have personally reviewed current lab, radiology, and data and agree with results.  Lab Results (last 24 hours)     Procedure Component Value Units Date/Time    [place] Draw [[id number]] Collected: 02/10/22 1436    Specimen: Blood Updated: 02/10/22 1545    Narrative:      The following orders were created for panel order [place] Draw.  Procedure                               Abnormality         Status                     ---------                               -----------         ------                     Green Top (Gel)[[id number]]                                  Final result               Lavender Top[[id number]]                                     Final result               Gold Top - SST[[id number]]                                   Final result               Light Blue Top[[id number]]                                    Final result                 Please view results for these tests on the individual orders.    Green Top (Gel) [[id number]] Collected: 02/10/22 1436    Specimen: Blood Updated: 02/10/22 1545     Extra Tube Hold for add-ons.     Comment: Auto resulted.       Lavender Top [[id number]] Collected: 02/10/22 1436    Specimen: Blood Updated: 02/10/22 1545     Extra Tube hold for add-on     Comment: Auto resulted       Gold Top - SST [[id number]] Collected: 02/10/22 1436    Specimen: Blood Updated: 02/10/22 1545     Extra Tube Hold for add-ons.     Comment: Auto resulted.       Light Blue Top [[id number]] Collected: 02/10/22 1436    Specimen: Blood Updated: 02/10/22 1545     Extra Tube hold for add-on     Comment: Auto resulted       COVID-19 and FLU A/B PCR - Swab, Nasopharynx [[id number]]  (Normal) Collected: 02/10/22 1517    Specimen: Swab from Nasopharynx Updated: 02/10/22 1541     COVID19 Not Detected     Influenza A PCR Not Detected     Influenza B PCR Not Detected    Narrative:      Fact sheet for providers: https://www.fda.gov/media/574447/download    Fact sheet for patients: https://www.fda.gov/media/850168/download    Test performed by PCR.    Protime-INR [[id number]]  (Normal) Collected: 02/10/22 1436    Specimen: Blood Updated: 02/10/22 1522     Protime 13.3 Seconds      INR 1.02    Narrative:      Therapeutic range for most indications is 2.0-3.0 INR,  or 2.5-3.5 for mechanical heart valves.    CK [[id number]]  (Normal) Collected: 02/10/22 1436    Specimen: Blood Updated: 02/10/22 1521     Creatine Kinase 101 U/L     Magnesium [[id number]]  (Normal) Collected: 02/10/22 1436    Specimen: Blood Updated: 02/10/22 1521     Magnesium 1.7 mg/dL     Comprehensive Metabolic Panel [[id number]]  (Abnormal) Collected: 02/10/22 1436    Specimen: Blood Updated: 02/10/22 1509     Glucose 198 mg/dL      BUN 35 mg/dL      Creatinine 2.10 mg/dL      Sodium 139 mmol/L      Potassium 4.1 mmol/L      Chloride 103 mmol/L      CO2 26.0 mmol/L       Calcium 9.4 mg/dL      Total Protein 7.6 g/dL      Albumin 4.40 g/dL      ALT (SGPT) 17 U/L      AST (SGOT) 19 U/L      Alkaline Phosphatase 104 U/L      Total Bilirubin 0.3 mg/dL      eGFR Non African Amer 31 mL/min/1.73      Globulin 3.2 gm/dL      A/G Ratio 1.4 g/dL      BUN/Creatinine Ratio 16.7     Anion Gap 10.0 mmol/L     Narrative:      GFR Normal >60  Chronic Kidney Disease <60  Kidney Failure <15      CBC & Differential [[id number]]  (Abnormal) Collected: 02/10/22 1436    Specimen: Blood Updated: 02/10/22 1448    Narrative:      The following orders were created for panel order CBC & Differential.  Procedure                               Abnormality         Status                     ---------                               -----------         ------                     CBC Auto Differential[[id number]]        Abnormal            Final result                 Please view results for these tests on the individual orders.    CBC Auto Differential [[id number]]  (Abnormal) Collected: 02/10/22 1436    Specimen: Blood Updated: 02/10/22 1448     WBC 6.98 10*3/mm3      RBC 3.96 10*6/mm3      Hemoglobin 10.5 g/dL      Hematocrit 31.6 %      MCV 79.8 fL      MCH 26.5 pg      MCHC 33.2 g/dL      RDW 15.0 %      RDW-SD 44.1 fl      MPV 9.3 fL      Platelets 248 10*3/mm3      Neutrophil % 59.0 %      Lymphocyte % 32.7 %      Monocyte % 6.7 %      Eosinophil % 1.0 %      Basophil % 0.3 %      Immature Grans % 0.3 %      Neutrophils, Absolute 4.12 10*3/mm3      Lymphocytes, Absolute 2.28 10*3/mm3      Monocytes, Absolute 0.47 10*3/mm3      Eosinophils, Absolute 0.07 10*3/mm3      Basophils, Absolute 0.02 10*3/mm3      Immature Grans, Absolute 0.02 10*3/mm3      nRBC 0.0 /100 WBC         Imaging Results (Last 24 Hours)     Procedure Component Value Units Date/Time    XR Chest 1 View [[id number]] Collected: 02/10/22 1458     Updated: 02/10/22 1513    Narrative:      XR CHEST 1 VIEW     CLINICAL STATEMENT: Stroke Protocol  (Onset > 12 hrs)    COMPARISON:  12/16/2021    FINDINGS: Status post median sternotomy. The heart is mildly  enlarged. There is no focal lung consolidation or pleural  effusion. No evidence of pulmonary edema or pneumothorax.      Impression:        No acute cardiopulmonary disease.      Electronically signed by:  Fernandez Garcia MD  2/10/2022 3:11 PM CST  Workstation: [id number]    CT Head Without Contrast Stroke Protocol [[id number]] Collected: 02/10/22 1437     Updated: 02/10/22 1503    Narrative:      CT HEAD WITHOUT IV CONTRAST     CLINICAL STATEMENT: Stroke, follow up    COMPARISON:  CT head dated 11/15/2021.    This exam was performed according to our departmental  dose-optimization program which includes automated exposure  control, adjustment of the mA and/or kVp according to patient  size and/or use of iterative reconstruction technique where  applicable.    Findings: No acute intracranial hemorrhage, mass effect or  midline shift. Ventricles and subarachnoid spaces are moderately  dilated consistent with cerebral atrophy. Visualized paranasal  sinuses and the mastoid air cells are clear. The skull is intact.      Impression:        No acute intracranial hemorrhage. Stable findings.     Electronically signed by:  Fernandez Garcia MD  2/10/2022 3:00 PM CST  Workstation: [id number]            Assessment/Plan: Pt is a 76-yr-old right-handed white male with known diagnosis of HF s/p heart transplant in 2002, hypertension, DM2, and SHAQ who presented after four days of numbness/tingling to right arm and leg. Pt stated numbness started in his right foot and calf and then a couple days later was in his arm and hand. Upon exam he is alert and oriented X4, strengths are equal, numbness/tingling to right arm and leg, and visual field is intact.   1. Right arm and leg numbness- Possibly left thalamic lacunar infarct. CT is neg, will get a MRI and MRA brain and start stroke order set, check lipid panel, A1C, and get an echo. Give  aspirin 325 mg and Lipitor 80 mg.  2. Hypertension- Normal BP goals.  3. DM2- Will check A1C.  4. Activity- Okay to work with PT/OT tomorrow.  5. Diet- ADA heart-healthy diet if he passes bedside dysphagia screening.   Case was discussed with pt, pt's daughter, , ER physician, and nursing. Thank you for the consult.          No diagnosis found.        Reginaldo Wood, ADOLPH  February 10, 2022  15:46 CST

## 2022-02-10 NOTE — H&P
[facility] Medicine Services  INPATIENT HISTORY AND PHYSICAL       Patient Care Team:  Cam Cortez MD as PCP - General (Family Medicine)      Date of Admission: 2/10/2022      Chief complaint   Chief Complaint   Patient presents with   • Tingling     bilaterally, arms       Subjective     Patient is a 76 y.o. male with a past medical history of congestive heart failure status post heart transplant 20 years ago, orthostatic hypotension, CKD stage III, type 2 diabetes mellitus, essential hypertension, GERD and obstructive sleep apnea with CPAP approval pending.  He presents with complaints of right-sided arm and leg numbness of 3 days duration.    Patient developed numbness of the right side of his body about 3 days prior to presentation.  His numbness first started on his right leg then involved his right arm.  He denies weakness of his right arm or leg or any part of the body.  He also had occipital headache during this time and had some neck pain as well.  In addition he had some transient speech difficulty yesterday when he was communicating with his wife.  On account of his persistent symptoms he decided to come to the emergency room today for evaluation.  CT scan of the head was negative for intracranial hemorrhage but an MRI of the brain showed an acute ischemia in the left edis measuring 1 x 0.8 cm. Patient received aspirin loading dose.    At the time of my evaluation, patient is alert and oriented x3.    Review of Systems   Constitutional: Negative for activity change, appetite change, chills, fatigue and fever.   HENT: Negative for congestion, ear pain, rhinorrhea, sore throat and trouble swallowing.    Respiratory: Negative for cough, chest tightness, shortness of breath and wheezing.    Cardiovascular: Negative for chest pain, palpitations and leg swelling.   Gastrointestinal: Negative for abdominal distention, abdominal pain, diarrhea, nausea and vomiting.    Genitourinary: Negative for difficulty urinating, dysuria and hematuria.   Musculoskeletal: Negative for arthralgias, back pain and myalgias.   Skin: Negative for pallor and rash.   Neurological: Positive for numbness. Negative for dizziness, syncope, weakness, light-headedness and headaches.   Hematological: Negative for adenopathy. Does not bruise/bleed easily.   Psychiatric/Behavioral: Negative for agitation and confusion. The patient is not nervous/anxious.      History  Past Medical History:   Diagnosis Date   • Chronic kidney disease    • Diabetes mellitus ([facility])    • GERD (gastroesophageal reflux disease)    • HFrEF (heart failure with reduced ejection fraction) ([facility])    • Hypertension      Past Surgical History:   Procedure Laterality Date   • HEART TRANSPLANT     • HERNIA REPAIR       Family History   Problem Relation Age of Onset   • Heart disease Mother    • Heart disease Father    • Heart disease Brother    • Diabetes Brother      Social History     Tobacco Use   • Smoking status: Former Smoker   • Smokeless tobacco: Former User   Substance Use Topics   • Alcohol use: Never   • Drug use: Never     (Not in a hospital admission)    Allergies:  Levofloxacin  Prior to Admission medications    Medication Sig Start Date End Date Taking? Authorizing Provider   amLODIPine (NORVASC) 10 MG tablet Take 10 mg by mouth Daily. At lunch    Shabana Khan MD   aspirin 81 MG chewable tablet Chew 81 mg Daily. 10/5/18   Shabana Khan MD   azaTHIOprine (IMURAN) 50 MG tablet Take 100 mg by mouth Daily.    Shabana Khan MD   bacitracin 500 UNIT/GM ointment Apply  topically to the appropriate area as directed 2 (Two) Times a Day. 10/1/19   Kim Dobbs MD   calcitriol (ROCALTROL) 0.25 MCG capsule Take 0.25 mcg by mouth Every Other Day.    Shabana Khan MD   calcium-vitamin D (OSCAL-500) 500-200 MG-UNIT per tablet Take 1 tablet by mouth Daily.    Shabana Khan MD   citalopram (CeleXA)  20 MG tablet Take 20 mg by mouth Daily. 10/4/18   Shabana Khan MD   cycloSPORINE modified (GENGRAF) 100 MG capsule Take 100 mg by mouth 2 (Two) Times a Day.    Shabana Khan MD   docusate sodium (COLACE) 100 MG capsule Take 100 mg by mouth As Needed for Constipation.    Shabana Khan MD   doxazosin (CARDURA) 8 MG tablet Take 8 mg by mouth 2 (Two) Times a Day.    Shabana Khan MD   ezetimibe (ZETIA) 10 MG tablet Take 10 mg by mouth Every Night. 10/4/18   Shabana Khan MD   furosemide (LASIX) 40 MG tablet Take 0.5 tablets by mouth Daily. 12/17/21   Rivka Knox APRN   gabapentin (NEURONTIN) 100 MG capsule Take 200 mg by mouth 3 (Three) Times a Day. 10/4/18   Shabana Khan MD   hydrALAZINE (APRESOLINE) 10 MG tablet Take 10 mg by mouth 3 (Three) Times a Day.    Shabana Khan MD   insulin aspart (novoLOG) 100 UNIT/ML injection Inject 23 Units under the skin into the appropriate area as directed 3 (Three) Times a Day.    Shabana Khan MD   insulin glargine (LANTUS) 100 UNIT/ML injection Inject 30 Units under the skin into the appropriate area as directed 2 (Two) Times a Day. 10/4/18   Shabana Khan MD   isosorbide dinitrate (ISORDIL) 20 MG tablet Take 20 mg by mouth 3 (Three) Times a Day. 8:00 am, 1:00 pm' and 6:00 pm 10/4/18   Shabana Khan MD   metoprolol succinate XL (TOPROL-XL) 50 MG 24 hr tablet Take 50 mg by mouth Daily. 10/5/18   Shabana Khan MD   multivitamin with minerals tablet tablet Take 1 tablet by mouth Daily.    Shabana Khan MD   Omega-3 Fatty Acids (FISH OIL) 1000 MG capsule capsule Take 1 capsule by mouth 2 (Two) Times a Day With Meals. 10/4/18   Shabana Khan MD   POTASSIUM CHLORIDE PO Take 40 mEq by mouth Every Night.    Shabana Khan MD   rosuvastatin (CRESTOR) 5 MG tablet Take 5 mg by mouth Every Night.    Shabana Khan MD   Sennosides 15 MG chewable tablet Chew 2 tablets 2  (Two) Times a Day As Needed.    Provider, MD PAOLO Donald have reviewed the patient's current medications    Objective        Vital Signs  Temp:  [98 °F (36.7 °C)] 98 °F (36.7 °C)  Heart Rate:  [] 100  Resp:  [17-19] 18  BP: (161-177)/(85-90) 161/85      Physical Exam  Constitutional:       General: He is not in acute distress.     Appearance: He is well-developed. He is not diaphoretic.   HENT:      Head: Normocephalic.   Eyes:      Conjunctiva/sclera: Conjunctivae normal.      Pupils: Pupils are equal, round, and reactive to light.   Neck:      Thyroid: No thyromegaly.      Vascular: No JVD.      Trachea: No tracheal deviation.   Cardiovascular:      Rate and Rhythm: Normal rate and regular rhythm.      Heart sounds: Normal heart sounds. No murmur heard.  No friction rub. No gallop.    Pulmonary:      Effort: Pulmonary effort is normal. No respiratory distress.      Breath sounds: Normal breath sounds. No stridor. No wheezing or rales.   Chest:      Chest wall: No tenderness.   Abdominal:      General: Bowel sounds are normal. There is no distension.      Palpations: Abdomen is soft. There is no mass.      Tenderness: There is no abdominal tenderness. There is no guarding or rebound.      Hernia: No hernia is present.   Musculoskeletal:         General: No tenderness or deformity. Normal range of motion.      Cervical back: Normal range of motion and neck supple.      Right lower leg: No edema.   Lymphadenopathy:      Cervical: No cervical adenopathy.   Skin:     General: Skin is warm and dry.      Coloration: Skin is not pale.      Findings: No erythema or rash.   Neurological:      Mental Status: He is alert and oriented to person, place, and time.      Cranial Nerves: No cranial nerve deficit.      Sensory: No sensory deficit.      Motor: No abnormal muscle tone.      Coordination: Coordination normal.      Comments: Normal 5/5 power in all limbs.  Numbness and decreased touch sensation in right  upper and right lower limb.   Psychiatric:         Mood and Affect: Mood normal.         Behavior: Behavior normal.         Thought Content: Thought content normal.       Results Review:     Results from last 7 days   Lab Units 02/10/22  1436   SODIUM mmol/L 139   POTASSIUM mmol/L 4.1   CHLORIDE mmol/L 103   CO2 mmol/L 26.0   BUN mg/dL 35*   CREATININE mg/dL 2.10*   GLUCOSE mg/dL 198*   CALCIUM mg/dL 9.4   BILIRUBIN mg/dL 0.3   ALK PHOS U/L 104   ALT (SGPT) U/L 17   AST (SGOT) U/L 19       Results from last 7 days   Lab Units 02/10/22  1436   MAGNESIUM mg/dL 1.7       Results from last 7 days   Lab Units 02/10/22  1436   WBC 10*3/mm3 6.98   HEMOGLOBIN g/dL 10.5*   HEMATOCRIT % 31.6*   PLATELETS 10*3/mm3 248       Results from last 7 days   Lab Units 02/10/22  1436   INR  1.02     Imaging Results (Last 7 Days)     Procedure Component Value Units Date/Time    MRI Angiogram Head Without Contrast [[id number]] Collected: 02/10/22 1747     Updated: 02/10/22 1853    Narrative:      EXAM:  MR BRAIN ANGIOGRAPHY WITHOUT IV CONTRAST    ORDERING PROVIDER:  JAMILAH UREÑA    CLINICAL HISTORY:  Stroke    COMPARISON:       TECHNIQUE:   Time-of-flight MRA of the brain was performed. Images were  reprojected into multiple planes using MIPS reprojections and  viewed in cine mode.    FINDINGS:     INTERNAL CAROTID ARTERIES: Normal course and caliber. No aneurysm  or significant stenosis.    ANTERIOR COMMUNICATING ARTERY: Patent.    POSTERIOR COMMUNICATING ARTERIES: Patent.    ANTERIOR, MIDDLE AND POSTERIOR CEREBRAL ARTERIES: Normal course  and caliber. No aneurysm or significant stenosis.    VERTEBRAL BASILAR SYSTEM: Distal vertebral arteries are  codominant and patent. Basilar artery is normal caliber and  patent. Anterior inferior, posterior inferior and superior  cerebellar arteries are unremarkable.  No aneurysm.      Impression:      No evidence of hemodynamically significant stenosis of the  visualized intracranial arteries.  No  evidence of aneurysm within the [place].    Electronically signed by:  Dax Thurston MD  2/10/2022 6:51 PM CST  Workstation: [id number]    MRI Brain Without Contrast [[id number]] Collected: 02/10/22 1734     Updated: 02/10/22 1850    Narrative:      EXAM:  MR BRAIN WITHOUT IV CONTRAST    ORDERING PROVIDER:  JAMILAH UREÑA    CLINICAL HISTORY:   Stroke    COMPARISON:   CT scan of the brain on the same date    TECHNIQUE:   Axial T1-weighted, T2-weighted and diffusion weighted, FLAIR, and  sagittal T1-weighted images,  T2 gradient echo as well as axial  and coronal T1 weight images with no IV contrast were obtained  using a 1.5 Laury MRI machine.    FINDINGS:    CEREBRAL PARENCHYMA: Scattered T2 and FLAIR hyperintensities.  These are consistent with chronic microangiopathic changes. No  encephalomalacia, mass or gliosis. Mild age dependent cerebral  atrophy.    POSTERIOR FOSSA: No mass or abnormal signal. Unremarkable  craniocervical junction.    EXTRA-AXIAL SPACES: No mass. Unremarkable contour.    PITUITARY: No mass or parasellar abnormality.    ORBITS: No mass. Unremarkable extraocular muscles, globe and  optic nerve.    CALVARIA AND SOFT TISSUES:  No mass, adenopathy, or abnormal  signal.    TEMPORAL BONE AND SKULL BASE: Unremarkable mastoid air cells,  inner and middle ear.    PARANASAL SINUSES: Unremarkable.    VASCULAR STRUCTURES: Normal flow voids are present..    DIFFUSION WEIGHTED IMAGES: Acute restricted diffusion in the left  edis measuring 1 x 0.8 cm in cross-section.    SUSCEPTIBILITY SEQUENCE: No susceptibility artifact.      Impression:      Acute ischemia in the left edis measuring 1 x 0.8 cm in  cross-section.  Age-appropriate cerebral atrophy  Chronic microangiopathic changes.  No evidence of acute intracranial hemorrhage.    Electronically signed by:  Dax Thurston MD  2/10/2022 6:48 PM CST  Workstation: [id number]    MRI Angiogram Neck Without Contrast [[id number]] Resulted: 02/10/22 1746     Updated:  02/10/22 1819    XR Chest 1 View [[id number]] Collected: 02/10/22 1458     Updated: 02/10/22 1513    Narrative:      XR CHEST 1 VIEW     CLINICAL STATEMENT: Stroke Protocol (Onset > 12 hrs)    COMPARISON:  12/16/2021    FINDINGS: Status post median sternotomy. The heart is mildly  enlarged. There is no focal lung consolidation or pleural  effusion. No evidence of pulmonary edema or pneumothorax.      Impression:        No acute cardiopulmonary disease.      Electronically signed by:  Fernandez Garcia MD  2/10/2022 3:11 PM CST  Workstation: [id number]    CT Head Without Contrast Stroke Protocol [[id number]] Collected: 02/10/22 1437     Updated: 02/10/22 1503    Narrative:      CT HEAD WITHOUT IV CONTRAST     CLINICAL STATEMENT: Stroke, follow up    COMPARISON:  CT head dated 11/15/2021.    This exam was performed according to our departmental  dose-optimization program which includes automated exposure  control, adjustment of the mA and/or kVp according to patient  size and/or use of iterative reconstruction technique where  applicable.    Findings: No acute intracranial hemorrhage, mass effect or  midline shift. Ventricles and subarachnoid spaces are moderately  dilated consistent with cerebral atrophy. Visualized paranasal  sinuses and the mastoid air cells are clear. The skull is intact.      Impression:        No acute intracranial hemorrhage. Stable findings.     Electronically signed by:  Fernandez Garcia MD  2/10/2022 3:00 PM CST  Workstation: [id number]          Assessment / Plan       Hospital Problem List:  Principal Problem:    Acute CVA (cerebrovascular accident) (HCC)  Active Problems:    Numbness  Congestive heart failure status post heart transplant 20 years ago on immunosuppressant therapy  History of orthostatic hypotension  CKD stage III  Type 2 diabetes mellitus  Essential hypertension  GERD  Obstructive sleep apnea with CPAP    Plan  Patient has right arm and leg numbness and MRI showing acute infarct in left edis.   Neurology input appreciated.  Continue aspirin 325 mg daily and start atorvastatin 80 mg nightly.  Monitor on telemetry.  Echocardiogram.  PT/OT and speech therapy in the morning.  Allow oral diet.    Allow permissive hypertension.  Continue home medications for s/p cardiac transplant.  NovoLog sliding scale and Levemir for type 2 diabetes mellitus.    DVT prophylaxis with SCDs  CODE STATUS is full code    I discussed the patient's findings and my recommendations with patient.    I have utilized all available immediate resources to obtain, update, or review the patient's current medications.     I confirmed that the patient's Advance Care Plan is present, code status is documented, or surrogate decision maker is listed in the patient's medical record.      Samantha Ko MD  02/10/22  20:05 CST        Part of this note may be an electronic transcription/translation of spoken language to printed text using the Dragon Dictation System.

## 2022-02-11 ENCOUNTER — APPOINTMENT (OUTPATIENT)
Dept: CARDIOLOGY | Facility: HOSPITAL | Age: 77
End: 2022-02-11

## 2022-02-11 LAB
ANION GAP SERPL CALCULATED.3IONS-SCNC: 8 MMOL/L (ref 5–15)
BH CV ECHO MEAS - AO MAX PG (FULL): 0.71 MMHG
BH CV ECHO MEAS - AO MAX PG: 5.3 MMHG
BH CV ECHO MEAS - AO MEAN PG (FULL): 0 MMHG
BH CV ECHO MEAS - AO MEAN PG: 3 MMHG
BH CV ECHO MEAS - AO V2 MAX: 115 CM/SEC
BH CV ECHO MEAS - AO V2 MEAN: 84.8 CM/SEC
BH CV ECHO MEAS - AO V2 VTI: 21.4 CM
BH CV ECHO MEAS - ASC AORTA: 3.4 CM
BH CV ECHO MEAS - AVA(I,A): 3.6 CM^2
BH CV ECHO MEAS - AVA(I,D): 3.6 CM^2
BH CV ECHO MEAS - AVA(V,A): 3.5 CM^2
BH CV ECHO MEAS - AVA(V,D): 3.5 CM^2
BH CV ECHO MEAS - BSA(HAYCOCK): 2 M^2
BH CV ECHO MEAS - BSA: 2 M^2
BH CV ECHO MEAS - BZI_BMI: 29 KILOGRAMS/M^2
BH CV ECHO MEAS - BZI_METRIC_HEIGHT: 170.2 CM
BH CV ECHO MEAS - BZI_METRIC_WEIGHT: 83.9 KG
BH CV ECHO MEAS - EDV(CUBED): 103.2 ML
BH CV ECHO MEAS - EDV(MOD-SP2): 24.9 ML
BH CV ECHO MEAS - EDV(MOD-SP4): 63.5 ML
BH CV ECHO MEAS - EDV(TEICH): 101.9 ML
BH CV ECHO MEAS - EF(CUBED): 80.1 %
BH CV ECHO MEAS - EF(MOD-SP2): 51.4 %
BH CV ECHO MEAS - EF(MOD-SP4): 72 %
BH CV ECHO MEAS - EF(TEICH): 72.5 %
BH CV ECHO MEAS - ESV(CUBED): 20.6 ML
BH CV ECHO MEAS - ESV(MOD-SP2): 12.1 ML
BH CV ECHO MEAS - ESV(MOD-SP4): 17.8 ML
BH CV ECHO MEAS - ESV(TEICH): 28 ML
BH CV ECHO MEAS - FS: 41.6 %
BH CV ECHO MEAS - IVS/LVPW: 1.1
BH CV ECHO MEAS - IVSD: 1.7 CM
BH CV ECHO MEAS - LA DIMENSION: 4.6 CM
BH CV ECHO MEAS - LV DIASTOLIC VOL/BSA (35-75): 32.5 ML/M^2
BH CV ECHO MEAS - LV MASS(C)D: 320.3 GRAMS
BH CV ECHO MEAS - LV MASS(C)DI: 163.7 GRAMS/M^2
BH CV ECHO MEAS - LV MAX PG: 4.6 MMHG
BH CV ECHO MEAS - LV MEAN PG: 3 MMHG
BH CV ECHO MEAS - LV SYSTOLIC VOL/BSA (12-30): 9.1 ML/M^2
BH CV ECHO MEAS - LV V1 MAX: 107 CM/SEC
BH CV ECHO MEAS - LV V1 MEAN: 73.8 CM/SEC
BH CV ECHO MEAS - LV V1 VTI: 20 CM
BH CV ECHO MEAS - LVIDD: 4.7 CM
BH CV ECHO MEAS - LVIDS: 2.7 CM
BH CV ECHO MEAS - LVLD AP2: 7.3 CM
BH CV ECHO MEAS - LVLD AP4: 8.1 CM
BH CV ECHO MEAS - LVLS AP2: 5.9 CM
BH CV ECHO MEAS - LVLS AP4: 5.9 CM
BH CV ECHO MEAS - LVOT AREA (M): 3.8 CM^2
BH CV ECHO MEAS - LVOT AREA: 3.8 CM^2
BH CV ECHO MEAS - LVOT DIAM: 2.2 CM
BH CV ECHO MEAS - LVPWD: 1.5 CM
BH CV ECHO MEAS - MV A MAX VEL: 31.4 CM/SEC
BH CV ECHO MEAS - MV DEC SLOPE: 584 CM/SEC^2
BH CV ECHO MEAS - MV E MAX VEL: 53.2 CM/SEC
BH CV ECHO MEAS - MV E/A: 1.7
BH CV ECHO MEAS - MV P1/2T MAX VEL: 105 CM/SEC
BH CV ECHO MEAS - MV P1/2T: 52.7 MSEC
BH CV ECHO MEAS - MVA P1/2T LCG: 2.1 CM^2
BH CV ECHO MEAS - MVA(P1/2T): 4.2 CM^2
BH CV ECHO MEAS - PA MAX PG (FULL): -0.83 MMHG
BH CV ECHO MEAS - PA MAX PG: 8.3 MMHG
BH CV ECHO MEAS - PA V2 MAX: 144 CM/SEC
BH CV ECHO MEAS - RAP SYSTOLE: 5 MMHG
BH CV ECHO MEAS - RV MAX PG: 9.1 MMHG
BH CV ECHO MEAS - RV MEAN PG: 4 MMHG
BH CV ECHO MEAS - RV V1 MAX: 151 CM/SEC
BH CV ECHO MEAS - RV V1 MEAN: 81.2 CM/SEC
BH CV ECHO MEAS - RV V1 VTI: 24.2 CM
BH CV ECHO MEAS - RVSP: 18.7 MMHG
BH CV ECHO MEAS - SI(CUBED): 42.2 ML/M^2
BH CV ECHO MEAS - SI(LVOT): 38.9 ML/M^2
BH CV ECHO MEAS - SI(MOD-SP2): 6.5 ML/M^2
BH CV ECHO MEAS - SI(MOD-SP4): 23.4 ML/M^2
BH CV ECHO MEAS - SI(TEICH): 37.7 ML/M^2
BH CV ECHO MEAS - SV(CUBED): 82.6 ML
BH CV ECHO MEAS - SV(LVOT): 76 ML
BH CV ECHO MEAS - SV(MOD-SP2): 12.8 ML
BH CV ECHO MEAS - SV(MOD-SP4): 45.7 ML
BH CV ECHO MEAS - SV(TEICH): 73.8 ML
BH CV ECHO MEAS - TR MAX VEL: 185 CM/SEC
BUN SERPL-MCNC: 36 MG/DL (ref 8–23)
BUN/CREAT SERPL: 18.7 (ref 7–25)
CALCIUM SPEC-SCNC: 9.2 MG/DL (ref 8.6–10.5)
CHLORIDE SERPL-SCNC: 107 MMOL/L (ref 98–107)
CHOLEST SERPL-MCNC: 137 MG/DL (ref 0–200)
CO2 SERPL-SCNC: 24 MMOL/L (ref 22–29)
CREAT SERPL-MCNC: 1.93 MG/DL (ref 0.76–1.27)
DEPRECATED RDW RBC AUTO: 44.4 FL (ref 37–54)
EOSINOPHIL # BLD MANUAL: 0.19 10*3/MM3 (ref 0–0.4)
EOSINOPHIL NFR BLD MANUAL: 3 % (ref 0.3–6.2)
ERYTHROCYTE [DISTWIDTH] IN BLOOD BY AUTOMATED COUNT: 15.3 % (ref 12.3–15.4)
GFR SERPL CREATININE-BSD FRML MDRD: 34 ML/MIN/1.73
GLUCOSE BLDC GLUCOMTR-MCNC: 101 MG/DL (ref 70–130)
GLUCOSE BLDC GLUCOMTR-MCNC: 170 MG/DL (ref 70–130)
GLUCOSE BLDC GLUCOMTR-MCNC: 224 MG/DL (ref 70–130)
GLUCOSE BLDC GLUCOMTR-MCNC: 263 MG/DL (ref 70–130)
GLUCOSE BLDC GLUCOMTR-MCNC: 69 MG/DL (ref 70–130)
GLUCOSE SERPL-MCNC: 54 MG/DL (ref 65–99)
HBA1C MFR BLD: 10.3 % (ref 4.8–5.6)
HCT VFR BLD AUTO: 30.1 % (ref 37.5–51)
HDLC SERPL-MCNC: 36 MG/DL (ref 40–60)
HGB BLD-MCNC: 10 G/DL (ref 13–17.7)
LDLC SERPL CALC-MCNC: 73 MG/DL (ref 0–100)
LDLC/HDLC SERPL: 1.89 {RATIO}
LYMPHOCYTES # BLD MANUAL: 4.12 10*3/MM3 (ref 0.7–3.1)
LYMPHOCYTES NFR BLD MANUAL: 9 % (ref 5–12)
MAXIMAL PREDICTED HEART RATE: 143 BPM
MCH RBC QN AUTO: 26.4 PG (ref 26.6–33)
MCHC RBC AUTO-ENTMCNC: 33.2 G/DL (ref 31.5–35.7)
MCV RBC AUTO: 79.4 FL (ref 79–97)
MONOCYTES # BLD: 0.58 10*3/MM3 (ref 0.1–0.9)
NEUTROPHILS # BLD AUTO: 1.55 10*3/MM3 (ref 1.7–7)
NEUTROPHILS NFR BLD MANUAL: 24 % (ref 42.7–76)
PLAT MORPH BLD: NORMAL
PLATELET # BLD AUTO: 239 10*3/MM3 (ref 140–450)
PMV BLD AUTO: 9.5 FL (ref 6–12)
POTASSIUM SERPL-SCNC: 4.1 MMOL/L (ref 3.5–5.2)
RBC # BLD AUTO: 3.79 10*6/MM3 (ref 4.14–5.8)
RBC MORPH BLD: NORMAL
SODIUM SERPL-SCNC: 139 MMOL/L (ref 136–145)
STRESS TARGET HR: 122 BPM
TRIGL SERPL-MCNC: 164 MG/DL (ref 0–150)
VARIANT LYMPHS NFR BLD MANUAL: 1 % (ref 0–5)
VARIANT LYMPHS NFR BLD MANUAL: 63 % (ref 19.6–45.3)
VLDLC SERPL-MCNC: 28 MG/DL (ref 5–40)
WBC MORPH BLD: NORMAL
WBC NRBC COR # BLD: 6.44 10*3/MM3 (ref 3.4–10.8)

## 2022-02-11 PROCEDURE — 63710000001 INSULIN ASPART PER 5 UNITS: Performed by: INTERNAL MEDICINE

## 2022-02-11 PROCEDURE — 80061 LIPID PANEL: CPT | Performed by: INTERNAL MEDICINE

## 2022-02-11 PROCEDURE — G0378 HOSPITAL OBSERVATION PER HR: HCPCS

## 2022-02-11 PROCEDURE — 97162 PT EVAL MOD COMPLEX 30 MIN: CPT

## 2022-02-11 PROCEDURE — 85007 BL SMEAR W/DIFF WBC COUNT: CPT | Performed by: INTERNAL MEDICINE

## 2022-02-11 PROCEDURE — 85025 COMPLETE CBC W/AUTO DIFF WBC: CPT | Performed by: INTERNAL MEDICINE

## 2022-02-11 PROCEDURE — 63710000001 AZATHIOPRINE PER 50 MG: Performed by: INTERNAL MEDICINE

## 2022-02-11 PROCEDURE — 80048 BASIC METABOLIC PNL TOTAL CA: CPT | Performed by: INTERNAL MEDICINE

## 2022-02-11 PROCEDURE — 93306 TTE W/DOPPLER COMPLETE: CPT

## 2022-02-11 PROCEDURE — 63710000001 CYCLOSPORINE MODIFIED PER 25 MG: Performed by: INTERNAL MEDICINE

## 2022-02-11 PROCEDURE — 92610 EVALUATE SWALLOWING FUNCTION: CPT | Performed by: SPEECH-LANGUAGE PATHOLOGIST

## 2022-02-11 PROCEDURE — 97166 OT EVAL MOD COMPLEX 45 MIN: CPT

## 2022-02-11 PROCEDURE — 82962 GLUCOSE BLOOD TEST: CPT

## 2022-02-11 PROCEDURE — 83036 HEMOGLOBIN GLYCOSYLATED A1C: CPT | Performed by: INTERNAL MEDICINE

## 2022-02-11 RX ORDER — ROSUVASTATIN CALCIUM 20 MG/1
20 TABLET, COATED ORAL NIGHTLY
Status: DISCONTINUED | OUTPATIENT
Start: 2022-02-11 | End: 2022-02-12 | Stop reason: HOSPADM

## 2022-02-11 RX ORDER — ASPIRIN 81 MG/1
81 TABLET, CHEWABLE ORAL DAILY
Status: DISCONTINUED | OUTPATIENT
Start: 2022-02-12 | End: 2022-02-12 | Stop reason: HOSPADM

## 2022-02-11 RX ORDER — AMLODIPINE BESYLATE 10 MG/1
10 TABLET ORAL
Status: DISCONTINUED | OUTPATIENT
Start: 2022-02-11 | End: 2022-02-12 | Stop reason: HOSPADM

## 2022-02-11 RX ORDER — HYDRALAZINE HYDROCHLORIDE 20 MG/ML
20 INJECTION INTRAMUSCULAR; INTRAVENOUS EVERY 6 HOURS PRN
Status: DISCONTINUED | OUTPATIENT
Start: 2022-02-11 | End: 2022-02-12 | Stop reason: HOSPADM

## 2022-02-11 RX ORDER — CLOPIDOGREL BISULFATE 75 MG/1
75 TABLET ORAL DAILY
Status: DISCONTINUED | OUTPATIENT
Start: 2022-02-11 | End: 2022-02-12 | Stop reason: HOSPADM

## 2022-02-11 RX ADMIN — AZATHIOPRINE 100 MG: 50 TABLET ORAL at 09:49

## 2022-02-11 RX ADMIN — Medication 1 TABLET: at 08:30

## 2022-02-11 RX ADMIN — FUROSEMIDE 40 MG: 40 TABLET ORAL at 08:31

## 2022-02-11 RX ADMIN — ISOSORBIDE DINITRATE 20 MG: 20 TABLET ORAL at 15:33

## 2022-02-11 RX ADMIN — SODIUM CHLORIDE, PRESERVATIVE FREE 10 ML: 5 INJECTION INTRAVENOUS at 09:50

## 2022-02-11 RX ADMIN — ASPIRIN 325 MG: 325 TABLET ORAL at 08:30

## 2022-02-11 RX ADMIN — TERAZOSIN HYDROCHLORIDE 5 MG: 5 CAPSULE ORAL at 08:30

## 2022-02-11 RX ADMIN — ACETAMINOPHEN 650 MG: 325 TABLET, FILM COATED ORAL at 02:21

## 2022-02-11 RX ADMIN — METOPROLOL SUCCINATE 100 MG: 50 TABLET, EXTENDED RELEASE ORAL at 08:29

## 2022-02-11 RX ADMIN — GABAPENTIN 200 MG: 100 CAPSULE ORAL at 08:31

## 2022-02-11 RX ADMIN — CLOPIDOGREL BISULFATE 75 MG: 75 TABLET, FILM COATED ORAL at 09:48

## 2022-02-11 RX ADMIN — GABAPENTIN 200 MG: 100 CAPSULE ORAL at 20:32

## 2022-02-11 RX ADMIN — ROSUVASTATIN CALCIUM 20 MG: 20 TABLET, FILM COATED ORAL at 20:32

## 2022-02-11 RX ADMIN — CYCLOSPORINE 75 MG: 25 CAPSULE, LIQUID FILLED ORAL at 20:33

## 2022-02-11 RX ADMIN — POTASSIUM CHLORIDE 40 MEQ: 1.5 POWDER, FOR SOLUTION ORAL at 20:33

## 2022-02-11 RX ADMIN — SODIUM CHLORIDE, PRESERVATIVE FREE 10 ML: 5 INJECTION INTRAVENOUS at 20:40

## 2022-02-11 RX ADMIN — ISOSORBIDE DINITRATE 20 MG: 20 TABLET ORAL at 18:09

## 2022-02-11 RX ADMIN — AMLODIPINE BESYLATE 10 MG: 10 TABLET ORAL at 18:09

## 2022-02-11 RX ADMIN — ISOSORBIDE DINITRATE 20 MG: 20 TABLET ORAL at 08:31

## 2022-02-11 RX ADMIN — CYCLOSPORINE 75 MG: 25 CAPSULE, LIQUID FILLED ORAL at 09:48

## 2022-02-11 RX ADMIN — TERAZOSIN HYDROCHLORIDE 5 MG: 5 CAPSULE ORAL at 20:32

## 2022-02-11 RX ADMIN — INSULIN ASPART 8 UNITS: 100 INJECTION, SOLUTION INTRAVENOUS; SUBCUTANEOUS at 15:32

## 2022-02-11 RX ADMIN — INSULIN ASPART 12 UNITS: 100 INJECTION, SOLUTION INTRAVENOUS; SUBCUTANEOUS at 11:28

## 2022-02-11 RX ADMIN — GABAPENTIN 200 MG: 100 CAPSULE ORAL at 15:33

## 2022-02-11 NOTE — PROGRESS NOTES
Stroke Progress Note       Chief Complaint: Right-sided numbness    Subjective     HPI: Pt is a 76-yr-old right-handed white male with known diagnosis of HF s/p heart transplant in 2002, hypertension, DM2, and SHAQ who presented after four days of numbness/tingling to right arm and leg. This morning he states feeling well. Strengths are equal 5/5, decreased sensation to right arm and leg, visual field is intact.       Review of Systems   HENT: Negative.    Eyes: Negative.    Respiratory: Negative.    Cardiovascular: Negative.    Gastrointestinal: Negative.    Genitourinary: Negative.    Musculoskeletal: Negative.    Neurological: Positive for numbness.   Psychiatric/Behavioral: Negative.         Objective      Temp:  [97.6 °F (36.4 °C)-98.3 °F (36.8 °C)] 98.3 °F (36.8 °C)  Heart Rate:  [] 94  Resp:  [17-19] 18  BP: (135-190)/(64-98) 135/64    Neurological Exam  Mental Status  Awake and alert. Oriented to person, place, time and situation. Speech is normal. Language is fluent with no aphasia.    Cranial Nerves  CN II: Visual acuity is normal. Visual fields full to confrontation.  CN III, IV, VI: Extraocular movements intact bilaterally. Normal lids and orbits bilaterally. Pupils equal round and reactive to light bilaterally.  CN V: Facial sensation is normal.  CN VII: Full and symmetric facial movement.  CN IX, X: Palate elevates symmetrically. Normal gag reflex.  CN XI: Shoulder shrug strength is normal.  CN XII: Tongue midline without atrophy or fasciculations.    Motor  Normal muscle bulk throughout. No fasciculations present. Strength is 5/5 throughout all four extremities.    Sensory  Light touch abnormality: Right arm and leg.     Reflexes  Not assessed.    Coordination  Finger-to-nose, rapid alternating movements and heel-to-shin normal bilaterally without dysmetria.    Gait  Not assessed.      Physical Exam  Vitals and nursing note reviewed.   Constitutional:       General: He is awake.      Appearance:  Normal appearance.   HENT:      Head: Normocephalic and atraumatic.   Eyes:      General: Lids are normal.      Extraocular Movements: Extraocular movements intact.      Pupils: Pupils are equal, round, and reactive to light.   Cardiovascular:      Rate and Rhythm: Normal rate.   Pulmonary:      Effort: Pulmonary effort is normal.   Musculoskeletal:         General: Normal range of motion.      Cervical back: Normal range of motion.   Skin:     General: Skin is warm and dry.   Neurological:      Mental Status: He is alert and oriented to person, place, and time.      Sensory: Sensory deficit present.      Coordination: Coordination is intact.      Deep Tendon Reflexes: Strength normal.   Psychiatric:         Mood and Affect: Mood normal.         Speech: Speech normal.         Results Review:    I reviewed the patient's new clinical results.    Lab Results (last 24 hours)       Procedure Component Value Units Date/Time    POC Glucose Once [[id number]]  (Abnormal) Collected: 02/11/22 0715    Specimen: Blood Updated: 02/11/22 0726     Glucose 69 mg/dL      Comment: RN NotifiedOperator: [id number] SYLVESTER TAYLORMeter ID: [id number]       Manual Differential [[id number]]  (Abnormal) Collected: 02/11/22 0535    Specimen: Blood Updated: 02/11/22 0715     Neutrophil % 24.0 %      Lymphocyte % 63.0 %      Monocyte % 9.0 %      Eosinophil % 3.0 %      Atypical Lymphocyte % 1.0 %      Neutrophils Absolute 1.55 10*3/mm3      Lymphocytes Absolute 4.12 10*3/mm3      Monocytes Absolute 0.58 10*3/mm3      Eosinophils Absolute 0.19 10*3/mm3      RBC Morphology Normal     WBC Morphology Normal     Platelet Morphology Normal    Hemoglobin A1c [[id number]]  (Abnormal) Collected: 02/11/22 0535    Specimen: Blood Updated: 02/11/22 0712     Hemoglobin A1C 10.30 %     Narrative:      Hemoglobin A1C Ranges:    Increased Risk for Diabetes  5.7% to 6.4%  Diabetes                     >= 6.5%  Diabetic Goal                < 7.0%    Basic  Metabolic Panel [[id number]]  (Abnormal) Collected: 02/11/22 0535    Specimen: Blood Updated: 02/11/22 0700     Glucose 54 mg/dL      BUN 36 mg/dL      Creatinine 1.93 mg/dL      Sodium 139 mmol/L      Potassium 4.1 mmol/L      Chloride 107 mmol/L      CO2 24.0 mmol/L      Calcium 9.2 mg/dL      eGFR Non African Amer 34 mL/min/1.73      BUN/Creatinine Ratio 18.7     Anion Gap 8.0 mmol/L     Narrative:      GFR Normal >60  Chronic Kidney Disease <60  Kidney Failure <15      Lipid Panel [[id number]]  (Abnormal) Collected: 02/11/22 0535    Specimen: Blood Updated: 02/11/22 0700     Total Cholesterol 137 mg/dL      Triglycerides 164 mg/dL      HDL Cholesterol 36 mg/dL      LDL Cholesterol  73 mg/dL      VLDL Cholesterol 28 mg/dL      LDL/HDL Ratio 1.89    Narrative:      Cholesterol Reference Ranges  (U.S. Department of Health and Human Services ATP III Classifications)    Desirable          <200 mg/dL  Borderline High    200-239 mg/dL  High Risk          >240 mg/dL      Triglyceride Reference Ranges  (U.S. Department of Health and Human Services ATP III Classifications)    Normal           <150 mg/dL  Borderline High  150-199 mg/dL  High             200-499 mg/dL  Very High        >500 mg/dL    HDL Reference Ranges  (U.S. Department of Health and Human Services ATP III Classifications)    Low     <40 mg/dl (major risk factor for CHD)  High    >60 mg/dl ('negative' risk factor for CHD)        LDL Reference Ranges  (U.S. Department of Health and Human Services ATP III Classifications)    Optimal          <100 mg/dL  Near Optimal     100-129 mg/dL  Borderline High  130-159 mg/dL  High             160-189 mg/dL  Very High        >189 mg/dL    CBC & Differential [[id number]]  (Abnormal) Collected: 02/11/22 0535    Specimen: Blood Updated: 02/11/22 0646    Narrative:      The following orders were created for panel order CBC & Differential.  Procedure                               Abnormality         Status                      ---------                               -----------         ------                     CBC Auto Differential[[id number]]        Abnormal            Final result               Scan Slide[[id number]]                                                                    Please view results for these tests on the individual orders.    CBC Auto Differential [[id number]]  (Abnormal) Collected: 02/11/22 0535    Specimen: Blood Updated: 02/11/22 0646     WBC 6.44 10*3/mm3      RBC 3.79 10*6/mm3      Hemoglobin 10.0 g/dL      Hematocrit 30.1 %      MCV 79.4 fL      MCH 26.4 pg      MCHC 33.2 g/dL      RDW 15.3 %      RDW-SD 44.4 fl      MPV 9.5 fL      Platelets 239 10*3/mm3     POC Glucose Once [[id number]]  (Abnormal) Collected: 02/10/22 2140    Specimen: Blood Updated: 02/10/22 2152     Glucose 221 mg/dL      Comment: RN NotifiedOperator: [id number] JASON MCKEONNIFERMeter ID: [id number]       [place] Draw [[id number]] Collected: 02/10/22 1436    Specimen: Blood Updated: 02/10/22 1545    Narrative:      The following orders were created for panel order [place] Draw.  Procedure                               Abnormality         Status                     ---------                               -----------         ------                     Green Top (Gel)[[id number]]                                  Final result               Lavender Top[[id number]]                                     Final result               Gold Top - SST[[id number]]                                   Final result               Light Blue Top[[id number]]                                   Final result                 Please view results for these tests on the individual orders.    Green Top (Gel) [[id number]] Collected: 02/10/22 1436    Specimen: Blood Updated: 02/10/22 1545     Extra Tube Hold for add-ons.     Comment: Auto resulted.       Lavender Top [[id number]] Collected: 02/10/22 1436    Specimen: Blood Updated: 02/10/22 1545     Extra Tube hold for add-on      Comment: Auto resulted       Gold Top - SST [[id number]] Collected: 02/10/22 1436    Specimen: Blood Updated: 02/10/22 1545     Extra Tube Hold for add-ons.     Comment: Auto resulted.       Light Blue Top [[id number]] Collected: 02/10/22 1436    Specimen: Blood Updated: 02/10/22 1545     Extra Tube hold for add-on     Comment: Auto resulted       COVID-19 and FLU A/B PCR - Swab, Nasopharynx [[id number]]  (Normal) Collected: 02/10/22 1517    Specimen: Swab from Nasopharynx Updated: 02/10/22 1541     COVID19 Not Detected     Influenza A PCR Not Detected     Influenza B PCR Not Detected    Narrative:      Fact sheet for providers: https://www.fda.gov/media/828505/download    Fact sheet for patients: https://www.fda.gov/media/977087/download    Test performed by PCR.    Protime-INR [[id number]]  (Normal) Collected: 02/10/22 1436    Specimen: Blood Updated: 02/10/22 1522     Protime 13.3 Seconds      INR 1.02    Narrative:      Therapeutic range for most indications is 2.0-3.0 INR,  or 2.5-3.5 for mechanical heart valves.    CK [[id number]]  (Normal) Collected: 02/10/22 1436    Specimen: Blood Updated: 02/10/22 1521     Creatine Kinase 101 U/L     Magnesium [[id number]]  (Normal) Collected: 02/10/22 1436    Specimen: Blood Updated: 02/10/22 1521     Magnesium 1.7 mg/dL     Comprehensive Metabolic Panel [[id number]]  (Abnormal) Collected: 02/10/22 1436    Specimen: Blood Updated: 02/10/22 1509     Glucose 198 mg/dL      BUN 35 mg/dL      Creatinine 2.10 mg/dL      Sodium 139 mmol/L      Potassium 4.1 mmol/L      Chloride 103 mmol/L      CO2 26.0 mmol/L      Calcium 9.4 mg/dL      Total Protein 7.6 g/dL      Albumin 4.40 g/dL      ALT (SGPT) 17 U/L      AST (SGOT) 19 U/L      Alkaline Phosphatase 104 U/L      Total Bilirubin 0.3 mg/dL      eGFR Non African Amer 31 mL/min/1.73      Globulin 3.2 gm/dL      A/G Ratio 1.4 g/dL      BUN/Creatinine Ratio 16.7     Anion Gap 10.0 mmol/L     Narrative:      GFR Normal >60  Chronic Kidney  Disease <60  Kidney Failure <15      CBC & Differential [[id number]]  (Abnormal) Collected: 02/10/22 1436    Specimen: Blood Updated: 02/10/22 1448    Narrative:      The following orders were created for panel order CBC & Differential.  Procedure                               Abnormality         Status                     ---------                               -----------         ------                     CBC Auto Differential[[id number]]        Abnormal            Final result                 Please view results for these tests on the individual orders.    CBC Auto Differential [[id number]]  (Abnormal) Collected: 02/10/22 1436    Specimen: Blood Updated: 02/10/22 1448     WBC 6.98 10*3/mm3      RBC 3.96 10*6/mm3      Hemoglobin 10.5 g/dL      Hematocrit 31.6 %      MCV 79.8 fL      MCH 26.5 pg      MCHC 33.2 g/dL      RDW 15.0 %      RDW-SD 44.1 fl      MPV 9.3 fL      Platelets 248 10*3/mm3      Neutrophil % 59.0 %      Lymphocyte % 32.7 %      Monocyte % 6.7 %      Eosinophil % 1.0 %      Basophil % 0.3 %      Immature Grans % 0.3 %      Neutrophils, Absolute 4.12 10*3/mm3      Lymphocytes, Absolute 2.28 10*3/mm3      Monocytes, Absolute 0.47 10*3/mm3      Eosinophils, Absolute 0.07 10*3/mm3      Basophils, Absolute 0.02 10*3/mm3      Immature Grans, Absolute 0.02 10*3/mm3      nRBC 0.0 /100 WBC           MRI Angiogram Head Without Contrast    Result Date: 2/10/2022  No evidence of hemodynamically significant stenosis of the visualized intracranial arteries. No evidence of aneurysm within the [place]. Electronically signed by:  Dax Thurston MD  2/10/2022 6:51 PM CST Workstation: [id number]    MRI Angiogram Neck Without Contrast    Result Date: 2/10/2022  There is 50-69% of diameter narrowing proximally at the origin of left ICA. No evidence of hemodynamically significant stenosis of the right proximal ICA. The right vertebral artery is dominant. Electronically signed by:  Dax Thurston MD  2/10/2022 8:21 PM CST  Workstation: [id number]    MRI Brain Without Contrast    Result Date: 2/10/2022  Acute ischemia in the left edis measuring 1 x 0.8 cm in cross-section. Age-appropriate cerebral atrophy Chronic microangiopathic changes. No evidence of acute intracranial hemorrhage. Electronically signed by:  Dax Thurston MD  2/10/2022 6:48 PM CST Workstation: [id number]    XR Chest 1 View    Result Date: 2/10/2022  No acute cardiopulmonary disease. Electronically signed by:  Fernandez Garcia MD  2/10/2022 3:11 PM CST Workstation: [id number]    CT Head Without Contrast Stroke Protocol    Result Date: 2/10/2022  No acute intracranial hemorrhage. Stable findings. Electronically signed by:  Fernandez Garcia MD  2/10/2022 3:00 PM CST Workstation: [id number]           Assessment/Plan     Assessment/Plan: Pt is a 76-yr-old right-handed white male with known diagnosis of HF s/p heart transplant in 2002, hypertension, DM2, and SHAQ who presented after four days of numbness/tingling to right arm and leg. This morning he states feeling well. Strengths are equal 5/5, decreased sensation to right arm and leg, visual field is intact.   1. Left pontine stroke- MRA shows left ICA stenosis 50-69%, continue aspirin 81 mg, Plavix 75 mg/day, and Lipitor 80 mg/day. Continue DAPT for 3 months then stop Plavix and take aspirin 325 mg/day. Echo pending. Reviewed stroke booklet, prevention, risk factors, and s/s of stroke and to call 911. Pt verbalized understanding.   2. Hypertension- Normal BP goals. Instructed on the importance of daily BP monitoring and control to reduce stroke risk. Pt states he monitors BP daily.  3. Hyperlipidemia- LDL is 73, continue Lipitor 80 mg/day.  4. DM2- A1C is 10.3, instructed on the importance of glucose control to reduce stroke risk, inpt nutrition consult ordered for diabetic teaching.  5. Activity- Okay to work with PT/OT today.  6. Diet- ADA heart-healthy diet.  Case was discussed with pt, Dr. Malki, Hospitalist team, and nursing.            Patient Active Problem List   Diagnosis    Near syncope    Orthostatic dizziness    SHAQ (acute kidney injury) ([facility])    Numbness    Acute CVA (cerebrovascular accident) ([facility])           ADOLPH Wolf  02/11/22  08:49 CST

## 2022-02-11 NOTE — PROGRESS NOTES
[facility]  INPATIENT PROGRESS NOTE    Length of Stay: 0  Date of Admission: 2/10/2022  Primary Care Physician: Cam Cortez MD    Subjective   Chief Complaint: Right arm and leg numbness    HPI: Patient has persistence of his right arm and leg numbness. He denies weakness of any limb.    Review of Systems   Constitutional: Negative for activity change, appetite change, chills, fatigue and fever.   HENT: Negative for congestion, ear pain, rhinorrhea, sore throat and trouble swallowing.    Respiratory: Negative for cough, chest tightness, shortness of breath and wheezing.    Cardiovascular: Negative for chest pain, palpitations and leg swelling.   Gastrointestinal: Negative for abdominal distention, abdominal pain, diarrhea, nausea and vomiting.   Genitourinary: Negative for difficulty urinating, dysuria and hematuria.   Musculoskeletal: Negative for arthralgias, back pain and myalgias.   Skin: Negative for pallor and rash.   Neurological: Positive for numbness. Negative for dizziness, syncope, weakness, light-headedness and headaches.   Hematological: Negative for adenopathy. Does not bruise/bleed easily.   Psychiatric/Behavioral: Negative for agitation and confusion. The patient is not nervous/anxious.      Objective    Temp:  [97.6 °F (36.4 °C)-98.4 °F (36.9 °C)] 98.3 °F (36.8 °C)  Heart Rate:  [73-94] 76  Resp:  [18] 18  BP: (135-194)/(64-98) 186/72    Physical Exam  Constitutional:       General: He is not in acute distress.     Appearance: He is well-developed. He is not diaphoretic.   HENT:      Head: Normocephalic.   Eyes:      Conjunctiva/sclera: Conjunctivae normal.      Pupils: Pupils are equal, round, and reactive to light.   Neck:      Thyroid: No thyromegaly.      Vascular: No JVD.      Trachea: No tracheal deviation.   Cardiovascular:      Rate and Rhythm: Normal rate and regular rhythm.      Heart sounds: Normal heart sounds. No murmur  heard.  No friction rub. No gallop.    Pulmonary:      Effort: Pulmonary effort is normal. No respiratory distress.      Breath sounds: Normal breath sounds. No stridor. No wheezing or rales.   Chest:      Chest wall: No tenderness.   Abdominal:      General: Bowel sounds are normal. There is no distension.      Palpations: Abdomen is soft. There is no mass.      Tenderness: There is no abdominal tenderness. There is no guarding or rebound.      Hernia: No hernia is present.   Musculoskeletal:         General: No tenderness or deformity. Normal range of motion.      Cervical back: Normal range of motion and neck supple.      Right lower leg: No edema.      Left lower leg: No edema.   Lymphadenopathy:      Cervical: No cervical adenopathy.   Skin:     General: Skin is warm and dry.      Coloration: Skin is not pale.      Findings: No erythema or rash.   Neurological:      General: No focal deficit present.      Mental Status: He is alert and oriented to person, place, and time.      Cranial Nerves: No cranial nerve deficit.      Sensory: Sensory deficit present.      Motor: No abnormal muscle tone.      Coordination: Coordination normal.      Comments: Numbess in right arm and leg.   Psychiatric:         Mood and Affect: Mood normal.         Behavior: Behavior normal.         Thought Content: Thought content normal.       Medication Review:    Current Facility-Administered Medications:   •  acetaminophen (TYLENOL) tablet 650 mg, 650 mg, Oral, Q4H PRN, Samantha Ko MD, 650 mg at 02/11/22 0221  •  amLODIPine (NORVASC) tablet 10 mg, 10 mg, Oral, Q24H, Samantha Ko MD  •  [START ON 2/12/2022] aspirin chewable tablet 81 mg, 81 mg, Oral, Daily, Reginaldo Wood APRN  •  azaTHIOprine (IMURAN) tablet 100 mg, 100 mg, Oral, Daily, Samantha Ko MD, 100 mg at 02/11/22 0942  •  [START ON 2/12/2022] calcitriol (ROCALTROL) capsule 0.25 mcg, 0.25 mcg, Oral, Every Other Day, Samatnha Ko MD  •  Calcium  Carbonate-Vitamin D3 600-400 MG-UNIT tablet 1 tablet, 1 tablet, Oral, Daily, Samantha Ko MD, 1 tablet at 02/11/22 0830  •  clopidogrel (PLAVIX) tablet 75 mg, 75 mg, Oral, Daily, Reginaldo Wood APRN, 75 mg at 02/11/22 0948  •  cycloSPORINE modified (NEORAL) capsule 75 mg, 75 mg, Oral, BID, Samantha Ko MD, 75 mg at 02/11/22 0948  •  dextrose (D50W) (25 g/50 mL) IV injection 25 g, 25 g, Intravenous, Q15 Min PRN, Samantha Ko MD  •  dextrose (GLUTOSE) oral gel 15 g, 15 g, Oral, Q15 Min PRN, Samantha Ko MD  •  furosemide (LASIX) tablet 40 mg, 40 mg, Oral, Daily, Samantha Ko MD, 40 mg at 02/11/22 0831  •  gabapentin (NEURONTIN) capsule 200 mg, 200 mg, Oral, TID, Samantha Ko MD, 200 mg at 02/11/22 1533  •  glucagon (human recombinant) (GLUCAGEN DIAGNOSTIC) injection 1 mg, 1 mg, Intramuscular, Q15 Min PRN, Samantha Ko MD  •  hydrALAZINE (APRESOLINE) injection 20 mg, 20 mg, Intravenous, Q6H PRN, Samantha Ko MD  •  insulin aspart (novoLOG) injection 0-24 Units, 0-24 Units, Subcutaneous, TID AC, Samantha Ko MD, 8 Units at 02/11/22 1532  •  insulin detemir (LEVEMIR) injection 20 Units, 20 Units, Subcutaneous, Q12H, Samantha Ko MD, 20 Units at 02/10/22 2327  •  isosorbide dinitrate (ISORDIL) tablet 20 mg, 20 mg, Oral, TID - Nitrates, Samantha Ko MD, 20 mg at 02/11/22 1533  •  metoprolol succinate XL (TOPROL-XL) 24 hr tablet 100 mg, 100 mg, Oral, Daily, Samantha Ko MD, 100 mg at 02/11/22 0829  •  morphine injection 1 mg, 1 mg, Intravenous, Q4H PRN **AND** naloxone (NARCAN) injection 0.4 mg, 0.4 mg, Intravenous, Q5 Min PRN, Samantha Ko MD  •  multivitamin with minerals 1 tablet, 1 tablet, Oral, Daily, Samantha Ko MD, 1 tablet at 02/11/22 0830  •  ondansetron (ZOFRAN) injection 4 mg, 4 mg, Intravenous, Q6H PRN, Samantha Ko MD  •  potassium chloride (KLOR-CON) packet 40 mEq, 40 mEq, Oral, Nightly, Samantha Ko  MD MONY, 40 mEq at 02/10/22 2141  •  rosuvastatin (CRESTOR) tablet 20 mg, 20 mg, Oral, Nightly, Reginaldo Wood APRN  •  senna (SENOKOT) tablet 1 tablet, 1 tablet, Oral, BID PRN, Samantha Ko MD  •  sodium chloride 0.9 % flush 10 mL, 10 mL, Intravenous, PRN, Samantha Ko MD  •  sodium chloride 0.9 % flush 10 mL, 10 mL, Intravenous, Q12H, Samantha Ko MD, 10 mL at 02/11/22 0950  •  sodium chloride 0.9 % flush 10 mL, 10 mL, Intravenous, PRN, Samantha Ko MD  •  sodium chloride 0.9 % flush 10 mL, 10 mL, Intravenous, Q12H, Samantha Ko MD, 10 mL at 02/11/22 0950  •  sodium chloride 0.9 % flush 10 mL, 10 mL, Intravenous, PRN, Samantha Ko MD  •  terazosin (HYTRIN) capsule 5 mg, 5 mg, Oral, Q12H, Samantha Ko MD, 5 mg at 02/11/22 0830    I have reviewed the patient's current medications.     Results Review:  I have reviewed the labs, radiology results, and diagnostic studies.    Laboratory Data:   Results from last 7 days   Lab Units 02/11/22  0535 02/10/22  1436   SODIUM mmol/L 139 139   POTASSIUM mmol/L 4.1 4.1   CHLORIDE mmol/L 107 103   CO2 mmol/L 24.0 26.0   BUN mg/dL 36* 35*   CREATININE mg/dL 1.93* 2.10*   GLUCOSE mg/dL 54* 198*   CALCIUM mg/dL 9.2 9.4   BILIRUBIN mg/dL  --  0.3   ALK PHOS U/L  --  104   ALT (SGPT) U/L  --  17   AST (SGOT) U/L  --  19   ANION GAP mmol/L 8.0 10.0     Estimated Creatinine Clearance: 33.2 mL/min (A) (by C-G formula based on SCr of 1.93 mg/dL (H)).  Results from last 7 days   Lab Units 02/10/22  1436   MAGNESIUM mg/dL 1.7         Results from last 7 days   Lab Units 02/11/22  0535 02/10/22  1436   WBC 10*3/mm3 6.44 6.98   HEMOGLOBIN g/dL 10.0* 10.5*   HEMATOCRIT % 30.1* 31.6*   PLATELETS 10*3/mm3 239 248     Results from last 7 days   Lab Units 02/10/22  1436   INR  1.02       Culture Data:   No results found for: BLOODCX  No results found for: URINECX  No results found for: RESPCX  No results found for: WOUNDCX  No results found for:  STOOLCX  No components found for: BODYFLD    Radiology Data:   Imaging Results (Last 24 Hours)     Procedure Component Value Units Date/Time    MRI Angiogram Neck Without Contrast [[id number]] Collected: 02/10/22 1746     Updated: 02/10/22 2023    Narrative:      EXAM:  MR NECK ANGIOGRAPHY WITHOUT IV CONTRAST    ORDERING PROVIDER:  JAMILAH UREÑA    CLINICAL HISTORY:   Stroke    COMPARISON:      TECHNIQUE:    MRA of the neck was obtained with axial time of flight, with the  time of flight images reprojected into multiple planes using MIPS  reprojection and viewed in cine mode.    FINDINGS:     RIGHT SUBCLAVIAN AND INNOMINATE ARTERY: No stenosis or  dissection.    RIGHT VERTEBRAL ARTERY:  No stenosis or dissection. Right  vertebral artery is dominant.    RIGHT COMMON CAROTID ARTERY:  No stenosis or dissection.    RIGHT INTERNAL CAROTID ARTERY:  There is less than 50% of  diameter narrowing at the origin which is not hemodynamically  significant.    RIGHT EXTERNAL CAROTID ARTERY:  No stenosis or dissection.    LEFT COMMON CAROTID ARTERY:  No stenosis or dissection.    LEFT INTERNAL CAROTID ARTERY:  There is 50-69% of diameter  narrowing proximally at the origin of left ICA.    LEFT EXTERNAL CAROTID ARTERY:  No stenosis or dissection.    LEFT VERTEBRAL ARTERY:  No stenosis or dissection.    LEFT SUBCLAVIAN ARTERY:  No stenosis or dissection.      Impression:      There is 50-69% of diameter narrowing proximally at the origin of  left ICA.  No evidence of hemodynamically significant stenosis of the right  proximal ICA.  The right vertebral artery is dominant.    Electronically signed by:  Dax Thurston MD  2/10/2022 8:21 PM CST  Workstation: [id number]    MRI Angiogram Head Without Contrast [[id number]] Collected: 02/10/22 1747     Updated: 02/10/22 1853    Narrative:      EXAM:  MR BRAIN ANGIOGRAPHY WITHOUT IV CONTRAST    ORDERING PROVIDER:  JAMILAH UREÑA    CLINICAL HISTORY:  Stroke    COMPARISON:       TECHNIQUE:    Time-of-flight MRA of the brain was performed. Images were  reprojected into multiple planes using MIPS reprojections and  viewed in cine mode.    FINDINGS:     INTERNAL CAROTID ARTERIES: Normal course and caliber. No aneurysm  or significant stenosis.    ANTERIOR COMMUNICATING ARTERY: Patent.    POSTERIOR COMMUNICATING ARTERIES: Patent.    ANTERIOR, MIDDLE AND POSTERIOR CEREBRAL ARTERIES: Normal course  and caliber. No aneurysm or significant stenosis.    VERTEBRAL BASILAR SYSTEM: Distal vertebral arteries are  codominant and patent. Basilar artery is normal caliber and  patent. Anterior inferior, posterior inferior and superior  cerebellar arteries are unremarkable.  No aneurysm.      Impression:      No evidence of hemodynamically significant stenosis of the  visualized intracranial arteries.  No evidence of aneurysm within the [place].    Electronically signed by:  Dax Thurston MD  2/10/2022 6:51 PM CST  Workstation: [id number]    MRI Brain Without Contrast [[id number]] Collected: 02/10/22 1734     Updated: 02/10/22 1850    Narrative:      EXAM:  MR BRAIN WITHOUT IV CONTRAST    ORDERING PROVIDER:  JAMILAH UREÑA    CLINICAL HISTORY:   Stroke    COMPARISON:   CT scan of the brain on the same date    TECHNIQUE:   Axial T1-weighted, T2-weighted and diffusion weighted, FLAIR, and  sagittal T1-weighted images,  T2 gradient echo as well as axial  and coronal T1 weight images with no IV contrast were obtained  using a 1.5 Laury MRI machine.    FINDINGS:    CEREBRAL PARENCHYMA: Scattered T2 and FLAIR hyperintensities.  These are consistent with chronic microangiopathic changes. No  encephalomalacia, mass or gliosis. Mild age dependent cerebral  atrophy.    POSTERIOR FOSSA: No mass or abnormal signal. Unremarkable  craniocervical junction.    EXTRA-AXIAL SPACES: No mass. Unremarkable contour.    PITUITARY: No mass or parasellar abnormality.    ORBITS: No mass. Unremarkable extraocular muscles, globe and  optic  nerve.    CALVARIA AND SOFT TISSUES:  No mass, adenopathy, or abnormal  signal.    TEMPORAL BONE AND SKULL BASE: Unremarkable mastoid air cells,  inner and middle ear.    PARANASAL SINUSES: Unremarkable.    VASCULAR STRUCTURES: Normal flow voids are present..    DIFFUSION WEIGHTED IMAGES: Acute restricted diffusion in the left  edis measuring 1 x 0.8 cm in cross-section.    SUSCEPTIBILITY SEQUENCE: No susceptibility artifact.      Impression:      Acute ischemia in the left edis measuring 1 x 0.8 cm in  cross-section.  Age-appropriate cerebral atrophy  Chronic microangiopathic changes.  No evidence of acute intracranial hemorrhage.    Electronically signed by:  Dax Thurston MD  2/10/2022 6:48 PM CST  Workstation: [id number]          Assessment/Plan     Hospital Problem List:  Principal Problem:    Acute CVA (cerebrovascular accident) (HCC)  Active Problems:    Numbness  Congestive heart failure status post heart transplant 20 years ago on immunosuppressant therapy  History of orthostatic hypotension  CKD stage III  Type 2 diabetes mellitus  Essential hypertension  GERD  Obstructive sleep apnea with CPAP     Plan  -Neurology input appreciated  -Patient continues to have some right arm and leg numbness but has no motor deficits at this time  .PT/OT consultation input appreciated.  Continue aspirin 81 mg daily, Plavix 74 mg daily x3 months then stop Plavix and take aspirin 325 mg daily.  Lipitor 80 mg daily  -Echocardiogram showed no intracardiac thrombus and normal EF  -Blood  pressure is elevated.  Will restart home medication of p.o. amlodipine 10 mg daily.  Continue other antihypertensive medications.  Start IV hydralazine 20 mg every 6 hours for systolic blood pressure greater than 160 mmHg  -Continue home medications for s/p cardiac transplant.    -Continue NovoLog sliding scale for type 2 diabetes mellitus.  Reduce Levemir dose to 13 units daily due to a.m. hypoglycemia     DVT prophylaxis with SCDs  CODE STATUS is  full code      Discharge Planning: I expect patient to be discharged in the next 24 hours    I confirmed that the patient's Advance Care Plan is present, code status is documented, or surrogate decision maker is listed in the patient's medical record.      I have utilized all available immediate resources to obtain, update, or review the patient's current medications.      Samantha Ko MD   02/11/22   17:41 CST

## 2022-02-11 NOTE — THERAPY EVALUATION
Patient Name: Román Corey  : 1945    MRN: [id number]                              Today's Date: 2022       Admit Date: 2/10/2022    Visit Dx:     ICD-10-CM ICD-9-CM   1. Numbness  R20.0 782.0   2. Dysphagia, unspecified type  R13.10 787.20   3. Impaired mobility and ADLs  Z74.09 V49.89    Z78.9    4. Impaired functional mobility, balance, gait, and endurance  Z74.09 V49.89     Patient Active Problem List   Diagnosis   • Near syncope   • Orthostatic dizziness   • SHAQ (acute kidney injury) ([facility])   • Numbness   • Acute CVA (cerebrovascular accident) ([facility])     Past Medical History:   Diagnosis Date   • Chronic kidney disease    • Diabetes mellitus ([facility])    • GERD (gastroesophageal reflux disease)    • HFrEF (heart failure with reduced ejection fraction) ([facility])    • Hypertension      Past Surgical History:   Procedure Laterality Date   • HEART TRANSPLANT     • HERNIA REPAIR        General Information     Row Name 22 0850          Physical Therapy Time and Intention    Document Type evaluation  -KW     Mode of Treatment co-treatment; occupational therapy; physical therapy  -KW     Row Name 22 0850          General Information    Patient Profile Reviewed yes  -KW     Prior Level of Function independent:; gait; transfer; ADL's; all household mobility; community mobility  -KW     Existing Precautions/Restrictions fall  -KW     Row Name 22 0850          Living Environment    Lives With spouse  -KW     Row Name 22 0850          Home Main Entrance    Number of Stairs, Main Entrance none  -KW     Row Name 22 0850          Stairs Within Home, Primary    Stairs, Within Home, Primary ramp to enter home with B HR; tub/shower  -KW     Number of Stairs, Within Home, Primary none  -KW     Row Name 22 0850          Cognition    Orientation Status (Cognition) oriented x 4  -KW     Row Name 22 0850          Safety Issues, Functional Mobility    Safety Issues Affecting Function  (Mobility) impulsivity; safety precautions follow-through/compliance  -KW     Impairments Affecting Function (Mobility) balance; endurance/activity tolerance  -KW           User Key  (r) = Recorded By, (t) = Taken By, (c) = Cosigned By    Initials Name Provider Type    KW Adore Ybarra PT Physical Therapist               Mobility     Row Name 02/11/22 1029          Bed Mobility    Bed Mobility supine-sit  -KW     Supine-Sit [place] (Bed Mobility) supervision  -KW     Comment (Bed Mobility) pt up in chair at end of eval  -KW     Row Name 02/11/22 1029          Bed-Chair Transfer    Bed-Chair [place] (Transfers) contact guard  -KW     Assistive Device (Bed-Chair Transfers) walker, front-wheeled  -KW     Row Name 02/11/22 1029          Sit-Stand Transfer    Sit-Stand [place] (Transfers) contact guard  -KW     Assistive Device (Sit-Stand Transfers) walker, front-wheeled  -KW     Row Name 02/11/22 1029          Gait/Stairs (Locomotion)    [place] Level (Gait) contact guard  -KW     Assistive Device (Gait) walker, front-wheeled  -KW     Distance in Feet (Gait) 25ft x2  -KW     Deviations/Abnormal Patterns (Gait) gait speed decreased; stride length decreased  -KW           User Key  (r) = Recorded By, (t) = Taken By, (c) = Cosigned By    Initials Name Provider Type    Adore Garces PT Physical Therapist               Obj/Interventions     Row Name 02/11/22 1029          Range of Motion Comprehensive    Comment, General Range of Motion BLE AROM WFL  -KW     Row Name 02/11/22 1029          Strength Comprehensive (MMT)    Comment, General Manual Muscle Testing (MMT) Assessment BLE grossly 4+/5  -KW     Row Name 02/11/22 1029          Balance    Balance Assessment sitting static balance  -KW     Static Sitting Balance WFL; unsupported; sitting, edge of bed  -KW     Row Name 02/11/22 1029          Sensory Assessment (Somatosensory)    Sensory Assessment (Somatosensory) left-sided sensation intact  RLE  pt reporting numbness and tingling  -KW           User Key  (r) = Recorded By, (t) = Taken By, (c) = Cosigned By    Initials Name Provider Type    Adore Garces, PT Physical Therapist               Goals/Plan     Row Name 02/11/22 1332          Bed Mobility Goal 1 (PT)    Activity/Assistive Device (Bed Mobility Goal 1, PT) sit to supine; supine to sit  -KW     [place] Level/Cues Needed (Bed Mobility Goal 1, PT) independent  -KW     Time Frame (Bed Mobility Goal 1, PT) 2 days  -KW     Progress/Outcomes (Bed Mobility Goal 1, PT) goal not met  -KW     Row Name 02/11/22 1332          Transfer Goal 1 (PT)    Activity/Assistive Device (Transfer Goal 1, PT) sit-to-stand/stand-to-sit; bed-to-chair/chair-to-bed; walker, rolling  -KW     [place] Level/Cues Needed (Transfer Goal 1, PT) modified independence  -KW     Time Frame (Transfer Goal 1, PT) 3 days  -KW     Progress/Outcome (Transfer Goal 1, PT) goal not met  -KW     Row Name 02/11/22 1332          Gait Training Goal 1 (PT)    Activity/Assistive Device (Gait Training Goal 1, PT) gait (walking locomotion); assistive device use; decrease fall risk; increase endurance/gait distance; walker, rolling  -KW     [place] Level (Gait Training Goal 1, PT) modified independence  -KW     Distance (Gait Training Goal 1, PT) 150ft or more  -KW     Time Frame (Gait Training Goal 1, PT) by discharge  -KW     Progress/Outcome (Gait Training Goal 1, PT) goal not met  -KW           User Key  (r) = Recorded By, (t) = Taken By, (c) = Cosigned By    Initials Name Provider Type    Adore Garces, PT Physical Therapist               Clinical Impression     Row Name 02/11/22 1331          Pain Scale: Numbers Pre/Post-Treatment    Pretreatment Pain Rating 0/10 - no pain  -KW     Posttreatment Pain Rating 0/10 - no pain  -KW     Row Name 02/11/22 1331          Plan of Care Review    Plan of Care Reviewed With patient  -KW     Row Name 02/11/22 1331          Therapy  Assessment/Plan (PT)    Predicted Duration of Therapy Intervention (PT) until goals met or d/c from acute care  -KW     Row Name 02/11/22 1331          Vital Signs    Pre Systolic BP Rehab 141  -KW     Pre Treatment Diastolic BP 69  -KW     Pretreatment Heart Rate (beats/min) 92  -KW     Pre SpO2 (%) 99  -KW     O2 Delivery Pre Treatment room air  -KW     Pre Patient Position Sitting  -KW     Post Patient Position Sitting  -KW     Row Name 02/11/22 1331          Positioning and Restraints    Pre-Treatment Position sitting in chair/recliner  -KW     Post Treatment Position chair  -KW     In Chair notified nsg; call light within reach; encouraged to call for assist; reclined; with family/caregiver  -KW           User Key  (r) = Recorded By, (t) = Taken By, (c) = Cosigned By    Initials Name Provider Type    KW Adore Ybarra, PT Physical Therapist               Outcome Measures     Row Name 02/11/22 1332          How much help from another person do you currently need...    Turning from your back to your side while in flat bed without using bedrails? 3  -KW     Moving from lying on back to sitting on the side of a flat bed without bedrails? 3  -KW     Moving to and from a bed to a chair (including a wheelchair)? 3  -KW     Standing up from a chair using your arms (e.g., wheelchair, bedside chair)? 3  -KW     Climbing 3-5 steps with a railing? 2  -KW     To walk in hospital room? 3  -KW     AM-PAC 6 Clicks Score (PT) 17  -KW     Row Name 02/11/22 1332 02/11/22 0955       Modified [place] Scale    Pre-Stroke Modified [facility] Scale 0 - No Symptoms at all.  -KW 0 - No Symptoms at all.  -ME    Modified [place] Scale 4 - Moderately severe disability.  Unable to walk without assistance, and unable to attend to own bodily needs without assistance.  -KW 4 - Moderately severe disability.  Unable to walk without assistance, and unable to attend to own bodily needs without assistance.  -ME    Row Name 02/11/22 1332 02/11/22 0955        Functional Assessment    Outcome Measure Options AM-PAC 6 Clicks Basic Mobility (PT); Modified [place]  -KW AM-PAC 6 Clicks Daily Activity (OT); Modified [place]  -ME          User Key  (r) = Recorded By, (t) = Taken By, (c) = Cosigned By    Initials Name Provider Type    Adore Garces PT Physical Therapist    ME Laurel Eckert, OTR/L Occupational Therapist                             Physical Therapy Education                 Title: PT OT SLP Therapies (In Progress)     Topic: Physical Therapy (In Progress)     Point: Mobility training (Done)     Learning Progress Summary           Patient Acceptance, E, VU by LOLI at 2/11/2022 1333    Comment: Role of PT, POC, use of gait belt                   Point: Home exercise program (Not Started)     Learner Progress:  Not documented in this visit.          Point: Body mechanics (Not Started)     Learner Progress:  Not documented in this visit.          Point: Precautions (Done)     Learning Progress Summary           Patient Acceptance, E, VU by LOLI at 2/11/2022 1333    Comment: Role of PT, POC, use of gait belt                               User Key     Initials Effective Dates Name Provider Type Discipline    LOLI 06/16/21 -  Adore Ybarra PT Physical Therapist PT              PT Recommendation and Plan  Planned Therapy Interventions (PT): balance training, bed mobility training, gait training, home exercise program, transfer training, stretching, strengthening, stair training, patient/family education  Plan of Care Reviewed With: patient  Outcome Summary: PT evaluation completed as co-eval with OT. Pt pleasant and agreeable to eval. Pt with fall ~3 weeks ago that resulted in tib/fib fx of LLE; pt reports he has been WBAT with rollator at home and wears knee brace for support. Pt performing supine>sit with SBA and sitting EOB WFL. Pt demoing equal BLE stregnth, however continues to report increased numbness/ tingling in RLE. Pt performing sit<>stand with CGA And  ambulating 25ft x2 with CGA And FWW. Pt would benefit from further skilled PT to increase functional mobility, endurance, strength, safety, balance, and to progress towards PLOF. Upon d/c from acute care, recommend home with assist PRN and HHPT.     Time Calculation:    PT Charges     Row Name 02/11/22 1336             Time Calculation    Start Time 0950  -KW      Stop Time 1033  -KW      Time Calculation (min) 43 min  -KW      PT Received On 02/11/22  -KW      PT Goal Re-Cert Due Date 02/24/22  -KW            User Key  (r) = Recorded By, (t) = Taken By, (c) = Cosigned By    Initials Name Provider Type    Adore Garces, PT Physical Therapist              Therapy Charges for Today     Code Description Service Date Service Provider Modifiers Qty    22478919019 HC PT EVAL MOD COMPLEXITY 3 2/11/2022 Adore Ybarra, PT GP 1          PT G-Codes  Outcome Measure Options: AM-PAC 6 Clicks Basic Mobility (PT), Modified [place]  AM-PAC 6 Clicks Score (PT): 17  AM-PAC 6 Clicks Score (OT): 20  Modified [place] Scale: 4 - Moderately severe disability.  Unable to walk without assistance, and unable to attend to own bodily needs without assistance.    Adore Ybarra PT  2/11/2022

## 2022-02-11 NOTE — CONSULTS
"Adult Nutrition  Assessment    Patient Name:  Román Corey  YOB: 1945  MRN: [id number]  Admit Date:  2/10/2022    Assessment Date:  2/11/2022    Comments:  RD consulted for diet ed r/t pt w/A1c 10. Pt admit r/t stroke symptoms. Pt says he monitors Bg closely at home but also says he doesn't follow any particular diet. RD advised pt of A1c 10 and he seems to understand that is high. RD reviewed ADA diet recs and provided handouts. RD not really able to gauge pt's level of understanding or willingness to follow diet, he was just very agreeable w/everything we discussed. Will monitor course.       Reason for Assessment     Row Name 02/11/22 1337          Reason for Assessment    Reason For Assessment nurse/nurse practitioner consult     Diagnosis neurologic conditions     Identified At Risk by Screening Criteria need for education                  Anthropometrics     Row Name 02/11/22 0952          Anthropometrics    Height 170.2 cm (67.01\")     Weight 83.9 kg (184 lb 15.5 oz)            Ideal Body Weight (IBW)    Ideal Body Weight (IBW) (kg) 68.12     % Ideal Body Weight 123.16            Body Mass Index (BMI)    BMI (kg/m2) 29.02                Labs/Tests/Procedures/Meds     Row Name 02/11/22 1337          Labs/Procedures/Meds    Lab Results Reviewed reviewed, pertinent     Lab Results Comments Gl 263, BUN 36, crea 1.93, triglycerides 164, A1c 10.3            Diagnostic Tests/Procedures    Diagnostic Test/Procedure Reviewed reviewed            Medications    Pertinent Medications Reviewed reviewed                  Estimated/Assessed Needs     Row Name 02/11/22 0952          Calculation Measurements    Height 170.2 cm (67.01\")                Nutrition Prescription Ordered     Row Name 02/11/22 1337          Nutrition Prescription PO    Current PO Diet Regular     Common Modifiers Cardiac; Consistent Carbohydrate                       Electronically signed by:  Lora Haile RD  02/11/22 13:41 " CST

## 2022-02-11 NOTE — THERAPY DISCHARGE NOTE
Acute Care - Speech Language Pathology   Swallow Initial Evaluation/Discharge [facility]     Patient Name: Román Corey  : 1945  MRN: [id number]  Today's Date: 2022               Admit Date: 2/10/2022     Pt seen for skilled ST services this date to assess swallow function.  Pt seen w/noon meal and was able to self feed safely.  Pt consumed regular (chicken breast), mech soft (rice, mixed vegetables), and puree w/efficient mastication, good oral clearance, and timely AP transit.  Pt consumed 240 cc of thin liquids via straw w/no overt s/s of aspiration.  SLP recommends continued current diet.  Skilled ST services not recommended at this time.  SLP discussed results and recommendations w/pt who was in agreement.    Visit Dx:    ICD-10-CM ICD-9-CM   1. Numbness  R20.0 782.0   2. Dysphagia, unspecified type  R13.10 787.20     Patient Active Problem List   Diagnosis   • Near syncope   • Orthostatic dizziness   • SHAQ (acute kidney injury) ([facility])   • Numbness   • Acute CVA (cerebrovascular accident) ([facility])     Past Medical History:   Diagnosis Date   • Chronic kidney disease    • Diabetes mellitus ([facility])    • GERD (gastroesophageal reflux disease)    • HFrEF (heart failure with reduced ejection fraction) ([facility])    • Hypertension      Past Surgical History:   Procedure Laterality Date   • HEART TRANSPLANT     • HERNIA REPAIR         SLP Recommendation and Plan  SLP Swallowing Diagnosis: swallow WFL (22)  SLP Diet Recommendation: regular textures, thin liquids (22)     Monitor for Signs of Aspiration: notify SLP if any concerns (22)  Recommended Diagnostics: No further SLP services recommended (22)  Swallow Criteria for Skilled Therapeutic Interventions Met: no problems identified which require skilled intervention (22)  Anticipated Discharge Disposition (SLP): home (22 1237)     Therapy Frequency (Swallow): evaluation only (22)               Anticipated Discharge Disposition (SLP): home (02/11/22 1237)        Reason for Discharge: other (see comments) (Eval only) (02/11/22 1237)             Plan of Care Reviewed With: patient (02/11/22 1237)  Outcome Summary: Pt seen for skilled ST services this date to assess swallow function.  Pt seen w/noon meal and was able to self feed safely.  Pt consumed regular (chicken breast), mech soft (rice, mixed vegetables), and puree w/efficient mastication, good oral clearance, and timely AP transit.  Pt consumed 240 cc of thin liquids via straw w/no overt s/s of aspiration.  SLP recommends continued current diet.  Skilled ST services not recommended at this time.  SLP discussed results and recommendations w/pt who was in agreement. (02/11/22 1237)    SWALLOW EVALUATION (last 72 hours)     SLP Adult Swallow Evaluation     Row Name 02/11/22 1214                   Rehab Evaluation    Document Type evaluation; discharge evaluation/summary  -CK        Total Evaluation Minutes, SLP 23  -CK        Subjective Information no complaints  -CK        Patient Observations alert; cooperative; agree to therapy  -CK        Patient/Family/Caregiver Comments/Observations No family present at bedside  -CK        Patient Effort good  -CK                  General Information    Patient Profile Reviewed yes  -CK        Pertinent History Of Current Problem Pt admitted w/stroke like symptoms; denies any dysphagia or hx of dysphagia  -CK        Current Method of Nutrition regular textures; thin liquids  -CK        Precautions/Limitations, Vision WFL with corrective lenses  -CK        Precautions/Limitations, Hearing WFL  -CK        Prior Level of Function-Communication WFL  -CK        Prior Level of Function-Swallowing no diet consistency restrictions  -CK                  Pain    Additional Documentation Pain Scale: Numbers Pre/Post-Treatment (Group)  -CK                  Pain Scale: Numbers Pre/Post-Treatment    Pretreatment Pain  Rating 0/10 - no pain  -CK        Posttreatment Pain Rating 0/10 - no pain  -CK                  Oral Motor Structure and Function    Dentition Assessment natural, present and adequate  -CK        Secretion Management WNL/WFL  -CK        Mucosal Quality moist, healthy  -CK        Volitional Swallow WFL  -CK        Volitional Cough WFL  -CK                  General Eating/Swallowing Observations    Respiratory Support Currently in Use room air  -CK        Eating/Swallowing Skills self-fed; appropriate self-feeding skills observed  -CK        Positioning During Eating upright 90 degree; upright in bed  -CK        Utensils Used straw  -CK        Consistencies Trialed regular textures; soft textures; pureed; thin liquids  -CK                  Clinical Swallow Eval    Oral Prep Phase WFL  -CK        Oral Transit WFL  -CK        Oral Residue WFL  -CK        Pharyngeal Phase WFL  -CK        Esophageal Phase unremarkable  -CK        Clinical Swallow Evaluation Summary Pt seen for skilled ST services this date to assess swallow function.  Pt seen w/noon meal and was able to self feed safely.  Pt consumed regular (chicken breast), mech soft (rice, mixed vegetables), and puree w/efficient mastication, good oral clearance, and timely AP transit.  Pt consumed 240 cc of thin liquids via straw w/no overt s/s of aspiration.  SLP recommends continued current diet.  Skilled ST services not recommended at this time.  SLP discussed results and recommendations w/pt who was in agreement.  -CK                  SLP Evaluation Clinical Impression    SLP Swallowing Diagnosis swallow WFL  -CK        Functional Impact no impact on function  -CK        Swallow Criteria for Skilled Therapeutic Interventions Met no problems identified which require skilled intervention  -CK                  Recommendations    Therapy Frequency (Swallow) evaluation only  -CK        SLP Diet Recommendation regular textures; thin liquids  -CK        Recommended  Diagnostics No further SLP services recommended  -CK        SLP Rec. for Method of Medication Administration as tolerated  -CK        Monitor for Signs of Aspiration notify SLP if any concerns  -CK        Anticipated Discharge Disposition (SLP) home  -CK              User Key  (r) = Recorded By, (t) = Taken By, (c) = Cosigned By    Initials Name Effective Dates    Jennifer Michael MS CCC-SLP 06/16/21 -                 EDUCATION  The patient has been educated in the following areas:   Results and recommendaitons.                 Time Calculation:    Time Calculation- SLP     Row Name 02/11/22 1237             Time Calculation- SLP    SLP Start Time 1214  -CK      SLP Stop Time 1237  -CK      SLP Time Calculation (min) 23 min  -CK      Total Timed Code Minutes- SLP 23 minute(s)  -CK      SLP Received On 02/11/22  -CK              Untimed Charges    SLP Eval/Re-eval  ST Eval Oral Pharyng Swallow - 44251  -CK      [id number] Eval Oral Pharyng Swallow Minutes 23  -CK              Total Minutes    Untimed Charges Total Minutes 23  -CK       Total Minutes 23  -CK            User Key  (r) = Recorded By, (t) = Taken By, (c) = Cosigned By    Initials Name Provider Type    Jennifer Michael MS CCC-SLP Speech and Language Pathologist                Therapy Charges for Today     Code Description Service Date Service Provider Modifiers Qty    06869487673 HC ST EVAL ORAL PHARYNG SWALLOW 2 2/11/2022 Jennifer Arriaga MS CCC-SLP GN 1               SLP Discharge Summary  Anticipated Discharge Disposition (SLP): home  Reason for Discharge: other (see comments) (Eval only)    Jennifer Arriaga MS CCC-SLP  2/11/2022

## 2022-02-11 NOTE — PLAN OF CARE
Goal Outcome Evaluation:  Plan of Care Reviewed With: patient           Outcome Summary: Pt seen for skilled ST services this date to assess swallow function.  Pt seen w/noon meal and was able to self feed safely.  Pt consumed regular (chicken breast), mech soft (rice, mixed vegetables), and puree w/efficient mastication, good oral clearance, and timely AP transit.  Pt consumed 240 cc of thin liquids via straw w/no overt s/s of aspiration.  SLP recommends continued current diet.  Skilled ST services not recommended at this time.  SLP discussed results and recommendations w/pt who was in agreement.

## 2022-02-11 NOTE — PLAN OF CARE
Goal Outcome Evaluation:  Plan of Care Reviewed With: patient           Outcome Summary: PT evaluation completed as co-eval with OT. Pt pleasant and agreeable to eval. Pt with fall ~3 weeks ago that resulted in tib/fib fx of LLE; pt reports he has been WBAT with rollator at home and wears knee brace for support. Pt performing supine>sit with SBA and sitting EOB WFL. Pt demoing equal BLE stregnth, however continues to report increased numbness/ tingling in RLE. Pt performing sit<>stand with CGA And ambulating 25ft x2 with CGA And FWW. Pt would benefit from further skilled PT to increase functional mobility, endurance, strength, safety, balance, and to progress towards PLOF. Upon d/c from acute care, recommend home with assist PRN and HHPT.

## 2022-02-11 NOTE — THERAPY EVALUATION
Patient Name: Román Corey  : 1945    MRN: [id number]                              Today's Date: 2022       Admit Date: 2/10/2022    Visit Dx:     ICD-10-CM ICD-9-CM   1. Numbness  R20.0 782.0   2. Dysphagia, unspecified type  R13.10 787.20   3. Impaired mobility and ADLs  Z74.09 V49.89    Z78.9      Patient Active Problem List   Diagnosis   • Near syncope   • Orthostatic dizziness   • SHAQ (acute kidney injury) ([facility])   • Numbness   • Acute CVA (cerebrovascular accident) ([facility])     Past Medical History:   Diagnosis Date   • Chronic kidney disease    • Diabetes mellitus ([facility])    • GERD (gastroesophageal reflux disease)    • HFrEF (heart failure with reduced ejection fraction) ([facility])    • Hypertension      Past Surgical History:   Procedure Laterality Date   • HEART TRANSPLANT     • HERNIA REPAIR        General Information     Row Name 22 0955          OT Time and Intention    Document Type evaluation  -ME     Mode of Treatment co-treatment; occupational therapy; physical therapy  -ME     Row Name 2255          General Information    Patient Profile Reviewed yes  -ME     Prior Level of Function all household mobility; community mobility; ADL's  -ME     Existing Precautions/Restrictions fall  -ME     Row Name 2255          Living Environment    Lives With spouse  -ME     Row Name 2255          Stairs Within Home, Primary    Stairs, Within Home, Primary ramp to enter; tub shower; ambulates with a rollator at home; shower chair  -ME     Number of Stairs, Within Home, Primary none  -ME     Row Name 2255          Cognition    Orientation Status (Cognition) oriented x 4  -ME     Row Name 2255          Safety Issues, Functional Mobility    Safety Issues Affecting Function (Mobility) safety precaution awareness; safety precautions follow-through/compliance; awareness of need for assistance  -ME     Impairments Affecting Function (Mobility) balance;  endurance/activity tolerance; strength  -ME           User Key  (r) = Recorded By, (t) = Taken By, (c) = Cosigned By    Initials Name Provider Type    Laurel Yuan OTR/L Occupational Therapist                 Mobility/ADL's     Row Name 02/11/22 0955          Bed Mobility    Bed Mobility supine-sit  -ME     Supine-Sit [place] (Bed Mobility) supervision  -ME     Comment (Bed Mobility) up in chair at end of eval  -ME     Row Name 02/11/22 0955          Transfers    Transfers sit-stand transfer; bed-chair transfer; toilet transfer  -ME     Bed-Chair [place] (Transfers) contact guard  -ME     Assistive Device (Bed-Chair Transfers) walker, front-wheeled  -ME     Sit-Stand [place] (Transfers) contact guard  -ME     [place] Level (Toilet Transfer) contact guard  -ME     Assistive Device (Toilet Transfer) walker, front-wheeled; grab bars/safety frame  -ME     Row Name 02/11/22 0955          Sit-Stand Transfer    Assistive Device (Sit-Stand Transfers) walker, front-wheeled  -ME     Row Name 02/11/22 0955          Toilet Transfer    Type (Toilet Transfer) sit-stand; stand-sit  -ME     Row Name 02/11/22 0955          Functional Mobility    Functional Mobility- Ind. Level contact guard assist  -ME     Functional Mobility- Device rolling walker  -ME     Functional Mobility- Comment bilateral hand held assistance from bed to stretcher;  -ME     Row Name 02/11/22 0955          Activities of Daily Living    BADL Assessment/Intervention toileting; grooming  -ME     Row Name 02/11/22 0955          Toileting Assessment/Training    [place] Level (Toileting) toileting skills; contact guard assist; supervision  -ME     Row Name 02/11/22 0955          Grooming Assessment/Training    [place] Level (Grooming) wash face, hands; contact guard assist  -ME     Position (Grooming) sink side  -ME           User Key  (r) = Recorded By, (t) = Taken By, (c) = Cosigned By    Initials Name Provider Type    JAELYN Eckert  Laurel OTR/L Occupational Therapist               Obj/Interventions     Row Name 02/11/22 0955          Sensory Assessment (Somatosensory)    Sensory Assessment (Somatosensory) other (see comments)  -ME     Row Name 02/11/22 0955          Sensory Interventions    Comment, Sensory Intervention numbness and tingling in the RUE, all other UE sensation WFL  -[facility] Name 02/11/22 0955          Range of Motion Comprehensive    General Range of Motion no range of motion deficits identified; bilateral upper extremity ROM [facility]  -[facility] Name 02/11/22 0955          Strength Comprehensive (MMT)    General Manual Muscle Testing (MMT) Assessment other (see comments)  -ME     Comment, General Manual Muscle Testing (MMT) Assessment BUE strength and bilateral  strength are grossly 4+/5; pt is right handed; equal strength both sides  -ME           User Key  (r) = Recorded By, (t) = Taken By, (c) = Cosigned By    Initials Name Provider Type    ME Laurel Eckert OTR/L Occupational Therapist               Goals/Plan     Row Name 02/11/22 0955          Transfer Goal 1 (OT)    Activity/Assistive Device (Transfer Goal 1, OT) toilet  -ME     [place] Level/Cues Needed (Transfer Goal 1, OT) modified independence  -ME     Time Frame (Transfer Goal 1, OT) long term goal (LTG); by discharge  -ME     Progress/Outcome (Transfer Goal 1, OT) goal not met  -ME     Row Name 02/11/22 0955          Bathing Goal 1 (OT)    Activity/Device (Bathing Goal 1, OT) lower body bathing  AD as needed  -ME     [place] Level/Cues Needed (Bathing Goal 1, OT) modified independence  -ME     Time Frame (Bathing Goal 1, OT) long term goal (LTG); by discharge  -ME     Progress/Outcomes (Bathing Goal 1, OT) goal not met  -[facility] Name 02/11/22 0955          Dressing Goal 1 (OT)    Activity/Device (Dressing Goal 1, OT) lower body dressing  AD as needed  -ME     [place]/Cues Needed (Dressing Goal 1, OT) modified independence  -ME     Time  Frame (Dressing Goal 1, OT) long term goal (LTG); by discharge  -ME     Progress/Outcome (Dressing Goal 1, OT) goal not met  -ME     Row Name 02/11/22 0955          Therapy Assessment/Plan (OT)    Planned Therapy Interventions (OT) activity tolerance training; adaptive equipment training; BADL retraining; functional balance retraining; IADL retraining; occupation/activity based interventions; patient/caregiver education/training; ROM/therapeutic exercise; strengthening exercise; transfer/mobility retraining; neuromuscular control/coordination retraining  -ME           User Key  (r) = Recorded By, (t) = Taken By, (c) = Cosigned By    Initials Name Provider Type    ME Laurel Eckert OTR/L Occupational Therapist               Clinical Impression     Row Name 02/11/22 0955          Pain Assessment    Additional Documentation Pain Scale: Numbers Pre/Post-Treatment (Group)  -ME     Row Name 02/11/22 0955          Pain Scale: Numbers Pre/Post-Treatment    Pretreatment Pain Rating 0/10 - no pain  -ME     Posttreatment Pain Rating 0/10 - no pain  -ME     Row Name 02/11/22 0955          Plan of Care Review    Plan of Care Reviewed With patient  -ME     Row Name 02/11/22 0955          Therapy Assessment/Plan (OT)    Patient/Family Therapy Goal Statement (OT) to return home  -ME     Rehab Potential (OT) good, to achieve stated therapy goals  -ME     Criteria for Skilled Therapeutic Interventions Met (OT) yes; meets criteria; skilled treatment is necessary  -ME     Therapy Frequency (OT) other (see comments)  3-7 days per week  -ME     Predicted Duration of Therapy Intervention (OT) until d/c or all goals met  -ME     Row Name 02/11/22 0955          Therapy Plan Review/Discharge Plan (OT)    Anticipated Discharge Disposition (OT) home; home with assist; home with home health  -[facility] Name 02/11/22 0955          Vital Signs    Pre Systolic BP Rehab 141  -ME     Pre Treatment Diastolic BP 69  -ME     Pretreatment Heart Rate  (beats/min) 92  -ME     Pre SpO2 (%) 99  -ME     O2 Delivery Pre Treatment room air  -ME     O2 Delivery Post Treatment room air  -ME     Pre Patient Position Sitting  -ME     Post Patient Position Sitting  -ME     Row Name 02/11/22 0955          Positioning and Restraints    Pre-Treatment Position sitting in chair/recliner  -ME     Post Treatment Position chair  -ME     In Chair notified nsg; reclined; call light within reach; encouraged to call for assist  -ME           User Key  (r) = Recorded By, (t) = Taken By, (c) = Cosigned By    Initials Name Provider Type    Laurel Yuan OTR/L Occupational Therapist               Outcome Measures     Row Name 02/11/22 0955          How much help from another is currently needed...    Putting on and taking off regular lower body clothing? 3  -ME     Bathing (including washing, rinsing, and drying) 3  -ME     Toileting (which includes using toilet bed pan or urinal) 3  -ME     Putting on and taking off regular upper body clothing 3  -ME     Taking care of personal grooming (such as brushing teeth) 4  -ME     Eating meals 4  -ME     AM-PAC 6 Clicks Score (OT) 20  -ME     Row Name 02/11/22 0955          Functional Assessment    Outcome Measure Options AM-PAC 6 Clicks Daily Activity (OT)  -ME           User Key  (r) = Recorded By, (t) = Taken By, (c) = Cosigned By    Initials Name Provider Type    Laurel Yuan OTR/L Occupational Therapist                Occupational Therapy Education                 Title: PT OT SLP Therapies (In Progress)     Topic: Occupational Therapy (In Progress)     Point: ADL training (Not Started)     Description:   Instruct learner(s) on proper safety adaptation and remediation techniques during self care or transfers.   Instruct in proper use of assistive devices.              Learner Progress:  Not documented in this visit.          Point: Home exercise program (Not Started)     Description:   Instruct learner(s) on appropriate technique  for monitoring, assisting and/or progressing therapeutic exercises/activities.              Learner Progress:  Not documented in this visit.          Point: Precautions (Done)     Description:   Instruct learner(s) on prescribed precautions during self-care and functional transfers.              Learning Progress Summary           Patient Acceptance, E, VU by ME at 2/11/2022 1028    Comment: Educated on OT and POC. Educated to call for assistance. Educated on safety precautions.                   Point: Body mechanics (Done)     Description:   Instruct learner(s) on proper positioning and spine alignment during self-care, functional mobility activities and/or exercises.              Learning Progress Summary           Patient Acceptance, E, VU by ME at 2/11/2022 1028    Comment: Educated on OT and POC. Educated to call for assistance. Educated on safety precautions.                               User Key     Initials Effective Dates Name Provider Type Discipline    ME 06/16/21 -  Laurel Eckert, OTR/L Occupational Therapist OT              OT Recommendation and Plan  Planned Therapy Interventions (OT): activity tolerance training, adaptive equipment training, BADL retraining, functional balance retraining, IADL retraining, occupation/activity based interventions, patient/caregiver education/training, ROM/therapeutic exercise, strengthening exercise, transfer/mobility retraining, neuromuscular control/coordination retraining  Therapy Frequency (OT): other (see comments) (3-7 days per week)  Plan of Care Review  Plan of Care Reviewed With: patient  Outcome Summary: initial OT evaluation complete. co-eval with PT. pt was pleasant and cooperative throughout. BUE ROM WFL grossly. BUE strength and bilateral  strength are grossly 4+/5. strength equal on both sides. continues to complain of numbness in the RUE. CGA for transfers and mobility with use of RW. supervision for bed mobility. sitting up in chair at end of  session with all needs in reach. recommend further skilled OT services to address functional mobility and ADL deficits, as well as balance, strength, and endurance. recommend home with assistance and home health OT at discharge. goals established.     Time Calculation:    Time Calculation- OT     Row Name 02/11/22 1314             Time Calculation- OT    OT Start Time 0955  initial time in (pt was taken by transport to [facility]): 5525-0018  -ME      OT Stop Time 1033  -ME      OT Time Calculation (min) 38 min  -ME      OT Received On 02/11/22  -ME      OT Goal Re-Cert Due Date 02/24/22  -ME              Untimed Charges    OT Eval/Re-eval Minutes 45  -ME              Total Minutes    Untimed Charges Total Minutes 45  -ME       Total Minutes 45  -ME            User Key  (r) = Recorded By, (t) = Taken By, (c) = Cosigned By    Initials Name Provider Type    ME Laurel Eckert OTR/L Occupational Therapist              Therapy Charges for Today     Code Description Service Date Service Provider Modifiers Qty    19947487940 HC OT EVAL MOD COMPLEXITY 3 2/11/2022 Laurel Eckert OTR/L GO 1               Laurel Eckert OTR/AMI  2/11/2022

## 2022-02-12 VITALS
DIASTOLIC BLOOD PRESSURE: 62 MMHG | RESPIRATION RATE: 16 BRPM | SYSTOLIC BLOOD PRESSURE: 118 MMHG | OXYGEN SATURATION: 95 % | TEMPERATURE: 97.6 F | HEIGHT: 67 IN | HEART RATE: 81 BPM | WEIGHT: 184.97 LBS | BODY MASS INDEX: 29.03 KG/M2

## 2022-02-12 LAB
ANION GAP SERPL CALCULATED.3IONS-SCNC: 11 MMOL/L (ref 5–15)
BASOPHILS # BLD AUTO: 0.03 10*3/MM3 (ref 0–0.2)
BASOPHILS NFR BLD AUTO: 0.6 % (ref 0–1.5)
BUN SERPL-MCNC: 38 MG/DL (ref 8–23)
BUN/CREAT SERPL: 19.9 (ref 7–25)
CALCIUM SPEC-SCNC: 9.1 MG/DL (ref 8.6–10.5)
CHLORIDE SERPL-SCNC: 105 MMOL/L (ref 98–107)
CO2 SERPL-SCNC: 22 MMOL/L (ref 22–29)
CREAT SERPL-MCNC: 1.91 MG/DL (ref 0.76–1.27)
DEPRECATED RDW RBC AUTO: 44 FL (ref 37–54)
EOSINOPHIL # BLD AUTO: 0.17 10*3/MM3 (ref 0–0.4)
EOSINOPHIL NFR BLD AUTO: 3.3 % (ref 0.3–6.2)
ERYTHROCYTE [DISTWIDTH] IN BLOOD BY AUTOMATED COUNT: 15.3 % (ref 12.3–15.4)
GFR SERPL CREATININE-BSD FRML MDRD: 34 ML/MIN/1.73
GLUCOSE BLDC GLUCOMTR-MCNC: 164 MG/DL (ref 70–130)
GLUCOSE BLDC GLUCOMTR-MCNC: 236 MG/DL (ref 70–130)
GLUCOSE SERPL-MCNC: 176 MG/DL (ref 65–99)
HCT VFR BLD AUTO: 29.8 % (ref 37.5–51)
HGB BLD-MCNC: 10 G/DL (ref 13–17.7)
IMM GRANULOCYTES # BLD AUTO: 0.01 10*3/MM3 (ref 0–0.05)
IMM GRANULOCYTES NFR BLD AUTO: 0.2 % (ref 0–0.5)
LYMPHOCYTES # BLD AUTO: 1.99 10*3/MM3 (ref 0.7–3.1)
LYMPHOCYTES NFR BLD AUTO: 39 % (ref 19.6–45.3)
MCH RBC QN AUTO: 26.5 PG (ref 26.6–33)
MCHC RBC AUTO-ENTMCNC: 33.6 G/DL (ref 31.5–35.7)
MCV RBC AUTO: 78.8 FL (ref 79–97)
MONOCYTES # BLD AUTO: 0.42 10*3/MM3 (ref 0.1–0.9)
MONOCYTES NFR BLD AUTO: 8.2 % (ref 5–12)
NEUTROPHILS NFR BLD AUTO: 2.48 10*3/MM3 (ref 1.7–7)
NEUTROPHILS NFR BLD AUTO: 48.7 % (ref 42.7–76)
NRBC BLD AUTO-RTO: 0 /100 WBC (ref 0–0.2)
PLATELET # BLD AUTO: 229 10*3/MM3 (ref 140–450)
PMV BLD AUTO: 9.5 FL (ref 6–12)
POTASSIUM SERPL-SCNC: 4.8 MMOL/L (ref 3.5–5.2)
RBC # BLD AUTO: 3.78 10*6/MM3 (ref 4.14–5.8)
SODIUM SERPL-SCNC: 138 MMOL/L (ref 136–145)
WBC NRBC COR # BLD: 5.1 10*3/MM3 (ref 3.4–10.8)

## 2022-02-12 PROCEDURE — 63710000001 INSULIN DETEMIR PER 5 UNITS: Performed by: INTERNAL MEDICINE

## 2022-02-12 PROCEDURE — G0378 HOSPITAL OBSERVATION PER HR: HCPCS

## 2022-02-12 PROCEDURE — 63710000001 AZATHIOPRINE PER 50 MG: Performed by: INTERNAL MEDICINE

## 2022-02-12 PROCEDURE — 63710000001 CYCLOSPORINE MODIFIED PER 25 MG: Performed by: INTERNAL MEDICINE

## 2022-02-12 PROCEDURE — 82962 GLUCOSE BLOOD TEST: CPT

## 2022-02-12 PROCEDURE — 80048 BASIC METABOLIC PNL TOTAL CA: CPT | Performed by: INTERNAL MEDICINE

## 2022-02-12 PROCEDURE — 63710000001 INSULIN ASPART PER 5 UNITS: Performed by: INTERNAL MEDICINE

## 2022-02-12 PROCEDURE — 97535 SELF CARE MNGMENT TRAINING: CPT

## 2022-02-12 PROCEDURE — 85025 COMPLETE CBC W/AUTO DIFF WBC: CPT | Performed by: INTERNAL MEDICINE

## 2022-02-12 RX ORDER — ROSUVASTATIN CALCIUM 20 MG/1
20 TABLET, COATED ORAL NIGHTLY
Qty: 90 TABLET | Refills: 0 | Status: SHIPPED | OUTPATIENT
Start: 2022-02-12

## 2022-02-12 RX ORDER — CLOPIDOGREL BISULFATE 75 MG/1
75 TABLET ORAL DAILY
Qty: 90 TABLET | Refills: 0 | Status: SHIPPED | OUTPATIENT
Start: 2022-02-13 | End: 2022-05-14

## 2022-02-12 RX ORDER — ASPIRIN 81 MG/1
81 TABLET, CHEWABLE ORAL DAILY
Qty: 90 TABLET | Refills: 0 | Status: SHIPPED | OUTPATIENT
Start: 2022-02-12 | End: 2022-05-13

## 2022-02-12 RX ADMIN — INSULIN ASPART 4 UNITS: 100 INJECTION, SOLUTION INTRAVENOUS; SUBCUTANEOUS at 06:49

## 2022-02-12 RX ADMIN — INSULIN ASPART 8 UNITS: 100 INJECTION, SOLUTION INTRAVENOUS; SUBCUTANEOUS at 12:09

## 2022-02-12 RX ADMIN — CLOPIDOGREL BISULFATE 75 MG: 75 TABLET, FILM COATED ORAL at 08:35

## 2022-02-12 RX ADMIN — Medication 1 TABLET: at 08:34

## 2022-02-12 RX ADMIN — ASPIRIN 81 MG: 81 TABLET, CHEWABLE ORAL at 08:35

## 2022-02-12 RX ADMIN — METOPROLOL SUCCINATE 100 MG: 50 TABLET, EXTENDED RELEASE ORAL at 08:36

## 2022-02-12 RX ADMIN — ISOSORBIDE DINITRATE 20 MG: 20 TABLET ORAL at 08:36

## 2022-02-12 RX ADMIN — CALCITRIOL 0.25 MCG: 0.25 CAPSULE ORAL at 08:34

## 2022-02-12 RX ADMIN — AMLODIPINE BESYLATE 10 MG: 10 TABLET ORAL at 08:36

## 2022-02-12 RX ADMIN — GABAPENTIN 200 MG: 100 CAPSULE ORAL at 08:36

## 2022-02-12 RX ADMIN — AZATHIOPRINE 100 MG: 50 TABLET ORAL at 08:37

## 2022-02-12 RX ADMIN — CYCLOSPORINE 75 MG: 25 CAPSULE, LIQUID FILLED ORAL at 08:37

## 2022-02-12 RX ADMIN — SODIUM CHLORIDE, PRESERVATIVE FREE 10 ML: 5 INJECTION INTRAVENOUS at 08:43

## 2022-02-12 RX ADMIN — FUROSEMIDE 40 MG: 40 TABLET ORAL at 08:36

## 2022-02-12 RX ADMIN — Medication 1 TABLET: at 08:36

## 2022-02-12 RX ADMIN — INSULIN DETEMIR 30 UNITS: 100 INJECTION, SOLUTION SUBCUTANEOUS at 08:41

## 2022-02-12 RX ADMIN — TERAZOSIN HYDROCHLORIDE 5 MG: 5 CAPSULE ORAL at 08:35

## 2022-02-12 NOTE — PLAN OF CARE
Goal Outcome Evaluation:   Patient is open to diabetic education given by staff and reinforced when rounded on. Verbalizes understanding of need to protect himself from falls by using equipment and asking for help with transfers.

## 2022-02-12 NOTE — NURSING NOTE
Patient taken to car via wc by rn, son transported home. All belongings with patient, discharge teaching complete and follow up appointments are to be made by patient Monday, 2/14

## 2022-02-12 NOTE — THERAPY TREATMENT NOTE
Patient Name: Román Corey  : 1945    MRN: [id number]                              Today's Date: 2022       Admit Date: 2/10/2022    Visit Dx:     ICD-10-CM ICD-9-CM   1. Numbness  R20.0 782.0   2. Dysphagia, unspecified type  R13.10 787.20   3. Impaired mobility and ADLs  Z74.09 V49.89    Z78.9    4. Impaired functional mobility, balance, gait, and endurance  Z74.09 V49.89   5. Cerebrovascular accident (CVA), unspecified mechanism ([facility])  I63.9 434.91   6. Benign hypertension  I10 401.1   7. Transplant  Z94.9 V42.9   8. Acute CVA (cerebrovascular accident) ([facility])  I63.9 434.91     Patient Active Problem List   Diagnosis   • Near syncope   • Orthostatic dizziness   • SHAQ (acute kidney injury) ([facility])   • Numbness   • Acute CVA (cerebrovascular accident) ([facility])     Past Medical History:   Diagnosis Date   • Chronic kidney disease    • Diabetes mellitus ([facility])    • GERD (gastroesophageal reflux disease)    • HFrEF (heart failure with reduced ejection fraction) ([facility])    • Hypertension      Past Surgical History:   Procedure Laterality Date   • HEART TRANSPLANT     • HERNIA REPAIR        General Information     Row Name 2251          OT Time and Intention    Document Type therapy note (daily note)  -CS     Mode of Treatment occupational therapy  -CS     Row Name 22          General Information    Patient Profile Reviewed yes  -CS     Existing Precautions/Restrictions fall  -CS     Row Name 22          Cognition    Orientation Status (Cognition) oriented x 4  -CS     Row Name 22          Safety Issues, Functional Mobility    Impairments Affecting Function (Mobility) balance; endurance/activity tolerance; strength  -CS           User Key  (r) = Recorded By, (t) = Taken By, (c) = Cosigned By    Initials Name Provider Type    CS Jennifer Rolle COTA Occupational Therapy Assistant                 Mobility/ADL's     Row Name 22          Bed Mobility    Bed  Mobility supine-sit  -CS     Supine-Sit [place] (Bed Mobility) supervision  -     Row Name 02/12/22 0851          Transfers    Transfers sit-stand transfer; toilet transfer; bed-chair transfer  -CS     Bed-Chair [place] (Transfers) contact guard  -CS     Assistive Device (Bed-Chair Transfers) walker, front-wheeled  -CS     Sit-Stand [place] (Transfers) contact guard  -CS     [place] Level (Toilet Transfer) contact guard  -CS     Assistive Device (Toilet Transfer) walker, front-wheeled; grab bars/safety frame  -     Row Name 02/12/22 0851          Sit-Stand Transfer    Assistive Device (Sit-Stand Transfers) walker, front-wheeled  -     Row Name 02/12/22 0851          Toilet Transfer    Type (Toilet Transfer) sit-stand; stand-sit  -     Row Name 02/12/22 0851          Functional Mobility    Functional Mobility- Ind. Level contact guard assist  -CS     Functional Mobility- Device rolling walker  -     Row Name 02/12/22 0851          Activities of Daily Living    BADL Assessment/Intervention bathing; upper body dressing; lower body dressing; grooming; toileting  -     Row Name 02/12/22 0851          Toileting Assessment/Training    [place] Level (Toileting) toileting skills; standby assist  -CS     Position (Toileting) unsupported sitting  -     Row Name 02/12/22 0851          Grooming Assessment/Training    [place] Level (Grooming) grooming skills; hair care, combing/brushing; oral care regimen; wash face, hands; contact guard assist  -CS     Position (Grooming) sink side; supported standing  -     Row Name 02/12/22 0851          Bathing Assessment/Intervention    [place] Level (Bathing) bathing skills; upper body; supervision; lower body; contact guard assist  -CS     Assistive Devices (Bathing) bath mitt  -     Position (Bathing) edge of bed sitting; supported standing  -     Row Name 02/12/22 0851          Upper Body Dressing Assessment/Training    [place]  Level (Upper Body Dressing) upper body dressing skills; doff; don; set up  -CS     Position (Upper Body Dressing) edge of bed sitting  -CS     Row Name 02/12/22 0851          Lower Body Dressing Assessment/Training    [place] Level (Lower Body Dressing) lower body dressing skills; doff; don; set up  -CS     Position (Lower Body Dressing) edge of bed sitting  -CS           User Key  (r) = Recorded By, (t) = Taken By, (c) = Cosigned By    Initials Name Provider Type    Jennifer De Luna COTA Occupational Therapy Assistant               Obj/Interventions    No documentation.                Goals/Plan     Row Name 02/12/22 0851          Transfer Goal 1 (OT)    Activity/Assistive Device (Transfer Goal 1, OT) toilet  -CS     [place] Level/Cues Needed (Transfer Goal 1, OT) modified independence  -CS     Time Frame (Transfer Goal 1, OT) long term goal (LTG); by discharge  -CS     Progress/Outcome (Transfer Goal 1, OT) goal not met  -CS     Row Name 02/12/22 0851          Bathing Goal 1 (OT)    Activity/Device (Bathing Goal 1, OT) lower body bathing  AD as needed  -CS     [place] Level/Cues Needed (Bathing Goal 1, OT) modified independence  -CS     Time Frame (Bathing Goal 1, OT) long term goal (LTG); by discharge  -CS     Progress/Outcomes (Bathing Goal 1, OT) goal not met  -CS     Row Name 02/12/22 0851          Dressing Goal 1 (OT)    Activity/Device (Dressing Goal 1, OT) lower body dressing  AD as needed  -CS     [place]/Cues Needed (Dressing Goal 1, OT) modified independence  -CS     Time Frame (Dressing Goal 1, OT) long term goal (LTG); by discharge  -CS     Progress/Outcome (Dressing Goal 1, OT) goal not met  -CS           User Key  (r) = Recorded By, (t) = Taken By, (c) = Cosigned By    Initials Name Provider Type    Jennifer De Luna COTA Occupational Therapy Assistant               Clinical Impression     Row Name 02/12/22 0851          Pain Scale: Numbers Pre/Post-Treatment     Posttreatment Pain Rating 0/10 - no pain  -CS     Pre/Posttreatment Pain Comment Pt c/o numbness on R side.  -CS     Row Name 02/12/22 0851          Plan of Care Review    Plan of Care Reviewed With patient  -CS     Progress improving  -CS     Outcome Summary Pt tolerated tx well this date. Pt was SBA with sup-sit. Pt was CGA with sit-stand and toilet t/f. Pt was SBA with toileting. Pt completed an full ADL. Pt was CGA with grooming at sink side. Pt sitting up in chair at end of tx. Continue OT POC.  -CS     Row Name 02/12/22 0851          Therapy Assessment/Plan (OT)    Rehab Potential (OT) good, to achieve stated therapy goals  -CS     Criteria for Skilled Therapeutic Interventions Met (OT) yes; meets criteria; skilled treatment is necessary  -CS     Row Name 02/12/22 0851          Therapy Plan Review/Discharge Plan (OT)    Anticipated Discharge Disposition (OT) home; home with assist; home with home health  -CS     Row Name 02/12/22 0851          Vital Signs    Pre Patient Position Supine  -CS     Post Patient Position Sitting  -CS     Row Name 02/12/22 0851          Positioning and Restraints    Pre-Treatment Position in bed  -CS     Post Treatment Position chair  -CS     In Chair reclined; call light within reach; encouraged to call for assist; exit alarm on  -CS           User Key  (r) = Recorded By, (t) = Taken By, (c) = Cosigned By    Initials Name Provider Type    CS Jennifer Rolle COTA Occupational Therapy Assistant               Outcome Measures    No documentation.                 Occupational Therapy Education                 Title: PT OT SLP Therapies (Resolved)     Topic: Occupational Therapy (Resolved)     Point: ADL training (Resolved)     Description:   Instruct learner(s) on proper safety adaptation and remediation techniques during self care or transfers.   Instruct in proper use of assistive devices.              Learner Progress:  Not documented in this visit.          Point: Home exercise  program (Resolved)     Description:   Instruct learner(s) on appropriate technique for monitoring, assisting and/or progressing therapeutic exercises/activities.              Learner Progress:  Not documented in this visit.          Point: Precautions (Resolved)     Description:   Instruct learner(s) on prescribed precautions during self-care and functional transfers.              Learning Progress Summary           Patient Acceptance, E, VU by ME at 2/11/2022 1028    Comment: Educated on OT and POC. Educated to call for assistance. Educated on safety precautions.                   Point: Body mechanics (Resolved)     Description:   Instruct learner(s) on proper positioning and spine alignment during self-care, functional mobility activities and/or exercises.              Learning Progress Summary           Patient Acceptance, E, VU by ME at 2/11/2022 1028    Comment: Educated on OT and POC. Educated to call for assistance. Educated on safety precautions.                               User Key     Initials Effective Dates Name Provider Type Discipline    ME 06/16/21 -  Laurel Eckert OTR/L Occupational Therapist OT              OT Recommendation and Plan     Plan of Care Review  Plan of Care Reviewed With: patient  Progress: improving  Outcome Summary: Pt tolerated tx well this date. Pt was SBA with sup-sit. Pt was CGA with sit-stand and toilet t/f. Pt was SBA with toileting. Pt completed an full ADL. Pt was CGA with grooming at sink side. Pt sitting up in chair at end of tx. Continue OT POC.     Time Calculation:    Time Calculation- OT     Row Name 02/12/22 1148             Time Calculation- OT    OT Start Time 0851  -CS      OT Stop Time 0948  -CS      OT Time Calculation (min) 57 min  -CS      Total Timed Code Minutes- OT 57 minute(s)  -CS      OT Received On 02/12/22  -CS              Timed Charges    00890 - OT Self Care/Mgmt Minutes 57  -CS              Total Minutes    Timed Charges Total Minutes 57  -CS        Total Minutes 57  -CS            User Key  (r) = Recorded By, (t) = Taken By, (c) = Cosigned By    Initials Name Provider Type    CS Jennifer Rolle COTA Occupational Therapy Assistant              Therapy Charges for Today     Code Description Service Date Service Provider Modifiers Qty    28757113187 HC OT SELF CARE/MGMT/TRAIN EA 15 MIN 2/12/2022 Jennifer Rolle COTA GO 4               VERONICA Quarles  2/12/2022

## 2022-02-12 NOTE — PLAN OF CARE
Goal Outcome Evaluation:  Plan of Care Reviewed With: patient        Progress: improving  Outcome Summary: Pt tolerated tx well this date. Pt was SBA with sup-sit. Pt was CGA with sit-stand and toilet t/f. Pt was SBA with toileting. Pt completed an full ADL. Pt was CGA with grooming at sink side. Pt sitting up in chair at end of tx. Continue OT POC.

## 2022-02-12 NOTE — DISCHARGE INSTR - APPOINTMENTS
Call your primary care provider on Monday 2/14 in am to schedule follow up appointment.  Also, Call your home health therapy provider to advise you are released from hospital

## 2022-02-12 NOTE — DISCHARGE PLACEMENT REQUEST
"Román Carter (76 y.o. Male)             Date of Birth Social Security Number Address Home Phone MRN    1945  [address]  [place] [address] [phone] [id number]    Roman Catholic Marital Status             Anabaptism        Admission Date Admission Type Admitting Provider Attending Provider Department, Room/Bed    2/10/22 Emergency Madan Brown MD Ebenibo, Sotonte E, MD [address], 356/1    Discharge Date Discharge Disposition Discharge Destination           Home-Health Care [facility] [place]              Attending Provider: Samantha Ko MD    Allergies: Levofloxacin    Isolation: None   Infection: None   Code Status: CPR   Advance Care Planning Activity    Ht: 170.2 cm (67.01\")   Wt: 83.9 kg (184 lb 15.5 oz)    Admission Cmt: None   Principal Problem: Acute CVA (cerebrovascular accident) (HCC) [I63.9]                 Active Insurance as of 2/10/2022     Primary Coverage     Payor Plan Insurance Group Employer/Plan Group    HUMANA MEDICARE REPLACEMENT HUMANA MEDICARE REPLACEMENT [id number]     Payor Plan Address Payor Plan Phone Number Payor Plan Fax Number Effective Dates    [address] [address] [phone]  1/1/2018 - None Entered    [facility] [address]       Subscriber Name Subscriber Birth Date Member ID       ROMÁN CARTER 1945 [id number]           Secondary Coverage     Payor Plan Insurance Group Employer/Plan Group    [facility] [place]MIN[place]ATION [place] DEPT [id number]     Payor Plan Address Payor Plan Phone Number Payor Plan Fax Number Effective Dates    [facility] OFFICE OF COMMUNITY CARE [phone]  8/19/1998 - None Entered    [address] [address]       [facility] [address]       Subscriber Name Subscriber Birth Date Member ID       ROMÁN CARTER 1945 [id number]                 Emergency Contacts      (Rel.) Home Phone Work Phone Mobile Phone    Joy Carter (Daughter) [phone] -- [phone]          MADIE " [address] [address]  Phone:  [phone]  Fax:  [phone] Date: 2022      Ambulatory Referral to Home Health     Patient:  Román Corey MRN:  [id number]   [address]  [place] [address] :  1945  SSN:    Phone: [phone] Sex:  M      INSURANCE PAYOR PLAN GROUP # SUBSCRIBER ID   Primary:  Secondary:    HUMANA MEDICARE REPLACEMENT  [employer] [id number]  [id number]  [id number]  [id number]      Referring Provider Information:  NUZHAT JIMENEZ Phone: [phone] Fax: [phone]      Referral Information:   # Visits:  999 Referral Type: Home Health [42]   Urgency:  Routine Referral Reason: Specialty Services Required   Start Date: 2022 End Date:  To be determined by Insurer   Diagnosis: Impaired mobility and ADLs (Z74.09,Z78.9 [ICD-10-CM] V49.89 [ICD-9-CM])  Impaired functional mobility, balance, gait, and endurance (Z74.09 [ICD-10-CM] V49.89 [ICD-9-CM])  Acute CVA (cerebrovascular accident) (HCC) (I63.9 [ICD-10-CM] 434.91 [ICD-9-CM])      Refer to Dept:   Refer to Provider:   Refer to Facility:       Face to Face Visit Date: 2022  Follow-up provider for Plan of Care? I treated the patient in an acute care facility and will not continue treatment after discharge.  Follow-up provider: JB VIZCAINO [[id number]]  Reason/Clinical Findings: Stroke with right sided numbness  Describe mobility limitations that make leaving home difficult: Stroke with right sided numbness  Nursing/Therapeutic Services Requested: Physical Therapy  Nursing/Therapeutic Services Requested: Occupational Therapy  PT orders: Therapeutic exercise  PT orders: Strengthening  Occupational orders: Activities of daily living  Occupational orders: Strengthening  Frequency: 1 Week 1     This document serves as a request of services and does not constitute Insurance authorization or approval of services.  To determine  eligibility, please contact the members Insurance carrier to verify and review coverage.     If you have medical questions regarding this request for services. Please contact [address] at [phone] during normal business hours.        Authorizing Provider:Samantha Ko MD  Authorizing Provider's NPI: [id number]  Order Entered By: Samantha Ko MD 2/12/2022 11:25 AM     Electronically signed by: Samantha Ko MD 2/12/2022 11:25 AM

## 2022-02-12 NOTE — DISCHARGE SUMMARY
[facility] Medicine Services  DISCHARGE SUMMARY       Date of Admission: 2/10/2022  Date of Discharge:  2/12/2022  Primary Care Physician: Cam Cortez MD    Presenting Problem/History of Present Illness:  Numbness [R20.0]     Final Discharge Diagnoses:  Active Hospital Problems    Diagnosis    • **Acute CVA (cerebrovascular accident) (HCC)    • Numbness    Essential hypertension    Consults:   Consults     Date and Time Order Name Status Description    2/10/2022  5:01 PM Hospitalist (on-call MD unless specified)      2/10/2022  3:16 PM Inpatient Neurology Consult Stroke            Procedures Performed: None                Pertinent Test Results:   MRI Brain Without Contrast [[id number]] Kit as Reviewed   Order Status: Completed Collected: 02/10/22 1734    Updated: 02/10/22 1850   Narrative:     EXAM:   MR BRAIN WITHOUT IV CONTRAST     ORDERING PROVIDER:   JAMILAH UREÑA     CLINICAL HISTORY:   Stroke     COMPARISON:   CT scan of the brain on the same date     TECHNIQUE:   Axial T1-weighted, T2-weighted and diffusion weighted, FLAIR, and   sagittal T1-weighted images,  T2 gradient echo as well as axial   and coronal T1 weight images with no IV contrast were obtained   using a 1.5 Laury MRI machine.     FINDINGS:     CEREBRAL PARENCHYMA: Scattered T2 and FLAIR hyperintensities.   These are consistent with chronic microangiopathic changes. No   encephalomalacia, mass or gliosis. Mild age dependent cerebral   atrophy.     POSTERIOR FOSSA: No mass or abnormal signal. Unremarkable   craniocervical junction.     EXTRA-AXIAL SPACES: No mass. Unremarkable contour.     PITUITARY: No mass or parasellar abnormality.     ORBITS: No mass. Unremarkable extraocular muscles, globe and   optic nerve.     CALVARIA AND SOFT TISSUES:  No mass, adenopathy, or abnormal   signal.     TEMPORAL BONE AND SKULL BASE: Unremarkable mastoid air cells,   inner and middle ear.     PARANASAL SINUSES:  Unremarkable.     VASCULAR STRUCTURES: Normal flow voids are present..     DIFFUSION WEIGHTED IMAGES: Acute restricted diffusion in the left   edis measuring 1 x 0.8 cm in cross-section.     SUSCEPTIBILITY SEQUENCE: No susceptibility artifact.    Impression:     Acute ischemia in the left edis measuring 1 x 0.8 cm in   cross-section.   Age-appropriate cerebral atrophy   Chronic microangiopathic changes.   No evidence of acute intracranial hemorrhage.     Electronically signed by:  Dax Thurston MD  2/10/2022 6:48 PM CST   Workstation: [id number]     Chief Complaint on Day of Discharge: None    Hospital Course:  The patient is a 76 y.o. male with a past medical history of congestive heart failure status post heart transplant 20 years ago, orthostatic hypotension, CKD stage III, type 2 diabetes mellitus, essential hypertension, GERD and obstructive sleep apnea with CPAP approval pending.  He presented with complaints of right-sided arm and leg numbness of 3 days duration.     Patient developed numbness of the right side of his body about 3 days prior to presenting.  The numbness first started on his right leg then involved his right arm.  He denied weakness of his right arm or leg or any part of the body.  He also had occipital headache during this time and had some neck pain as well.  In addition he had some transient speech difficulty the day prior to presentation when he was communicating with his wife. On account of his persistent symptoms he decided to come to the emergency room for evaluation. CT scan of the head was negative for intracranial hemorrhage but an MRI of the brain showed an acute ischemia in the left edis measuring 1 x 0.8 cm. Patient received aspirin loading dose.  He was reviewed by neurologist and was started on dual antiplatelet therapy with aspirin and Plavix as well as high intensity statin.  Echocardiogram was done which did not show any intracardiac thrombus.  Patient had physical and  "Occupational Therapy and his blood pressure was controlled during the admission.  He was discharged in stable condition with referral for home health for PT and OT on discharge.  He was instructed to continue dual antiplatelet therapy with aspirin 81 mg daily and Plavix 75 mg daily for 3 months then to stop Plavix and increase his aspirin dose to 325 mg daily.    Condition on Discharge: Stable    Physical Exam on Discharge:  /66   Pulse 81   Temp 97.6 °F (36.4 °C) (Oral)   Resp 16   Ht 170.2 cm (67.01\")   Wt 83.9 kg (184 lb 15.5 oz)   SpO2 95%   BMI 28.96 kg/m²   Physical Exam  Constitutional:       General: He is not in acute distress.     Appearance: He is well-developed. He is not diaphoretic.   HENT:      Head: Normocephalic.   Eyes:      Conjunctiva/sclera: Conjunctivae normal.      Pupils: Pupils are equal, round, and reactive to light.   Neck:      Thyroid: No thyromegaly.      Vascular: No JVD.      Trachea: No tracheal deviation.   Cardiovascular:      Rate and Rhythm: Normal rate and regular rhythm.      Heart sounds: Normal heart sounds. No murmur heard.  No friction rub. No gallop.    Pulmonary:      Effort: Pulmonary effort is normal. No respiratory distress.      Breath sounds: Normal breath sounds. No stridor. No wheezing or rales.   Chest:      Chest wall: No tenderness.   Abdominal:      General: Bowel sounds are normal. There is no distension.      Palpations: Abdomen is soft. There is no mass.      Tenderness: There is no abdominal tenderness. There is no guarding or rebound.      Hernia: No hernia is present.   Musculoskeletal:         General: No tenderness or deformity. Normal range of motion.      Cervical back: Normal range of motion and neck supple.      Right lower leg: No edema.      Left lower leg: No edema.   Lymphadenopathy:      Cervical: No cervical adenopathy.   Skin:     General: Skin is warm and dry.      Coloration: Skin is not pale.      Findings: No erythema or " rash.   Neurological:      General: No focal deficit present.      Mental Status: He is alert and oriented to person, place, and time.      Cranial Nerves: No cranial nerve deficit.      Sensory: Sensory deficit present.      Motor: No abnormal muscle tone.      Coordination: Coordination normal.      Comments: Numbess in right arm and leg.   Psychiatric:         Mood and Affect: Mood normal.         Behavior: Behavior normal.         Thought Content: Thought content normal.    Discharge Disposition:  Home-Health Care [facility] [place]    Discharge Medications:     Discharge Medications      New Medications      Instructions Start Date   clopidogrel 75 MG tablet  Commonly known as: PLAVIX   75 mg, Oral, Daily   Start Date: February 13, 2022        Changes to Medications      Instructions Start Date   rosuvastatin 20 MG tablet  Commonly known as: CRESTOR  What changed:   · medication strength  · how much to take   20 mg, Oral, Nightly         Continue These Medications      Instructions Start Date   amLODIPine 10 MG tablet  Commonly known as: NORVASC   10 mg, Oral, Daily, At lunch      aspirin 81 MG chewable tablet   81 mg, Oral, Daily      azaTHIOprine 50 MG tablet  Commonly known as: IMURAN   100 mg, Oral, Daily      calcitriol 0.25 MCG capsule  Commonly known as: ROCALTROL   0.25 mcg, Oral, Every Other Day      calcium-vitamin D 500-200 MG-UNIT per tablet  Commonly known as: OSCAL-500   1 tablet, Oral, Daily      docusate sodium 100 MG capsule  Commonly known as: COLACE   100 mg, Oral, As Needed      doxazosin 8 MG tablet  Commonly known as: CARDURA   8 mg, Oral, 2 Times Daily      ezetimibe 10 MG tablet  Commonly known as: ZETIA   10 mg, Oral, Nightly      fish oil 1000 MG capsule capsule   1 capsule, Oral, 2 Times Daily With Meals      furosemide 40 MG tablet  Commonly known as: LASIX   20 mg, Oral, Daily      gabapentin 100 MG capsule  Commonly known as: NEURONTIN   200 mg, Oral, 3 Times Daily      Gengraf 100 MG  capsule  Generic drug: cycloSPORINE modified   100 mg, Oral, 2 Times Daily      insulin aspart 100 UNIT/ML injection  Commonly known as: novoLOG   23 Units, Subcutaneous, 3 Times Daily      insulin glargine 100 UNIT/ML injection  Commonly known as: LANTUS, SEMGLEE   30 Units, Subcutaneous, 2 Times Daily      isosorbide dinitrate 20 MG tablet  Commonly known as: ISORDIL   20 mg, Oral, 3 Times Daily, 8:00 am, 1:00 pm' and 6:00 pm      metoprolol succinate XL 50 MG 24 hr tablet  Commonly known as: TOPROL-XL   50 mg, Oral, Daily      multivitamin with minerals tablet tablet   1 tablet, Oral, Daily      POTASSIUM CHLORIDE PO   40 mEq, Oral, Nightly      Sennosides 15 MG chewable tablet   2 tablets, Oral, 2 Times Daily PRN             Discharge Diet:   Diet Instructions     Diet: Cardiac, Consistent Carbohydrate      Discharge Diet:  Cardiac  Consistent Carbohydrate             Activity at Discharge:   Activity Instructions     Activity as Tolerated            Discharge Care Plan/Instructions:   1.  Follow-up with your primary care provider within 1 week of discharge.   2.  You should continue taking aspirin 81 mg daily and Plavix 75 mg daily for 3 months until 5/14/2022.  Then stop those medications and start taking aspirin 325 mg daily continuously for stroke prevention.    Follow-up Appointments:   No future appointments.    Test Results Pending at Discharge:     Samantha Ko MD  02/12/22  11:26 CST    Time: 36 minutes of time was spent evaluating patient and planning discharge.      Part of this note may be an electronic transcription/translation of spoken language to printed text using the Dragon Dictation system.

## 2022-02-13 ENCOUNTER — READMISSION MANAGEMENT (OUTPATIENT)
Dept: CALL CENTER | Facility: HOSPITAL | Age: 77
End: 2022-02-13

## 2022-02-13 NOTE — OUTREACH NOTE
Prep Survey      Responses   [facility] [place]   Is LACE score < 7 ? Yes   Emergency Room discharge w/ pulse ox? No   Eligibility Readm Mgmt   Discharge diagnosis Acute CVA    Does the patient have one of the following disease processes/diagnoses(primary or secondary)? Stroke (TIA)   Does the patient have Home health ordered? Yes   What is the Home health agency?  [facility]- current pt   Is there a DME ordered? No   Prep survey completed? Yes          Roxanne Crowell RN

## 2022-02-16 ENCOUNTER — READMISSION MANAGEMENT (OUTPATIENT)
Dept: CALL CENTER | Facility: HOSPITAL | Age: 77
End: 2022-02-16

## 2022-02-16 NOTE — OUTREACH NOTE
Stroke Week 1 Survey      Responses   [facility] patient discharged from? [place]   Does the patient have one of the following disease processes/diagnoses(primary or secondary)? Stroke (TIA)   Week 1 attempt successful? Yes   Call start time 1302   Call end time 1307   Discharge diagnosis Acute CVA    Meds reviewed with patient/caregiver? Yes   Is the patient having any side effects they believe may be caused by any medication additions or changes? No   Does the patient have all medications ordered at discharge? Yes   Is the patient taking all medications as directed (includes completed medication regime)? Yes   Does the patient have a primary care provider?  Yes   Does the patient have an appointment with their PCP within 7 days of discharge? No   Comments regarding PCP Dr Cortez   What is preventing the patient from scheduling follow up appointments within 7 days of discharge? Haven't had time   Nursing Interventions Educated patient on importance of making appointment,  Advised patient to make appointment   What is the Home health agency?  [facility]- current pt   Has home health visited the patient within 72 hours of discharge? Yes   Psychosocial issues? No   Does the patient require any assistance with activities of daily living such as eating, bathing, dressing, walking, etc.? No   Does the patient have any residual symptoms from stroke/TIA? Yes   Residual symptoms comments still with right side weakness    Does the patient understand the diet ordered at discharge? Yes   Did the patient receive a copy of their discharge instructions? Yes   Nursing interventions Reviewed instructions with patient   What is the patient's perception of their health status since discharge? Improving   Nursing interventions Nurse provided patient education   Is the patient able to teach back FAST for Stroke? Yes   Is the patient/caregiver able to teach back the risk factors for a stroke? High blood pressure-goal below  120/80   Is the patient/caregiver able to teach back signs and symptoms related to disease process for when to call PCP? Yes   Is the patient/caregiver able to teach back signs and symptoms related to disease process for when to call 911? Yes   If the patient is a current smoker, are they able to teach back resources for cessation? Not a smoker   Week 1 call completed? Yes          Oneil Andres RN

## 2022-02-20 LAB
QT INTERVAL: 396 MS
QTC INTERVAL: 489 MS

## 2022-02-24 ENCOUNTER — READMISSION MANAGEMENT (OUTPATIENT)
Dept: CALL CENTER | Facility: HOSPITAL | Age: 77
End: 2022-02-24

## 2022-02-24 NOTE — OUTREACH NOTE
Stroke Week 2 Survey      Responses   [facility] patient discharged from? [place]   Does the patient have one of the following disease processes/diagnoses(primary or secondary)? Stroke (TIA)   Week 2 attempt successful? Yes   Call start time 1239   Call end time 1240   Discharge diagnosis Acute CVA    Is the patient taking all medications as directed (includes completed medication regime)? Yes   Does the patient have a primary care provider?  Yes   Has the patient kept scheduled appointments due by today? Yes   What is the Home health agency?  [facility]- current pt   Psychosocial issues? No   Does the patient require any assistance with activities of daily living such as eating, bathing, dressing, walking, etc.? No   Does the patient have any residual symptoms from stroke/TIA? Yes   Does the patient understand the diet ordered at discharge? Yes   What is the patient's perception of their health status since discharge? Improving   Nursing interventions Nurse provided patient education   Is the patient able to teach back FAST for Stroke? Yes   Is the patient/caregiver able to teach back the risk factors for a stroke? High blood pressure-goal below 120/80   Is the patient/caregiver able to teach back signs and symptoms related to disease process for when to call PCP? Yes   Is the patient/caregiver able to teach back signs and symptoms related to disease process for when to call 911? Yes   Is the patient/caregiver able to teach back the hierarchy of who to call/visit for symptoms/problems? PCP, Specialist, Home health nurse, Urgent Care, ED, 911 Yes   Week 2 call completed? Yes   Wrap up additional comments Doing well, has had some f/u appts and will have some more, no questions at this time.          Jeni Wood RN

## 2022-03-04 ENCOUNTER — READMISSION MANAGEMENT (OUTPATIENT)
Dept: CALL CENTER | Facility: HOSPITAL | Age: 77
End: 2022-03-04

## 2022-03-04 NOTE — OUTREACH NOTE
Stroke Week 3 Survey      Responses   [facility] patient discharged from? [place]   Does the patient have one of the following disease processes/diagnoses(primary or secondary)? Stroke (TIA)   Week 3 attempt successful? Yes   Call start time 1118   Call end time 1123   Discharge diagnosis Acute CVA    Meds reviewed with patient/caregiver? Yes   Is the patient having any side effects they believe may be caused by any medication additions or changes? No   Does the patient have all medications ordered at discharge? Yes   Is the patient taking all medications as directed (includes completed medication regime)? Yes   Does the patient have a primary care provider?  Yes   Comments regarding PCP Will see Dr. Cortez today   Has the patient kept scheduled appointments due by today? Yes   What is the Home health agency?  [facility]- current pt   Has home health visited the patient within 72 hours of discharge? Yes   Psychosocial issues? No   Does the patient require any assistance with activities of daily living such as eating, bathing, dressing, walking, etc.? No   Does the patient have any residual symptoms from stroke/TIA? Yes   Does the patient understand the diet ordered at discharge? Yes   Did the patient receive a copy of their discharge instructions? Yes   Nursing interventions Reviewed instructions with patient   What is the patient's perception of their health status since discharge? Improving   Nursing interventions Nurse provided patient education   Is the patient able to teach back FAST for Stroke? Yes  [Reviewed BeFAST with pt. ]   Is the patient/caregiver able to teach back the risk factors for a stroke? High blood pressure-goal below 120/80   Is the patient/caregiver able to teach back signs and symptoms related to disease process for when to call PCP? Yes   Is the patient/caregiver able to teach back signs and symptoms related to disease process for when to call 911? Yes   If the patient is a current  "smoker, are they able to teach back resources for cessation? Not a smoker   Is the patient/caregiver able to teach back the hierarchy of who to call/visit for symptoms/problems? PCP, Specialist, Home health nurse, Urgent Care, ED, 911 Yes   Week 3 call completed? Yes   Wrap up additional comments Pt states he is doing ok, sometimes has a balance issue but \"I'm supposed to use my walker\". Will see PCP today and will ask about his BP medications states he is on 4 BP medications at present time.           eLslie Mann RN  "

## 2022-03-15 ENCOUNTER — READMISSION MANAGEMENT (OUTPATIENT)
Dept: CALL CENTER | Facility: HOSPITAL | Age: 77
End: 2022-03-15

## 2022-03-15 NOTE — OUTREACH NOTE
Stroke Week 4 Survey    Flowsheet Row Responses   [facility] patient discharged from? [place]   Does the patient have one of the following disease processes/diagnoses(primary or secondary)? Stroke (TIA)   Week 4 attempt successful? Yes   Call start time 1709   Call end time 1717   Discharge diagnosis Acute CVA    Is patient permission given to speak with other caregiver? No   Meds reviewed with patient/caregiver? Yes   Is the patient taking all medications as directed (includes completed medication regime)? Yes   Has the patient kept scheduled appointments due by today? Yes   Is the patient still receiving Home Health Services? No   Psychosocial issues? No   Does the patient require any assistance with activities of daily living such as eating, bathing, dressing, walking, etc.? No   ADL comments Patient independent with walking and ADL's.    Does the patient have any residual symptoms from stroke/TIA? Yes   Residual symptoms comments Continued right sided numbness, arm and leg   Does the patient understand the diet ordered at discharge? Yes   What is the patient's perception of their health status since discharge? Improving   Nursing interventions Nurse provided patient education   Is the patient able to teach back FAST for Stroke? Yes   Is the patient/caregiver able to teach back the risk factors for a stroke? High blood pressure-goal below 120/80, Diabetes, High Cholesterol   Is the patient/caregiver able to teach back signs and symptoms related to disease process for when to call PCP? Yes   Is the patient/caregiver able to teach back signs and symptoms related to disease process for when to call 911? Yes   If the patient is a current smoker, are they able to teach back resources for cessation? Not a smoker   Is the patient/caregiver able to teach back the hierarchy of who to call/visit for symptoms/problems? PCP, Specialist, Home health nurse, Urgent Care, ED, 911 Yes   Week 4 Call Completed? Yes    Would the patient like one additional call? No   Graduated Yes   Is the patient interested in additional calls from an ambulatory ?  NOTE:  applies to high risk patients requiring additional follow-up. No   Did the patient feel the follow up calls were helpful during their recovery period? Yes   Was the number of calls appropriate? Yes          GRIFFIN TAYLOR - Registered Nurse

## 2022-03-25 ENCOUNTER — OFFICE VISIT (OUTPATIENT)
Dept: NEUROLOGY | Facility: TELEHEALTH | Age: 77
End: 2022-03-25

## 2022-03-25 VITALS
BODY MASS INDEX: 30.17 KG/M2 | RESPIRATION RATE: 18 BRPM | HEIGHT: 67 IN | WEIGHT: 192.2 LBS | DIASTOLIC BLOOD PRESSURE: 72 MMHG | SYSTOLIC BLOOD PRESSURE: 140 MMHG | HEART RATE: 86 BPM

## 2022-03-25 DIAGNOSIS — Z86.73 HISTORY OF RECENT STROKE: Primary | ICD-10-CM

## 2022-03-25 PROCEDURE — 99215 OFFICE O/P EST HI 40 MIN: CPT | Performed by: NURSE PRACTITIONER

## 2022-03-25 NOTE — PROGRESS NOTES
Stroke Progress Note       Chief Complaint: Stroke follow-up    Subjective     HPI: Pt is a 76-yr-old right-handed white male with known diagnosis of HF s/p heart transplant in 2002, hypertension, DM2, and SHAQ who admitted after four days of numbness/tingling to right arm and leg and was diagnosed with a left pontine stroke. Today is very pleasant and states he is back to his normal routine. His numbness on right arm and leg remain the same. NIHSS is 1, MRS 2, and PQ9 is 0. Upon exam he is A&OX4, strengths are equal, decreased sensation to right arm and leg, and visual field is intact.       Review of Systems   HENT: Negative.    Eyes: Negative.    Respiratory: Negative.    Cardiovascular: Negative.    Genitourinary: Negative.    Musculoskeletal: Negative.    Neurological: Positive for numbness.   Psychiatric/Behavioral: Negative.         Objective      Heart Rate:  [86] 86  Resp:  [18] 18  BP: (140)/(72) 140/72    Neurological Exam  Mental Status  Alert. Oriented to person, place, time and situation. Oriented to person, place, and time. Speech is normal. Language is fluent with no aphasia.    Cranial Nerves  CN II: Visual acuity is normal. Visual fields full to confrontation.  CN III, IV, VI: Extraocular movements intact bilaterally. Normal lids and orbits bilaterally. Pupils equal round and reactive to light bilaterally.  CN V: Facial sensation is normal.  CN VII: Full and symmetric facial movement.  CN IX, X: Palate elevates symmetrically. Normal gag reflex.  CN XI: Shoulder shrug strength is normal.  CN XII: Tongue midline without atrophy or fasciculations.    Motor  Normal muscle bulk throughout. No fasciculations present. Strength is 5/5 throughout all four extremities.    Sensory  Light touch abnormality: Right arm and leg..     Reflexes                                            Right                      Left  Brachioradialis                    2+                         2+  Biceps                                  2+                         2+  Patellar                                2+                         2+    Coordination    Finger-to-nose, rapid alternating movements and heel-to-shin normal bilaterally without dysmetria.    Gait  Normal casual, toe, heel and tandem gait.  Walks with walker d/t left knee issues.      Physical Exam  Vitals reviewed.   Eyes:      General: Lids are normal.      Extraocular Movements: Extraocular movements intact.      Pupils: Pupils are equal, round, and reactive to light.   Cardiovascular:      Rate and Rhythm: Normal rate.   Pulmonary:      Effort: Pulmonary effort is normal.   Musculoskeletal:         General: Normal range of motion.      Cervical back: Normal range of motion.   Skin:     General: Skin is warm and dry.   Neurological:      Mental Status: He is alert and oriented to person, place, and time.      Sensory: Sensory deficit present.      Coordination: Coordination is intact.      Deep Tendon Reflexes: Strength normal.      Reflex Scores:       Bicep reflexes are 2+ on the right side and 2+ on the left side.       Brachioradialis reflexes are 2+ on the right side and 2+ on the left side.       Patellar reflexes are 2+ on the right side and 2+ on the left side.  Psychiatric:         Mood and Affect: Mood normal.         Speech: Speech normal.         Results Review:    I reviewed the patient's new clinical results.    Lab Results (last 24 hours)     ** No results found for the last 24 hours. **        No radiology results for the last day  Results for orders placed during the hospital encounter of 02/10/22    Adult Transthoracic Echo Complete W/ Cont if Necessary Per Protocol    Interpretation Summary  · Left ventricular wall thickness is consistent with moderate concentric hypertrophy.  · Left ventricular ejection fraction appears to be 61 - 65%.  · Left ventricular diastolic function was normal.  · Saline test results are negative for right to left atrial level  shunt.        Assessment/Plan     Assessment/Plan: Pt is a 76-yr-old right-handed white male with known diagnosis of HF s/p heart transplant in 2002, hypertension, DM2, and SHAQ who admitted after four days of numbness/tingling to right arm and leg and was diagnosed with a left pontine stroke. Today is very pleasant and states he is back to his normal routine. His numbness on right arm and leg remain the same. NIHSS is 1, MRS 2, and PQ9 is 0. Upon exam he is A&OX4, strengths are equal, decreased sensation to right arm and leg, and visual field is intact.   Left pontine stroke- Instructed to continue taking aspirin 81 mg, Plavix 75 mg, and Crestor 20 mg/day. Gave written instructions that on 5/10 to stop Plavix and start taking aspirin 325 mg/day along with Crestor 20 mg/day. Pt verbalized understanding and stated he has been compliant with all medications. He is monitoring his BP daily and keeping a log for PCP. States his glucose has also improved. Advised to follow-up with transplant physician and he stated he called and was waiting for an appointment. Instructed on stroke risk factors, prevention, and s/s to call 911. Pt verbalized understanding. All questions and concerns were answered.           Patient Active Problem List   Diagnosis   • Near syncope   • Orthostatic dizziness   • SHAQ (acute kidney injury) (HCC)   • Numbness   • Acute CVA (cerebrovascular accident) (HCC)           ADOLPH Wolf  03/25/22  09:11 CDT        I spent 45 minutes caring for Román Corey  on this date of service. This time includes time spent by me in the following activities: preparing for the visit, reviewing tests, obtaining and/or reviewing a separately obtained history, performing a medically appropriate examination and/or evaluation, counseling and educating the patient/family/caregiver, ordering medications, tests, or procedures, referring and communicating with other health care professionals, documenting  information in the medical record, independently interpreting results and communicating that information with the patient/family/caregiver and care coordination

## 2022-07-14 ENCOUNTER — HOSPITAL ENCOUNTER (OUTPATIENT)
Facility: HOSPITAL | Age: 77
Setting detail: OBSERVATION
Discharge: HOME | End: 2022-07-17
Attending: EMERGENCY MEDICINE | Admitting: HOSPITALIST

## 2022-07-14 DIAGNOSIS — N17.9 AKI (ACUTE KIDNEY INJURY): Primary | ICD-10-CM

## 2022-07-14 DIAGNOSIS — E86.0 DEHYDRATION: ICD-10-CM

## 2022-07-14 DIAGNOSIS — I95.1 ORTHOSTATIC HYPOTENSION: ICD-10-CM

## 2022-07-14 DIAGNOSIS — U07.1 COVID-19: ICD-10-CM

## 2022-07-14 LAB
ALBUMIN SERPL-MCNC: 3.6 G/DL (ref 3.5–5.2)
ALBUMIN/GLOB SERPL: 1.1 G/DL
ALP SERPL-CCNC: 94 U/L (ref 39–117)
ALT SERPL W P-5'-P-CCNC: 17 U/L (ref 1–41)
ANION GAP SERPL CALCULATED.3IONS-SCNC: 16 MMOL/L (ref 5–15)
AST SERPL-CCNC: 26 U/L (ref 1–40)
BASOPHILS # BLD AUTO: 0.02 10*3/MM3 (ref 0–0.2)
BASOPHILS NFR BLD AUTO: 0.4 % (ref 0–1.5)
BILIRUB SERPL-MCNC: <0.2 MG/DL (ref 0–1.2)
BUN SERPL-MCNC: 69 MG/DL (ref 8–23)
BUN/CREAT SERPL: 18.1 (ref 7–25)
CALCIUM SPEC-SCNC: 7.9 MG/DL (ref 8.6–10.5)
CHLORIDE SERPL-SCNC: 96 MMOL/L (ref 98–107)
CO2 SERPL-SCNC: 21 MMOL/L (ref 22–29)
CREAT SERPL-MCNC: 3.82 MG/DL (ref 0.76–1.27)
DEPRECATED RDW RBC AUTO: 38.1 FL (ref 37–54)
EGFRCR SERPLBLD CKD-EPI 2021: 15.4 ML/MIN/1.73
EOSINOPHIL # BLD AUTO: 0.1 10*3/MM3 (ref 0–0.4)
EOSINOPHIL NFR BLD AUTO: 1.8 % (ref 0.3–6.2)
ERYTHROCYTE [DISTWIDTH] IN BLOOD BY AUTOMATED COUNT: 13.2 % (ref 12.3–15.4)
FLUAV RNA RESP QL NAA+PROBE: NOT DETECTED
FLUBV RNA RESP QL NAA+PROBE: NOT DETECTED
GLOBULIN UR ELPH-MCNC: 3.4 GM/DL
GLUCOSE BLDC GLUCOMTR-MCNC: 298 MG/DL (ref 70–130)
GLUCOSE SERPL-MCNC: 282 MG/DL (ref 65–99)
HCT VFR BLD AUTO: 27.7 % (ref 37.5–51)
HGB BLD-MCNC: 9.6 G/DL (ref 13–17.7)
HOLD SPECIMEN: NORMAL
HOLD SPECIMEN: NORMAL
IMM GRANULOCYTES # BLD AUTO: 0.02 10*3/MM3 (ref 0–0.05)
IMM GRANULOCYTES NFR BLD AUTO: 0.4 % (ref 0–0.5)
LYMPHOCYTES # BLD AUTO: 2.1 10*3/MM3 (ref 0.7–3.1)
LYMPHOCYTES NFR BLD AUTO: 37 % (ref 19.6–45.3)
MAGNESIUM SERPL-MCNC: 2.1 MG/DL (ref 1.6–2.4)
MCH RBC QN AUTO: 27.1 PG (ref 26.6–33)
MCHC RBC AUTO-ENTMCNC: 34.7 G/DL (ref 31.5–35.7)
MCV RBC AUTO: 78.2 FL (ref 79–97)
MONOCYTES # BLD AUTO: 0.67 10*3/MM3 (ref 0.1–0.9)
MONOCYTES NFR BLD AUTO: 11.8 % (ref 5–12)
NEUTROPHILS NFR BLD AUTO: 2.76 10*3/MM3 (ref 1.7–7)
NEUTROPHILS NFR BLD AUTO: 48.6 % (ref 42.7–76)
NRBC BLD AUTO-RTO: 0 /100 WBC (ref 0–0.2)
NT-PROBNP SERPL-MCNC: 678.5 PG/ML (ref 0–1800)
PLATELET # BLD AUTO: 208 10*3/MM3 (ref 140–450)
PMV BLD AUTO: 9.8 FL (ref 6–12)
POTASSIUM SERPL-SCNC: 4.8 MMOL/L (ref 3.5–5.2)
PROT SERPL-MCNC: 7 G/DL (ref 6–8.5)
RBC # BLD AUTO: 3.54 10*6/MM3 (ref 4.14–5.8)
SARS-COV-2 RNA RESP QL NAA+PROBE: DETECTED
SODIUM SERPL-SCNC: 133 MMOL/L (ref 136–145)
TROPONIN T SERPL-MCNC: <0.01 NG/ML (ref 0–0.03)
WBC NRBC COR # BLD: 5.67 10*3/MM3 (ref 3.4–10.8)
WHOLE BLOOD HOLD COAG: NORMAL
WHOLE BLOOD HOLD SPECIMEN: NORMAL

## 2022-07-14 PROCEDURE — 87636 SARSCOV2 & INF A&B AMP PRB: CPT | Performed by: EMERGENCY MEDICINE

## 2022-07-14 PROCEDURE — 93005 ELECTROCARDIOGRAM TRACING: CPT | Performed by: EMERGENCY MEDICINE

## 2022-07-14 PROCEDURE — 83735 ASSAY OF MAGNESIUM: CPT | Performed by: EMERGENCY MEDICINE

## 2022-07-14 PROCEDURE — C9803 HOPD COVID-19 SPEC COLLECT: HCPCS

## 2022-07-14 PROCEDURE — 83880 ASSAY OF NATRIURETIC PEPTIDE: CPT | Performed by: EMERGENCY MEDICINE

## 2022-07-14 PROCEDURE — 84484 ASSAY OF TROPONIN QUANT: CPT | Performed by: EMERGENCY MEDICINE

## 2022-07-14 PROCEDURE — 80053 COMPREHEN METABOLIC PANEL: CPT | Performed by: EMERGENCY MEDICINE

## 2022-07-14 PROCEDURE — 93010 ELECTROCARDIOGRAM REPORT: CPT | Performed by: INTERNAL MEDICINE

## 2022-07-14 PROCEDURE — G0378 HOSPITAL OBSERVATION PER HR: HCPCS

## 2022-07-14 PROCEDURE — 82962 GLUCOSE BLOOD TEST: CPT

## 2022-07-14 PROCEDURE — 96360 HYDRATION IV INFUSION INIT: CPT

## 2022-07-14 PROCEDURE — 85025 COMPLETE CBC W/AUTO DIFF WBC: CPT | Performed by: EMERGENCY MEDICINE

## 2022-07-14 PROCEDURE — 99285 EMERGENCY DEPT VISIT HI MDM: CPT

## 2022-07-14 PROCEDURE — 96361 HYDRATE IV INFUSION ADD-ON: CPT

## 2022-07-14 RX ORDER — SODIUM CHLORIDE 0.9 % (FLUSH) 0.9 %
10 SYRINGE (ML) INJECTION AS NEEDED
Status: DISCONTINUED | OUTPATIENT
Start: 2022-07-14 | End: 2022-07-17 | Stop reason: HOSPADM

## 2022-07-14 RX ADMIN — SODIUM CHLORIDE 1000 ML: 9 INJECTION, SOLUTION INTRAVENOUS at 18:50

## 2022-07-14 NOTE — ED PROVIDER NOTES
Subjective     Illness  Location:  Generalized  Quality:  Patient tested positive for COVID 2 days ago.  Since then has been feeling somewhat short of breath with nausea and decreased appetite and generalized fatigue.  Severity:  Mild  Onset quality:  Gradual  Duration:  3 days  Timing:  Constant  Progression:  Worsening  Chronicity:  New  Context:  History of cardiac transplant in 2002.  Does take immunosuppressive medications.  Relieved by:  Rest  Worsened by:  Movement  Ineffective treatments:  None tried  Associated symptoms: cough, myalgias and shortness of breath    Associated symptoms: no abdominal pain, no chest pain, no diarrhea, no fever and no vomiting    Associated symptoms comment:  Nausea      Review of Systems   Constitutional: Negative for fever.   Respiratory: Positive for cough and shortness of breath.    Cardiovascular: Negative for chest pain.   Gastrointestinal: Negative for abdominal pain, diarrhea and vomiting.   Musculoskeletal: Positive for myalgias.   All other systems reviewed and are negative.      Past Medical History:   Diagnosis Date   • Chronic kidney disease    • Diabetes mellitus (HCC)    • GERD (gastroesophageal reflux disease)    • HFrEF (heart failure with reduced ejection fraction) ([facility])    • Hypertension        Allergies   Allergen Reactions   • Levofloxacin Anaphylaxis       Past Surgical History:   Procedure Laterality Date   • HEART TRANSPLANT     • HERNIA REPAIR         Family History   Problem Relation Age of Onset   • Heart disease Mother    • Heart disease Father    • Heart disease Brother    • Diabetes Brother        Social History     Socioeconomic History   • Marital status:    Tobacco Use   • Smoking status: Former Smoker   • Smokeless tobacco: Former User   Vaping Use   • Vaping Use: Never used   Substance and Sexual Activity   • Alcohol use: Never   • Drug use: Never   • Sexual activity: Defer           Objective   Physical Exam  Vitals and nursing note  reviewed.   Constitutional:       Appearance: He is not ill-appearing.   HENT:      Head: Normocephalic and atraumatic.      Right Ear: External ear normal.      Left Ear: External ear normal.      Nose: Nose normal.      Mouth/Throat:      Mouth: Mucous membranes are dry.   Eyes:      General: No scleral icterus.  Cardiovascular:      Rate and Rhythm: Normal rate and regular rhythm.   Pulmonary:      Effort: Pulmonary effort is normal.      Breath sounds: Normal breath sounds.   Abdominal:      General: Abdomen is flat.      Tenderness: There is no abdominal tenderness.   Musculoskeletal:         General: No signs of injury.   Skin:     General: Skin is warm and dry.   Neurological:      Mental Status: He is alert and oriented to person, place, and time.   Psychiatric:         Behavior: Behavior normal.         Procedures           ED Course                                           MDM  Number of Diagnoses or Management Options     Amount and/or Complexity of Data Reviewed  Clinical lab tests: reviewed  Tests in the medicine section of CPT®: reviewed  Decide to obtain previous medical records or to obtain history from someone other than the patient: yes  Review and summarize past medical records: yes      The patient was able to ambulate with pulse oximetry of 96%.    Orthostatic vital signs indicate orthostatic hypotension with significant blood pressure drop between lying and standing with reproduction of symptoms.    EKG interpretation: Interpreted by myself.  Normal sinus rhythm.  Rate 73.  Normal P wave and WV interval.  Normal QRS and axis.  Normal QTc interval.  ST segments are normal.  Final diagnoses:   SHAQ (acute kidney injury) ([facility])   Dehydration   Orthostatic hypotension   COVID-19       ED Disposition  ED Disposition     ED Disposition   Decision to Admit    Condition   --    Comment   Level of Care: Med/Surg [1]   Diagnosis: SHAQ (acute kidney injury) ([facility]) [576639]   Admitting Physician:  MANDIE CHRISTIANSON [[id number]]   Attending Physician: MANDIE CHRISTIANSON [[id number]]               No follow-up provider specified.       Medication List      No changes were made to your prescriptions during this visit.          Jamari Pearl,   07/14/22 2313

## 2022-07-15 ENCOUNTER — APPOINTMENT (OUTPATIENT)
Dept: ULTRASOUND IMAGING | Facility: HOSPITAL | Age: 77
End: 2022-07-15

## 2022-07-15 LAB
ALBUMIN SERPL-MCNC: 3.9 G/DL (ref 3.5–5.2)
ALBUMIN/GLOB SERPL: 1.1 G/DL
ALP SERPL-CCNC: 102 U/L (ref 39–117)
ALT SERPL W P-5'-P-CCNC: 17 U/L (ref 1–41)
ANION GAP SERPL CALCULATED.3IONS-SCNC: 14 MMOL/L (ref 5–15)
AST SERPL-CCNC: 27 U/L (ref 1–40)
BACTERIA UR QL AUTO: NORMAL /HPF
BASOPHILS # BLD AUTO: 0.02 10*3/MM3 (ref 0–0.2)
BASOPHILS NFR BLD AUTO: 0.4 % (ref 0–1.5)
BILIRUB SERPL-MCNC: 0.2 MG/DL (ref 0–1.2)
BILIRUB UR QL STRIP: NEGATIVE
BUN SERPL-MCNC: 61 MG/DL (ref 8–23)
BUN/CREAT SERPL: 20.5 (ref 7–25)
CALCIUM SPEC-SCNC: 8.2 MG/DL (ref 8.6–10.5)
CHLORIDE SERPL-SCNC: 103 MMOL/L (ref 98–107)
CLARITY UR: CLEAR
CO2 SERPL-SCNC: 21 MMOL/L (ref 22–29)
COLOR UR: YELLOW
CREAT SERPL-MCNC: 2.97 MG/DL (ref 0.76–1.27)
DEPRECATED RDW RBC AUTO: 38.6 FL (ref 37–54)
EGFRCR SERPLBLD CKD-EPI 2021: 20.9 ML/MIN/1.73
EOSINOPHIL # BLD AUTO: 0.15 10*3/MM3 (ref 0–0.4)
EOSINOPHIL NFR BLD AUTO: 2.9 % (ref 0.3–6.2)
ERYTHROCYTE [DISTWIDTH] IN BLOOD BY AUTOMATED COUNT: 13.5 % (ref 12.3–15.4)
GLOBULIN UR ELPH-MCNC: 3.7 GM/DL
GLUCOSE BLDC GLUCOMTR-MCNC: 157 MG/DL (ref 70–130)
GLUCOSE BLDC GLUCOMTR-MCNC: 181 MG/DL (ref 70–130)
GLUCOSE BLDC GLUCOMTR-MCNC: 354 MG/DL (ref 70–130)
GLUCOSE BLDC GLUCOMTR-MCNC: 95 MG/DL (ref 70–130)
GLUCOSE SERPL-MCNC: 230 MG/DL (ref 65–99)
GLUCOSE UR STRIP-MCNC: ABNORMAL MG/DL
HCT VFR BLD AUTO: 33 % (ref 37.5–51)
HGB BLD-MCNC: 11.1 G/DL (ref 13–17.7)
HGB UR QL STRIP.AUTO: NEGATIVE
HOLD SPECIMEN: NORMAL
HYALINE CASTS UR QL AUTO: NORMAL /LPF
IMM GRANULOCYTES # BLD AUTO: 0.02 10*3/MM3 (ref 0–0.05)
IMM GRANULOCYTES NFR BLD AUTO: 0.4 % (ref 0–0.5)
KETONES UR QL STRIP: NEGATIVE
LDH SERPL-CCNC: 183 U/L (ref 135–225)
LEUKOCYTE ESTERASE UR QL STRIP.AUTO: NEGATIVE
LYMPHOCYTES # BLD AUTO: 2.06 10*3/MM3 (ref 0.7–3.1)
LYMPHOCYTES NFR BLD AUTO: 40.4 % (ref 19.6–45.3)
MAGNESIUM SERPL-MCNC: 2.1 MG/DL (ref 1.6–2.4)
MCH RBC QN AUTO: 26.7 PG (ref 26.6–33)
MCHC RBC AUTO-ENTMCNC: 33.6 G/DL (ref 31.5–35.7)
MCV RBC AUTO: 79.3 FL (ref 79–97)
MONOCYTES # BLD AUTO: 0.52 10*3/MM3 (ref 0.1–0.9)
MONOCYTES NFR BLD AUTO: 10.2 % (ref 5–12)
NEUTROPHILS NFR BLD AUTO: 2.33 10*3/MM3 (ref 1.7–7)
NEUTROPHILS NFR BLD AUTO: 45.7 % (ref 42.7–76)
NITRITE UR QL STRIP: NEGATIVE
NRBC BLD AUTO-RTO: 0 /100 WBC (ref 0–0.2)
PH UR STRIP.AUTO: <=5 [PH] (ref 5–9)
PLATELET # BLD AUTO: 233 10*3/MM3 (ref 140–450)
PMV BLD AUTO: 9.8 FL (ref 6–12)
POTASSIUM SERPL-SCNC: 4.8 MMOL/L (ref 3.5–5.2)
PROT SERPL-MCNC: 7.6 G/DL (ref 6–8.5)
PROT UR QL STRIP: ABNORMAL
QT INTERVAL: 418 MS
QTC INTERVAL: 460 MS
RBC # BLD AUTO: 4.16 10*6/MM3 (ref 4.14–5.8)
RBC # UR STRIP: NORMAL /HPF
REF LAB TEST METHOD: NORMAL
SODIUM SERPL-SCNC: 138 MMOL/L (ref 136–145)
SODIUM UR-SCNC: 34 MMOL/L
SP GR UR STRIP: 1.02 (ref 1–1.03)
SQUAMOUS #/AREA URNS HPF: NORMAL /HPF
UROBILINOGEN UR QL STRIP: ABNORMAL
WBC # UR STRIP: NORMAL /HPF
WBC NRBC COR # BLD: 5.1 10*3/MM3 (ref 3.4–10.8)

## 2022-07-15 PROCEDURE — 76775 US EXAM ABDO BACK WALL LIM: CPT

## 2022-07-15 PROCEDURE — G0378 HOSPITAL OBSERVATION PER HR: HCPCS

## 2022-07-15 PROCEDURE — 84156 ASSAY OF PROTEIN URINE: CPT | Performed by: INTERNAL MEDICINE

## 2022-07-15 PROCEDURE — 84300 ASSAY OF URINE SODIUM: CPT | Performed by: INTERNAL MEDICINE

## 2022-07-15 PROCEDURE — 81001 URINALYSIS AUTO W/SCOPE: CPT | Performed by: INTERNAL MEDICINE

## 2022-07-15 PROCEDURE — 63710000001 INSULIN ASPART PER 5 UNITS: Performed by: HOSPITALIST

## 2022-07-15 PROCEDURE — 63710000001 INSULIN DETEMIR PER 5 UNITS: Performed by: HOSPITALIST

## 2022-07-15 PROCEDURE — 82570 ASSAY OF URINE CREATININE: CPT | Performed by: INTERNAL MEDICINE

## 2022-07-15 PROCEDURE — 83615 LACTATE (LD) (LDH) ENZYME: CPT | Performed by: HOSPITALIST

## 2022-07-15 PROCEDURE — 80053 COMPREHEN METABOLIC PANEL: CPT | Performed by: HOSPITALIST

## 2022-07-15 PROCEDURE — 87086 URINE CULTURE/COLONY COUNT: CPT | Performed by: NURSE PRACTITIONER

## 2022-07-15 PROCEDURE — 82962 GLUCOSE BLOOD TEST: CPT

## 2022-07-15 PROCEDURE — 36415 COLL VENOUS BLD VENIPUNCTURE: CPT | Performed by: HOSPITALIST

## 2022-07-15 PROCEDURE — 85025 COMPLETE CBC W/AUTO DIFF WBC: CPT | Performed by: HOSPITALIST

## 2022-07-15 PROCEDURE — 63710000001 AZATHIOPRINE PER 50 MG: Performed by: HOSPITALIST

## 2022-07-15 PROCEDURE — 63710000001 CYCLOSPORINE MODIFIED PER 100 MG: Performed by: HOSPITALIST

## 2022-07-15 PROCEDURE — 83735 ASSAY OF MAGNESIUM: CPT | Performed by: HOSPITALIST

## 2022-07-15 PROCEDURE — 80158 DRUG ASSAY CYCLOSPORINE: CPT | Performed by: HOSPITALIST

## 2022-07-15 RX ORDER — ROSUVASTATIN CALCIUM 20 MG/1
20 TABLET, COATED ORAL NIGHTLY
Status: DISCONTINUED | OUTPATIENT
Start: 2022-07-15 | End: 2022-07-17 | Stop reason: HOSPADM

## 2022-07-15 RX ORDER — GABAPENTIN 100 MG/1
200 CAPSULE ORAL 3 TIMES DAILY
Status: DISCONTINUED | OUTPATIENT
Start: 2022-07-15 | End: 2022-07-17 | Stop reason: HOSPADM

## 2022-07-15 RX ORDER — INSULIN ASPART 100 [IU]/ML
0-7 INJECTION, SOLUTION INTRAVENOUS; SUBCUTANEOUS
Status: DISCONTINUED | OUTPATIENT
Start: 2022-07-15 | End: 2022-07-17 | Stop reason: HOSPADM

## 2022-07-15 RX ORDER — AMLODIPINE BESYLATE 10 MG/1
10 TABLET ORAL EVERY 24 HOURS
Status: DISCONTINUED | OUTPATIENT
Start: 2022-07-15 | End: 2022-07-17 | Stop reason: HOSPADM

## 2022-07-15 RX ORDER — METOPROLOL SUCCINATE 50 MG/1
50 TABLET, EXTENDED RELEASE ORAL DAILY
Status: DISCONTINUED | OUTPATIENT
Start: 2022-07-15 | End: 2022-07-17 | Stop reason: HOSPADM

## 2022-07-15 RX ORDER — SODIUM CHLORIDE 9 MG/ML
75 INJECTION, SOLUTION INTRAVENOUS CONTINUOUS
Status: DISCONTINUED | OUTPATIENT
Start: 2022-07-15 | End: 2022-07-17

## 2022-07-15 RX ORDER — INSULIN ASPART 100 [IU]/ML
15 INJECTION, SOLUTION INTRAVENOUS; SUBCUTANEOUS
Status: DISCONTINUED | OUTPATIENT
Start: 2022-07-15 | End: 2022-07-17 | Stop reason: HOSPADM

## 2022-07-15 RX ORDER — ISOSORBIDE DINITRATE 20 MG/1
20 TABLET ORAL
Status: DISCONTINUED | OUTPATIENT
Start: 2022-07-15 | End: 2022-07-17 | Stop reason: HOSPADM

## 2022-07-15 RX ORDER — NICOTINE POLACRILEX 4 MG
15 LOZENGE BUCCAL
Status: DISCONTINUED | OUTPATIENT
Start: 2022-07-15 | End: 2022-07-17 | Stop reason: HOSPADM

## 2022-07-15 RX ORDER — DEXTROSE MONOHYDRATE 25 G/50ML
25 INJECTION, SOLUTION INTRAVENOUS
Status: DISCONTINUED | OUTPATIENT
Start: 2022-07-15 | End: 2022-07-17 | Stop reason: HOSPADM

## 2022-07-15 RX ORDER — TERAZOSIN 5 MG/1
5 CAPSULE ORAL EVERY 12 HOURS SCHEDULED
Status: DISCONTINUED | OUTPATIENT
Start: 2022-07-15 | End: 2022-07-17 | Stop reason: HOSPADM

## 2022-07-15 RX ORDER — SENNA PLUS 8.6 MG/1
1 TABLET ORAL NIGHTLY PRN
Status: DISCONTINUED | OUTPATIENT
Start: 2022-07-15 | End: 2022-07-17 | Stop reason: HOSPADM

## 2022-07-15 RX ORDER — DOCUSATE SODIUM 100 MG/1
100 CAPSULE, LIQUID FILLED ORAL DAILY PRN
Status: DISCONTINUED | OUTPATIENT
Start: 2022-07-15 | End: 2022-07-17 | Stop reason: HOSPADM

## 2022-07-15 RX ORDER — AZATHIOPRINE 50 MG/1
100 TABLET ORAL DAILY
Status: DISCONTINUED | OUTPATIENT
Start: 2022-07-15 | End: 2022-07-17 | Stop reason: HOSPADM

## 2022-07-15 RX ORDER — CYCLOSPORINE 100 MG/1
100 CAPSULE, LIQUID FILLED ORAL 2 TIMES DAILY
Status: DISCONTINUED | OUTPATIENT
Start: 2022-07-15 | End: 2022-07-17 | Stop reason: HOSPADM

## 2022-07-15 RX ORDER — INSULIN ASPART 100 [IU]/ML
17 INJECTION, SOLUTION INTRAVENOUS; SUBCUTANEOUS ONCE
Status: COMPLETED | OUTPATIENT
Start: 2022-07-15 | End: 2022-07-15

## 2022-07-15 RX ADMIN — CYCLOSPORINE 100 MG: 100 CAPSULE, LIQUID FILLED ORAL at 08:51

## 2022-07-15 RX ADMIN — AMLODIPINE BESYLATE 10 MG: 10 TABLET ORAL at 12:18

## 2022-07-15 RX ADMIN — ROSUVASTATIN CALCIUM 20 MG: 20 TABLET, FILM COATED ORAL at 22:55

## 2022-07-15 RX ADMIN — METOPROLOL SUCCINATE 50 MG: 50 TABLET, EXTENDED RELEASE ORAL at 08:50

## 2022-07-15 RX ADMIN — TERAZOSIN HYDROCHLORIDE 5 MG: 5 CAPSULE ORAL at 08:51

## 2022-07-15 RX ADMIN — GABAPENTIN 200 MG: 100 CAPSULE ORAL at 08:50

## 2022-07-15 RX ADMIN — GABAPENTIN 200 MG: 100 CAPSULE ORAL at 15:18

## 2022-07-15 RX ADMIN — ISOSORBIDE DINITRATE 20 MG: 20 TABLET ORAL at 15:18

## 2022-07-15 RX ADMIN — CYCLOSPORINE 100 MG: 100 CAPSULE, LIQUID FILLED ORAL at 20:18

## 2022-07-15 RX ADMIN — INSULIN DETEMIR 25 UNITS: 100 INJECTION, SOLUTION SUBCUTANEOUS at 11:49

## 2022-07-15 RX ADMIN — AZATHIOPRINE 100 MG: 50 TABLET ORAL at 08:51

## 2022-07-15 RX ADMIN — GABAPENTIN 200 MG: 100 CAPSULE ORAL at 20:18

## 2022-07-15 RX ADMIN — INSULIN ASPART 15 UNITS: 100 INJECTION, SOLUTION INTRAVENOUS; SUBCUTANEOUS at 12:04

## 2022-07-15 RX ADMIN — INSULIN DETEMIR 20 UNITS: 100 INJECTION, SOLUTION SUBCUTANEOUS at 03:40

## 2022-07-15 RX ADMIN — ISOSORBIDE DINITRATE 20 MG: 20 TABLET ORAL at 20:18

## 2022-07-15 RX ADMIN — ISOSORBIDE DINITRATE 20 MG: 20 TABLET ORAL at 08:51

## 2022-07-15 RX ADMIN — INSULIN ASPART 17 UNITS: 100 INJECTION, SOLUTION INTRAVENOUS; SUBCUTANEOUS at 09:34

## 2022-07-15 RX ADMIN — SODIUM CHLORIDE 75 ML/HR: 9 INJECTION, SOLUTION INTRAVENOUS at 12:18

## 2022-07-15 NOTE — PROGRESS NOTES
[facility] Medicine Services  INPATIENT PROGRESS NOTE    Length of Stay: 0  Date of Admission: 7/14/2022  Primary Care Physician: Cam Cortez MD    Subjective   Chief Complaint: Cough, sore throat and fatigue    HPI:      7/15/22: Afebrile.  Nephrology consulted.  Received 2 L IV bolus yesterday.    Review of Systems:  As per history of present illness and a complete 12 point review of systems is otherwise negative.     Objective    Temp:  [98.1 °F (36.7 °C)] 98.1 °F (36.7 °C)  Heart Rate:  [68-94] 86  Resp:  [18] 18  BP: ()/(50-84) 153/79    Physical Exam:  Gen.: Appears as stated age.  No acute distress.  HEENT: EOMI.  PERRLA.  Sclerae anicteric.  Moist mucous membranes.  Neck: Supple.  No JVD.  No thyromegaly.  Chest: Clear to auscultation bilaterally.  No wheeze, rhonchi or rales.  Heart: Regular rhythm and rate.  No murmurs, rubs or gallops.  Abdomen: Normoactive bowel sounds.  Nontender.  Nondistended.  No guarding.   Neurological: Cranial nerves II through XII are grossly intact.  No cerebellar signs.   Extremities: Good range of motion throughout.  No cyanosis, clubbing or edema.  Skin: Warm.  No rashes.  No ulcers.  Psychiatry: Cooperative.    Results Review:  I have reviewed the labs, radiology results, and diagnostic studies.    Laboratory Data:   Results from last 7 days   Lab Units 07/15/22  0603 07/14/22  1730   SODIUM mmol/L 138 133*   POTASSIUM mmol/L 4.8 4.8   CHLORIDE mmol/L 103 96*   CO2 mmol/L 21.0* 21.0*   BUN mg/dL 61* 69*   CREATININE mg/dL 2.97* 3.82*   GLUCOSE mg/dL 230* 282*   CALCIUM mg/dL 8.2* 7.9*   BILIRUBIN mg/dL 0.2 <0.2   ALK PHOS U/L 102 94   ALT (SGPT) U/L 17 17   AST (SGOT) U/L 27 26   ANION GAP mmol/L 14.0 16.0*     Estimated Creatinine Clearance: 21.4 mL/min (A) (by C-G formula based on SCr of 2.97 mg/dL (H)).  Results from last 7 days   Lab Units 07/15/22  0918 07/14/22  1730   MAGNESIUM mg/dL 2.1 2.1         Results from  last 7 days   Lab Units 07/15/22  0918 07/14/22  1730   WBC 10*3/mm3 5.10 5.67   HEMOGLOBIN g/dL 11.1* 9.6*   HEMATOCRIT % 33.0* 27.7*   PLATELETS 10*3/mm3 233 208           Culture Data:   No results found for: BLOODCX  No results found for: URINECX  No results found for: RESPCX  No results found for: WOUNDCX  No results found for: STOOLCX  No components found for: BODYFLD    Radiology Data:   Imaging Results (Last 24 Hours)     ** No results found for the last 24 hours. **          Scheduled Meds:amLODIPine, 10 mg, Oral, Q24H  azaTHIOprine, 100 mg, Oral, Daily  cycloSPORINE modified, 100 mg, Oral, BID  gabapentin, 200 mg, Oral, TID  Insulin Aspart, 0-7 Units, Subcutaneous, TID AC  Insulin Aspart, 15 Units, Subcutaneous, TID With Meals  insulin detemir, 25 Units, Subcutaneous, Q12H  isosorbide dinitrate, 20 mg, Oral, TID - Nitrates  metoprolol succinate XL, 50 mg, Oral, Daily  rosuvastatin, 20 mg, Oral, Nightly  terazosin, 5 mg, Oral, Q12H      Continuous Infusions:sodium chloride, 75 mL/hr, Last Rate: 75 mL/hr (07/15/22 1519)      PRN Meds:.dextrose  •  dextrose  •  docusate sodium  •  glucagon (human recombinant)  •  senna  •  sodium chloride    Assessment/Plan     Principal Problem:    SHAQ (acute kidney injury) (HCC)  Active Problems:    Chronic kidney disease    GERD (gastroesophageal reflux disease)    Hypertension    COVID-19 complicated with SHAQ on CKD stage III:  -s/p IV fluid bolus in the ED  -Consulted nephrology  -Nephrotoxin hold  -Follows with nephrologist in [place]     Heart transplant:  -Continue home Imuran and cyclosporine  -Cyclosporine level ordered  -Last TTE LVEF 61 to 65%.     IDDM with peripheral neuropathy:  -Continue Levemir and aspart at a lower dose  -Continue ISS and Neurontin     Hyperlipidemia:  -Continue statin     Hypertension:  -Continue amlodipine      Discharge Planning: I expect patient to be discharged to 1 in 2 days.    López Ramirez DO  07/15/22  15:40 CDT

## 2022-07-15 NOTE — CONSULTS
[facility]  [address] [place] [address]. [address]   - [phone]  F  [phone]     Consultation         PATIENT  DEMOGRAPHICS   PATIENT NAME: Román Corey                      PHYSICIAN: Lalita Orosco MD  : [id number]  MRN: [id number]    Subjective   SUBJECTIVE   Referring Provider: Dr Bhat  Reason for Consultation: CKD management   History of present illness:      The patient is a 77-year-old vaccinated/boosted male with history of heart transplant on cyclosporine and Imuran, IDDM, CVA, CKD stage III, hypertension and AGUILAR.     The patient experience sore throat, yellow productive cough and fatigue.  He took home COVID-19 test and was positive. He is currently on room air. He received 2 liter fluid in the ER      Lab work in the ED shows BUN 69, creatinine 3.82.     Concurrent Medical History:  has a past medical history of Chronic kidney disease, Diabetes mellitus (HCC), GERD (gastroesophageal reflux disease), HFrEF (heart failure with reduced ejection fraction) (HCC), and Hypertension.        Past Medical History:   Diagnosis Date   • Chronic kidney disease    • Diabetes mellitus (HCC)    • GERD (gastroesophageal reflux disease)    • HFrEF (heart failure with reduced ejection fraction) (HCC)    • Hypertension      Past Surgical History:   Procedure Laterality Date   • HEART TRANSPLANT     • HERNIA REPAIR       Family History   Problem Relation Age of Onset   • Heart disease Mother    • Heart disease Father    • Heart disease Brother    • Diabetes Brother      Social History     Tobacco Use   • Smoking status: Former Smoker   • Smokeless tobacco: Former User   Vaping Use   • Vaping Use: Never used   Substance Use Topics   • Alcohol use: Never   • Drug use: Never     Allergies:  Levofloxacin     REVIEW OF SYSTEMS    Review of Systems   All other systems reviewed and are negative.      Objective   OBJECTIVE   Vital Signs  Heart Rate:  [68-94] 86  Resp:  [18] 18  BP:  "(93178)/(54-84) 110/58    Flowsheet Rows    Flowsheet Row First Filed Value   Admission Height 170.2 cm (67\") Documented at 07/14/2022 1651   Admission Weight 85.3 kg (188 lb) Documented at 07/14/2022 1651           I/O last 3 completed shifts:  In: -   Out: 2550 [Urine:2550]    PHYSICAL EXAM    Physical Exam  Constitutional:       Appearance: Normal appearance.   HENT:      Head: Normocephalic.      Nose: Nose normal.      Mouth/Throat:      Mouth: Mucous membranes are moist.   Cardiovascular:      Rate and Rhythm: Normal rate and regular rhythm.   Pulmonary:      Effort: Pulmonary effort is normal.      Breath sounds: Normal breath sounds.   Abdominal:      General: Bowel sounds are normal.      Palpations: Abdomen is soft.   Musculoskeletal:         General: Normal range of motion.      Cervical back: Normal range of motion.   Skin:     General: Skin is warm.   Neurological:      General: No focal deficit present.      Mental Status: He is alert.   Psychiatric:         Mood and Affect: Mood normal.         RESULTS   Results Review:    Results from last 7 days   Lab Units 07/15/22  0603 07/14/22  1730   SODIUM mmol/L 138 133*   POTASSIUM mmol/L 4.8 4.8   CHLORIDE mmol/L 103 96*   CO2 mmol/L 21.0* 21.0*   BUN mg/dL 61* 69*   CREATININE mg/dL 2.97* 3.82*   CALCIUM mg/dL 8.2* 7.9*   BILIRUBIN mg/dL 0.2 <0.2   ALK PHOS U/L 102 94   ALT (SGPT) U/L 17 17   AST (SGOT) U/L 27 26   GLUCOSE mg/dL 230* 282*       Estimated Creatinine Clearance: 21.4 mL/min (A) (by C-G formula based on SCr of 2.97 mg/dL (H)).    Results from last 7 days   Lab Units 07/15/22  0918 07/14/22  1730   MAGNESIUM mg/dL 2.1 2.1             Results from last 7 days   Lab Units 07/15/22  0918 07/14/22  1730   WBC 10*3/mm3 5.10 5.67   HEMOGLOBIN g/dL 11.1* 9.6*   PLATELETS 10*3/mm3 233 208              MEDICATIONS    amLODIPine, 10 mg, Oral, Q24H  azaTHIOprine, 100 mg, Oral, Daily  cycloSPORINE modified, 100 mg, Oral, BID  gabapentin, 200 mg, Oral, " TID  Insulin Aspart, 0-7 Units, Subcutaneous, TID AC  Insulin Aspart, 15 Units, Subcutaneous, TID With Meals  insulin detemir, 25 Units, Subcutaneous, Q12H  isosorbide dinitrate, 20 mg, Oral, TID - Nitrates  metoprolol succinate XL, 50 mg, Oral, Daily  rosuvastatin, 20 mg, Oral, Nightly  terazosin, 5 mg, Oral, Q12H      sodium chloride, 75 mL/hr, Last Rate: 75 mL/hr (07/15/22 1519)      (Not in a hospital admission)    Assessment & Plan   ASSESSMENT / PLAN      SHAQ (acute kidney injury) (HCC)    Chronic kidney disease    GERD (gastroesophageal reflux disease)    Hypertension    1. SHAQ on CKD stage 3b  Baseline cr 1.9-2.1 he came with a cr of 3.87  Cr down to from 3.8 to 2.9 with fluids   SHAQ likely from dehydration  Start NS at 75 cc/hour  Will check cyclosporine levels   Check urine studies and kidney ultrasound     2. Heart transplant on immunosuppression  On cyclosporine 100 mg bid , imuran 100 mg qd , continue      3. HLD   On crestor      4. Covid pna     5. HTN   On metoprolol and isosorbide            I discussed the patients findings and my recommendations with patient    This document has been electronically signed by Lalita Orosco MD on July 15, 2022 17:21 CDT

## 2022-07-15 NOTE — H&P
[facility] Medicine Admission      Date of Admission: 7/14/2022      Primary Care Physician: Cam Cortez MD      Chief Complaint: cough, sore throat and fatigue    HPI:    The patient is a 77-year-old vaccinated/boosted male with history of heart transplant on cyclosporine and Imuran, IDDM, CVA, CKD stage III, hypertension and AGUILAR.    The patient had traveled to [place] recently and 3 days after coming back to [place] started to experience sore throat, yellow productive cough and fatigue.  He took home COVID-19 test and was positive.    Lab work in the ED shows BUN 69, creatinine 3.82.    Concurrent Medical History:  has a past medical history of Chronic kidney disease, Diabetes mellitus (HCC), GERD (gastroesophageal reflux disease), HFrEF (heart failure with reduced ejection fraction) ([facility]), and Hypertension.    Past Surgical History:  has a past surgical history that includes Heart transplant and Hernia repair.    Family History: family history includes Diabetes in his brother; Heart disease in his brother, father, and mother.     Social History:  reports that he has quit smoking. He has quit using smokeless tobacco. He reports that he does not drink alcohol and does not use drugs.    Allergies:   Allergies   Allergen Reactions   • Levofloxacin Anaphylaxis       Medications:   Prior to Admission medications    Medication Sig Start Date End Date Taking? Authorizing Provider   amLODIPine (NORVASC) 10 MG tablet Take 10 mg by mouth Daily. At lunch    Shabana Khan MD   azaTHIOprine (IMURAN) 50 MG tablet Take 100 mg by mouth Daily.    Shabana Khan MD   calcitriol (ROCALTROL) 0.25 MCG capsule Take 0.25 mcg by mouth Every Other Day.    Shabana Khan MD   calcium-vitamin D (OSCAL-500) 500-200 MG-UNIT per tablet Take 1 tablet by mouth Daily.    Shabana Khan MD   cycloSPORINE modified (NEORAL) 100 MG capsule Take 100 mg by mouth 2  (Two) Times a Day.    Shabana Khan MD   docusate sodium (COLACE) 100 MG capsule Take 100 mg by mouth As Needed for Constipation.    Shabana Khan MD   doxazosin (CARDURA) 8 MG tablet Take 8 mg by mouth 2 (Two) Times a Day.    Shabana Khan MD   ezetimibe (ZETIA) 10 MG tablet Take 10 mg by mouth Every Night. 10/4/18   Shabana Khan MD   furosemide (LASIX) 40 MG tablet Take 0.5 tablets by mouth Daily. 12/17/21   Rivka Knox APRN   gabapentin (NEURONTIN) 100 MG capsule Take 200 mg by mouth 3 (Three) Times a Day. 10/4/18   Shabana Khan MD   insulin aspart (novoLOG) 100 UNIT/ML injection Inject 23 Units under the skin into the appropriate area as directed 3 (Three) Times a Day.    Shabana Khan MD   insulin glargine (LANTUS) 100 UNIT/ML injection Inject 30 Units under the skin into the appropriate area as directed 2 (Two) Times a Day. 10/4/18   Shabana Khan MD   isosorbide dinitrate (ISORDIL) 20 MG tablet Take 20 mg by mouth 3 (Three) Times a Day. 8:00 am, 1:00 pm' and 6:00 pm 10/4/18   Shabana Khan MD   metoprolol succinate XL (TOPROL-XL) 50 MG 24 hr tablet Take 50 mg by mouth Daily. 10/5/18   Shabana Khan MD   multivitamin with minerals tablet tablet Take 1 tablet by mouth Daily.    Shabana Khan MD   Omega-3 Fatty Acids (FISH OIL) 1000 MG capsule capsule Take 1 capsule by mouth 2 (Two) Times a Day With Meals. 10/4/18   Shabana Khan MD   POTASSIUM CHLORIDE PO Take 40 mEq by mouth Every Night.    Shabana Khan MD   rosuvastatin (CRESTOR) 20 MG tablet Take 1 tablet by mouth Every Night. 2/12/22   Samantha Ko MD   Sennosides 15 MG chewable tablet Chew 2 tablets 2 (Two) Times a Day As Needed.    Shabana Khan MD       Review of Systems:  As per history of present illness and a complete 12 point review of systems is otherwise negative.     Physical Exam:   Temp:  [98.1 °F (36.7 °C)] 98.1 °F (36.7  °C)  Heart Rate:  [72-89] 74  Resp:  [18] 18  BP: ()/(50-67) 93/54    Gen.: Appears as stated age.  No acute distress.  HEENT: EOMI.  PERRLA.  Sclerae anicteric.  Moist mucous membranes.  Neck: Supple.  No JVD.  No thyromegaly.  Chest: Clear to auscultation bilaterally.  No wheeze, rhonchi or rales.  Heart: Regular rhythm and rate.  No murmurs, rubs or gallops.  Abdomen: Normoactive bowel sounds.  Nontender.  Nondistended.  No guarding.   Neurological: Cranial nerves II through XII are grossly intact.  No cerebellar signs.   Extremities: Pedal edema  Skin: Warm.  No rashes.  No ulcers.  Psychiatry: Cooperative.        Results Reviewed:  I have personally reviewed current lab, radiology, and data and agree with results.  Lab Results (last 24 hours)     Procedure Component Value Units Date/Time    Breeding Draw [[id number]] Collected: 07/14/22 1730    Specimen: Blood Updated: 07/14/22 1833    Narrative:      The following orders were created for panel order Breeding Draw.  Procedure                               Abnormality         Status                     ---------                               -----------         ------                     Green Top (Gel)[[id number]]                                  Final result               Lavender Top[[id number]]                                     Final result               Gold Top - SST[[id number]]                                   Final result               Light Blue Top[[id number]]                                   Final result                 Please view results for these tests on the individual orders.    Green Top (Gel) [[id number]] Collected: 07/14/22 1730    Specimen: Blood Updated: 07/14/22 1833     Extra Tube Hold for add-ons.     Comment: Auto resulted.       Lavender Top [[id number]] Collected: 07/14/22 1730    Specimen: Blood Updated: 07/14/22 1833     Extra Tube hold for add-on     Comment: Auto resulted       Gold Top - SST [[id number]] Collected: 07/14/22 1730     Specimen: Blood Updated: 07/14/22 1833     Extra Tube Hold for add-ons.     Comment: Auto resulted.       Light Blue Top [[id number]] Collected: 07/14/22 1730    Specimen: Blood Updated: 07/14/22 1833     Extra Tube Hold for add-ons.     Comment: Auto resulted       COVID-19 and FLU A/B PCR - Swab, Nasopharynx [[id number]]  (Abnormal) Collected: 07/14/22 1726    Specimen: Swab from Nasopharynx Updated: 07/14/22 1815     COVID19 Detected     Influenza A PCR Not Detected     Influenza B PCR Not Detected    Narrative:      Fact sheet for providers: https://www.fda.gov/media/765493/download    Fact sheet for patients: https://www.fda.gov/media/476543/download    Test performed by PCR.  Influenza A and Influenza B negative results should be considered presumptive in samples that have a positive SARS-CoV-2 result.    Competitive inhibition studies showed that SARS-CoV-2 virus, when present at concentrations above 3.6E+04 copies/mL, can inhibit the detection and amplification of influenza A and influenza B virus RNA if present at or below 1.8E+02 copies/mL or 4.9E+02 copies/mL, respectively, and may lead to false negative influenza virus results. If co-infection with influenza A or influenza B virus is suspected in samples with a positive SARS-CoV-2 result, the sample should be re-tested with another FDA cleared, approved, or authorized influenza test, if influenza virus detection would change clinical management.    Comprehensive Metabolic Panel [[id number]]  (Abnormal) Collected: 07/14/22 1730    Specimen: Blood Updated: 07/14/22 1803     Glucose 282 mg/dL      BUN 69 mg/dL      Creatinine 3.82 mg/dL      Sodium 133 mmol/L      Potassium 4.8 mmol/L      Chloride 96 mmol/L      CO2 21.0 mmol/L      Calcium 7.9 mg/dL      Total Protein 7.0 g/dL      Albumin 3.60 g/dL      ALT (SGPT) 17 U/L      AST (SGOT) 26 U/L      Alkaline Phosphatase 94 U/L      Total Bilirubin <0.2 mg/dL      Globulin 3.4 gm/dL      A/G Ratio 1.1 g/dL       BUN/Creatinine Ratio 18.1     Anion Gap 16.0 mmol/L      eGFR 15.4 mL/min/1.73      Comment: National Kidney Foundation and American Society of Nephrology (ASN) Task Force recommended calculation based on the Chronic Kidney Disease Epidemiology Collaboration (CKD-EPI) equation refit without adjustment for race.       Narrative:      GFR Normal >60  Chronic Kidney Disease <60  Kidney Failure <15      Magnesium [[id number]]  (Normal) Collected: 07/14/22 1730    Specimen: Blood Updated: 07/14/22 1803     Magnesium 2.1 mg/dL     Troponin [[id number]]  (Normal) Collected: 07/14/22 1730    Specimen: Blood Updated: 07/14/22 1801     Troponin T <0.010 ng/mL     Narrative:      Troponin T Reference Range:  <= 0.03 ng/mL-   Negative for AMI  >0.03 ng/mL-     Abnormal for myocardial necrosis.  Clinicians would have to utilize clinical acumen, EKG, Troponin and serial changes to determine if it is an Acute Myocardial Infarction or myocardial injury due to an underlying chronic condition.       Results may be falsely decreased if patient taking Biotin.      BNP [[id number]]  (Normal) Collected: 07/14/22 1730    Specimen: Blood Updated: 07/14/22 1801     proBNP 678.5 pg/mL     Narrative:      Among patients with dyspnea, NT-proBNP is highly sensitive for the detection of acute congestive heart failure. In addition NT-proBNP of <300 pg/ml effectively rules out acute congestive heart failure with 99% negative predictive value.    Results may be falsely decreased if patient taking Biotin.      POC Glucose Once [[id number]]  (Abnormal) Collected: 07/14/22 1735    Specimen: Blood Updated: 07/14/22 1748     Glucose 298 mg/dL      Comment: RN NotifiedOperator: [id number] MELISSA TAYLORMeter ID: [id number]       CBC & Differential [[id number]]  (Abnormal) Collected: 07/14/22 1730    Specimen: Blood Updated: 07/14/22 1743    Narrative:      The following orders were created for panel order CBC & Differential.  Procedure                                Abnormality         Status                     ---------                               -----------         ------                     CBC Auto Differential[[id number]]        Abnormal            Final result                 Please view results for these tests on the individual orders.    CBC Auto Differential [[id number]]  (Abnormal) Collected: 07/14/22 1730    Specimen: Blood Updated: 07/14/22 1743     WBC 5.67 10*3/mm3      RBC 3.54 10*6/mm3      Hemoglobin 9.6 g/dL      Hematocrit 27.7 %      MCV 78.2 fL      MCH 27.1 pg      MCHC 34.7 g/dL      RDW 13.2 %      RDW-SD 38.1 fl      MPV 9.8 fL      Platelets 208 10*3/mm3      Neutrophil % 48.6 %      Lymphocyte % 37.0 %      Monocyte % 11.8 %      Eosinophil % 1.8 %      Basophil % 0.4 %      Immature Grans % 0.4 %      Neutrophils, Absolute 2.76 10*3/mm3      Lymphocytes, Absolute 2.10 10*3/mm3      Monocytes, Absolute 0.67 10*3/mm3      Eosinophils, Absolute 0.10 10*3/mm3      Basophils, Absolute 0.02 10*3/mm3      Immature Grans, Absolute 0.02 10*3/mm3      nRBC 0.0 /100 WBC         Imaging Results (Last 24 Hours)     ** No results found for the last 24 hours. **            Assessment and Plan:    Principal Problem:    SHAQ (acute kidney injury) (HCC)  Active Problems:    Chronic kidney disease    GERD (gastroesophageal reflux disease)    Hypertension    COVID-19 complicated with SHAQ on CKD stage III:  -s/p IV fluid bolus in the ED  -Consulted nephrology  -Nephrotoxin hold  -Follows with nephrologist in [place]    Heart transplant:  -Continue home Imuran and cyclosporine  -Cyclosporine level in the morning  -Last TTE LVEF 61 to 65%.    IDDM with peripheral neuropathy:  -Continue Levemir and aspart at a lower dose  -Continue ISS and Neurontin    Hyperlipidemia:  -Continue statin    Hypertension:  -Continue amlodipine        López Ramirez DO  07/14/22  19:22 CDT

## 2022-07-16 LAB
ANION GAP SERPL CALCULATED.3IONS-SCNC: 9 MMOL/L (ref 5–15)
BASOPHILS # BLD AUTO: 0.01 10*3/MM3 (ref 0–0.2)
BASOPHILS NFR BLD AUTO: 0.2 % (ref 0–1.5)
BUN SERPL-MCNC: 57 MG/DL (ref 8–23)
BUN/CREAT SERPL: 24.7 (ref 7–25)
CALCIUM SPEC-SCNC: 7.9 MG/DL (ref 8.6–10.5)
CHLORIDE SERPL-SCNC: 110 MMOL/L (ref 98–107)
CO2 SERPL-SCNC: 23 MMOL/L (ref 22–29)
CREAT SERPL-MCNC: 2.31 MG/DL (ref 0.76–1.27)
CREAT UR-MCNC: 101.5 MG/DL
DEPRECATED RDW RBC AUTO: 38.6 FL (ref 37–54)
EGFRCR SERPLBLD CKD-EPI 2021: 28.2 ML/MIN/1.73
EOSINOPHIL # BLD AUTO: 0.19 10*3/MM3 (ref 0–0.4)
EOSINOPHIL NFR BLD AUTO: 3.9 % (ref 0.3–6.2)
ERYTHROCYTE [DISTWIDTH] IN BLOOD BY AUTOMATED COUNT: 13.4 % (ref 12.3–15.4)
GLUCOSE BLDC GLUCOMTR-MCNC: 216 MG/DL (ref 70–130)
GLUCOSE BLDC GLUCOMTR-MCNC: 256 MG/DL (ref 70–130)
GLUCOSE BLDC GLUCOMTR-MCNC: 305 MG/DL (ref 70–130)
GLUCOSE BLDC GLUCOMTR-MCNC: 87 MG/DL (ref 70–130)
GLUCOSE SERPL-MCNC: 76 MG/DL (ref 65–99)
HCT VFR BLD AUTO: 29 % (ref 37.5–51)
HGB BLD-MCNC: 10 G/DL (ref 13–17.7)
IMM GRANULOCYTES # BLD AUTO: 0.01 10*3/MM3 (ref 0–0.05)
IMM GRANULOCYTES NFR BLD AUTO: 0.2 % (ref 0–0.5)
LYMPHOCYTES # BLD AUTO: 2.26 10*3/MM3 (ref 0.7–3.1)
LYMPHOCYTES NFR BLD AUTO: 46.1 % (ref 19.6–45.3)
MAGNESIUM SERPL-MCNC: 2.3 MG/DL (ref 1.6–2.4)
MCH RBC QN AUTO: 27.5 PG (ref 26.6–33)
MCHC RBC AUTO-ENTMCNC: 34.5 G/DL (ref 31.5–35.7)
MCV RBC AUTO: 79.7 FL (ref 79–97)
MONOCYTES # BLD AUTO: 0.5 10*3/MM3 (ref 0.1–0.9)
MONOCYTES NFR BLD AUTO: 10.2 % (ref 5–12)
NEUTROPHILS NFR BLD AUTO: 1.93 10*3/MM3 (ref 1.7–7)
NEUTROPHILS NFR BLD AUTO: 39.4 % (ref 42.7–76)
NRBC BLD AUTO-RTO: 0 /100 WBC (ref 0–0.2)
PHOSPHATE SERPL-MCNC: 4 MG/DL (ref 2.5–4.5)
PLATELET # BLD AUTO: 217 10*3/MM3 (ref 140–450)
PMV BLD AUTO: 9.8 FL (ref 6–12)
POTASSIUM SERPL-SCNC: 4.5 MMOL/L (ref 3.5–5.2)
PROT ?TM UR-MCNC: 24.2 MG/DL
PROT/CREAT UR: 238.4 MG/G CREA (ref 0–200)
RBC # BLD AUTO: 3.64 10*6/MM3 (ref 4.14–5.8)
SODIUM SERPL-SCNC: 142 MMOL/L (ref 136–145)
WBC NRBC COR # BLD: 4.9 10*3/MM3 (ref 3.4–10.8)

## 2022-07-16 PROCEDURE — 63710000001 CYCLOSPORINE MODIFIED PER 100 MG: Performed by: HOSPITALIST

## 2022-07-16 PROCEDURE — 96372 THER/PROPH/DIAG INJ SC/IM: CPT

## 2022-07-16 PROCEDURE — 25010000002 ENOXAPARIN PER 10 MG: Performed by: HOSPITALIST

## 2022-07-16 PROCEDURE — 85025 COMPLETE CBC W/AUTO DIFF WBC: CPT | Performed by: HOSPITALIST

## 2022-07-16 PROCEDURE — 84100 ASSAY OF PHOSPHORUS: CPT | Performed by: HOSPITALIST

## 2022-07-16 PROCEDURE — 63710000001 INSULIN DETEMIR PER 5 UNITS: Performed by: HOSPITALIST

## 2022-07-16 PROCEDURE — 83735 ASSAY OF MAGNESIUM: CPT | Performed by: HOSPITALIST

## 2022-07-16 PROCEDURE — 80048 BASIC METABOLIC PNL TOTAL CA: CPT | Performed by: HOSPITALIST

## 2022-07-16 PROCEDURE — G0378 HOSPITAL OBSERVATION PER HR: HCPCS

## 2022-07-16 PROCEDURE — 63710000001 AZATHIOPRINE PER 50 MG: Performed by: HOSPITALIST

## 2022-07-16 PROCEDURE — 36415 COLL VENOUS BLD VENIPUNCTURE: CPT | Performed by: HOSPITALIST

## 2022-07-16 PROCEDURE — 82962 GLUCOSE BLOOD TEST: CPT

## 2022-07-16 PROCEDURE — 63710000001 INSULIN ASPART PER 5 UNITS: Performed by: HOSPITALIST

## 2022-07-16 RX ORDER — ENOXAPARIN SODIUM 100 MG/ML
30 INJECTION SUBCUTANEOUS EVERY 24 HOURS
Status: DISCONTINUED | OUTPATIENT
Start: 2022-07-16 | End: 2022-07-17 | Stop reason: HOSPADM

## 2022-07-16 RX ORDER — ENOXAPARIN SODIUM 100 MG/ML
40 INJECTION SUBCUTANEOUS DAILY
Status: DISCONTINUED | OUTPATIENT
Start: 2022-07-16 | End: 2022-07-16 | Stop reason: DRUGHIGH

## 2022-07-16 RX ADMIN — CYCLOSPORINE 100 MG: 100 CAPSULE, LIQUID FILLED ORAL at 20:38

## 2022-07-16 RX ADMIN — ENOXAPARIN SODIUM 30 MG: 30 INJECTION SUBCUTANEOUS at 17:47

## 2022-07-16 RX ADMIN — GABAPENTIN 200 MG: 100 CAPSULE ORAL at 20:38

## 2022-07-16 RX ADMIN — ROSUVASTATIN CALCIUM 20 MG: 20 TABLET, FILM COATED ORAL at 20:38

## 2022-07-16 RX ADMIN — CYCLOSPORINE 100 MG: 100 CAPSULE, LIQUID FILLED ORAL at 08:31

## 2022-07-16 RX ADMIN — GABAPENTIN 200 MG: 100 CAPSULE ORAL at 08:31

## 2022-07-16 RX ADMIN — INSULIN ASPART 5 UNITS: 100 INJECTION, SOLUTION INTRAVENOUS; SUBCUTANEOUS at 17:18

## 2022-07-16 RX ADMIN — AMLODIPINE BESYLATE 10 MG: 10 TABLET ORAL at 12:59

## 2022-07-16 RX ADMIN — ISOSORBIDE DINITRATE 20 MG: 20 TABLET ORAL at 10:37

## 2022-07-16 RX ADMIN — INSULIN ASPART 15 UNITS: 100 INJECTION, SOLUTION INTRAVENOUS; SUBCUTANEOUS at 17:19

## 2022-07-16 RX ADMIN — INSULIN ASPART 15 UNITS: 100 INJECTION, SOLUTION INTRAVENOUS; SUBCUTANEOUS at 13:00

## 2022-07-16 RX ADMIN — INSULIN DETEMIR 20 UNITS: 100 INJECTION, SOLUTION SUBCUTANEOUS at 20:42

## 2022-07-16 RX ADMIN — INSULIN ASPART 4 UNITS: 100 INJECTION, SOLUTION INTRAVENOUS; SUBCUTANEOUS at 13:00

## 2022-07-16 RX ADMIN — AZATHIOPRINE 100 MG: 50 TABLET ORAL at 08:31

## 2022-07-16 RX ADMIN — METOPROLOL SUCCINATE 50 MG: 50 TABLET, EXTENDED RELEASE ORAL at 08:31

## 2022-07-16 RX ADMIN — SODIUM CHLORIDE 75 ML/HR: 9 INJECTION, SOLUTION INTRAVENOUS at 03:53

## 2022-07-16 RX ADMIN — ISOSORBIDE DINITRATE 20 MG: 20 TABLET ORAL at 17:11

## 2022-07-16 RX ADMIN — GABAPENTIN 200 MG: 100 CAPSULE ORAL at 17:11

## 2022-07-16 RX ADMIN — ISOSORBIDE DINITRATE 20 MG: 20 TABLET ORAL at 12:59

## 2022-07-16 NOTE — PROGRESS NOTES
"[facility]  [address] [place] [address]. [address]  T - [phone]  F - [phone]     Progress Note          PATIENT  DEMOGRAPHICS   PATIENT NAME: Román Corey                      PHYSICIAN: Senthil ParedesADOLPH  : 1945  MRN: [id number]   LOS: 0 days    Patient Care Team:  Cam Cortez MD as PCP - General (Family Medicine)  Subjective   SUBJECTIVE   No acute events overnight, d/w RN         Objective   OBJECTIVE   Vital Signs  Temp:  [96.3 °F (35.7 °C)-98.8 °F (37.1 °C)] 98.6 °F (37 °C)  Heart Rate:  [71-94] 79  Resp:  [18-20] 18  BP: (110-178)/(58-84) 162/75    Flowsheet Rows    Flowsheet Row First Filed Value   Admission Height 170.2 cm (67\") Documented at 2022 1651   Admission Weight 85.3 kg (188 lb) Documented at 2022 1651           I/O last 3 completed shifts:  In: 1240 [P.O.:240; I.V.:1000]  Out: 4625 [Urine:4625]    PHYSICAL EXAM    Physical Exam  No exam r/t COVID precautions, d/w RN  RESULTS   Results Review:    Results from last 7 days   Lab Units 22  0547 07/15/22  0603 22  1730   SODIUM mmol/L 142 138 133*   POTASSIUM mmol/L 4.5 4.8 4.8   CHLORIDE mmol/L 110* 103 96*   CO2 mmol/L 23.0 21.0* 21.0*   BUN mg/dL 57* 61* 69*   CREATININE mg/dL 2.31* 2.97* 3.82*   CALCIUM mg/dL 7.9* 8.2* 7.9*   BILIRUBIN mg/dL  --  0.2 <0.2   ALK PHOS U/L  --  102 94   ALT (SGPT) U/L  --  17 17   AST (SGOT) U/L  --  27 26   GLUCOSE mg/dL 76 230* 282*       Estimated Creatinine Clearance: 27.1 mL/min (A) (by C-G formula based on SCr of 2.31 mg/dL (H)).    Results from last 7 days   Lab Units 22  0547 07/15/22  0922  1730   MAGNESIUM mg/dL 2.3 2.1 2.1   PHOSPHORUS mg/dL 4.0  --   --              Results from last 7 days   Lab Units 07/16/22  0547 07/15/22  0918 07/14/22  1730   WBC 10*3/mm3 4.90 5.10 5.67   HEMOGLOBIN g/dL 10.0* 11.1* 9.6*   PLATELETS 10*3/mm3 217 233 208               Imaging Results (Last 24 Hours)     Procedure Component Value " Units Date/Time    US Renal Bilateral [[id number]] Collected: 07/15/22 1807     Updated: 07/15/22 2116    Narrative:      EXAMINATION: Ultrasound of the kidneys and bladder    INDICATION: Acute kidney injury.    TECHNIQUE: Real time gray scale images of the kidneys and pre and  post void images of the bladder.     COMPARISON: None available.    FINDINGS:   The right kidney measures 10.3 cm in length. No hydronephrosis.  Perinephric hypoechoic signal measuring 0.8 cm in thickness.    The left kidney measures 9.2 cm in length. No hydronephrosis.  Perinephric hypoechoic signal measuring up to 1.4 cm thickness.    Urinary bladder demonstrates minimal to mild urinary bladder wall  thickening given degree of distention.      Impression:        Bilateral perinephric hypoechoic signal is suggestive of fluid  with nonspecific etiology, could reflect infectious process,  bilateral Page kidneys, or other etiology. Dedicated CT abdomen  and pelvis recommended for further characterization.    Minimal to mild urinary bladder wall thickening may reflect  cystitis or UTI.    Electronically signed by:  Senthil Colon MD  7/15/2022 9:14 PM  CDT Workstation: [id number]           MEDICATIONS    amLODIPine, 10 mg, Oral, Q24H  azaTHIOprine, 100 mg, Oral, Daily  cycloSPORINE modified, 100 mg, Oral, BID  gabapentin, 200 mg, Oral, TID  Insulin Aspart, 0-7 Units, Subcutaneous, TID AC  Insulin Aspart, 15 Units, Subcutaneous, TID With Meals  insulin detemir, 25 Units, Subcutaneous, Q12H  isosorbide dinitrate, 20 mg, Oral, TID - Nitrates  metoprolol succinate XL, 50 mg, Oral, Daily  rosuvastatin, 20 mg, Oral, Nightly  terazosin, 5 mg, Oral, Q12H      sodium chloride, 75 mL/hr, Last Rate: 75 mL/hr (07/16/22 1040)        Assessment & Plan   ASSESSMENT / PLAN      SHAQ (acute kidney injury) (HCC)    Chronic kidney disease    GERD (gastroesophageal reflux disease)    Hypertension    1. SHAQ on CKD stage 3b  Baseline cr 1.9-2.1 he came with a cr of  3.87  Cr down to from 3.8 to 2.3 with fluids   SHAQ likely from dehydration  Keep NS at 75 cc/hour  Cyclosporine level OK  US shows bilateral perinephric fluid, bladder wall thickening, could be infectious in nature. Check urine culture, may need CT abdomen/pelvis.     2. Heart transplant on immunosuppression  On cyclosporine 100 mg bid , imuran 100 mg qd , continue       3. HLD   On crestor        4. Covid pna      5. HTN   On metoprolol and isosorbide            This document has been electronically signed by ADOLPH Ambrosio on July 16, 2022 10:49 CDT

## 2022-07-16 NOTE — PLAN OF CARE
Goal Outcome Evaluation:         Pt on room air, resting between care, no acute changes since arrival to floor.

## 2022-07-16 NOTE — PROGRESS NOTES
[facility] Medicine Services  INPATIENT PROGRESS NOTE    Length of Stay: 0  Date of Admission: 7/14/2022  Primary Care Physician: Cam Cortez MD    Subjective   Chief Complaint: Cough, sore throat and fatigue    HPI:      7/15/22: Afebrile.  Nephrology consulted.  Received 2 L IV bolus yesterday.  7/16/22: Renal function improving.  Fatigue improving.    Review of Systems:  As per history of present illness and a complete 12 point review of systems is otherwise negative.     Objective    Temp:  [96 °F (35.6 °C)-98.8 °F (37.1 °C)] 96.1 °F (35.6 °C)  Heart Rate:  [71-81] 77  Resp:  [18-20] 18  BP: (114-175)/(62-80) 157/80    Physical Exam:  Gen.: Appears as stated age.  No acute distress.  HEENT: EOMI.  PERRLA.  Sclerae anicteric.  Moist mucous membranes.  Neck: Supple.  No JVD.  No thyromegaly.  Chest: Clear to auscultation bilaterally.  No wheeze, rhonchi or rales.  Heart: Regular rhythm and rate.  No murmurs, rubs or gallops.  Abdomen: Normoactive bowel sounds.  Nontender.  Nondistended.  No guarding.   Neurological: Cranial nerves II through XII are grossly intact.  No cerebellar signs.   Extremities: Good range of motion throughout.  No cyanosis, clubbing or edema.  Skin: Warm.  No rashes.  No ulcers.  Psychiatry: Cooperative.    Results Review:  I have reviewed the labs, radiology results, and diagnostic studies.    Laboratory Data:   Results from last 7 days   Lab Units 07/16/22  0547 07/15/22  0603 07/14/22  1730   SODIUM mmol/L 142 138 133*   POTASSIUM mmol/L 4.5 4.8 4.8   CHLORIDE mmol/L 110* 103 96*   CO2 mmol/L 23.0 21.0* 21.0*   BUN mg/dL 57* 61* 69*   CREATININE mg/dL 2.31* 2.97* 3.82*   GLUCOSE mg/dL 76 230* 282*   CALCIUM mg/dL 7.9* 8.2* 7.9*   BILIRUBIN mg/dL  --  0.2 <0.2   ALK PHOS U/L  --  102 94   ALT (SGPT) U/L  --  17 17   AST (SGOT) U/L  --  27 26   ANION GAP mmol/L 9.0 14.0 16.0*     Estimated Creatinine Clearance: 27.1 mL/min (A) (by C-G formula  based on SCr of 2.31 mg/dL (H)).  Results from last 7 days   Lab Units 07/16/22  0547 07/15/22  0918 07/14/22  1730   MAGNESIUM mg/dL 2.3 2.1 2.1   PHOSPHORUS mg/dL 4.0  --   --          Results from last 7 days   Lab Units 07/16/22  0547 07/15/22  0918 07/14/22  1730   WBC 10*3/mm3 4.90 5.10 5.67   HEMOGLOBIN g/dL 10.0* 11.1* 9.6*   HEMATOCRIT % 29.0* 33.0* 27.7*   PLATELETS 10*3/mm3 217 233 208           Culture Data:   No results found for: BLOODCX  No results found for: URINECX  No results found for: RESPCX  No results found for: WOUNDCX  No results found for: STOOLCX  No components found for: BODYFLD    Radiology Data:   Imaging Results (Last 24 Hours)     Procedure Component Value Units Date/Time    US Renal Bilateral [[id number]] Collected: 07/15/22 1807     Updated: 07/15/22 2116    Narrative:      EXAMINATION: Ultrasound of the kidneys and bladder    INDICATION: Acute kidney injury.    TECHNIQUE: Real time gray scale images of the kidneys and pre and  post void images of the bladder.     COMPARISON: None available.    FINDINGS:   The right kidney measures 10.3 cm in length. No hydronephrosis.  Perinephric hypoechoic signal measuring 0.8 cm in thickness.    The left kidney measures 9.2 cm in length. No hydronephrosis.  Perinephric hypoechoic signal measuring up to 1.4 cm thickness.    Urinary bladder demonstrates minimal to mild urinary bladder wall  thickening given degree of distention.      Impression:        Bilateral perinephric hypoechoic signal is suggestive of fluid  with nonspecific etiology, could reflect infectious process,  bilateral Page kidneys, or other etiology. Dedicated CT abdomen  and pelvis recommended for further characterization.    Minimal to mild urinary bladder wall thickening may reflect  cystitis or UTI.    Electronically signed by:  Senthil Colon MD  7/15/2022 9:14 PM  CDT Workstation: [id number]          Scheduled Meds:amLODIPine, 10 mg, Oral, Q24H  azaTHIOprine, 100 mg, Oral,  Daily  cycloSPORINE modified, 100 mg, Oral, BID  gabapentin, 200 mg, Oral, TID  Insulin Aspart, 0-7 Units, Subcutaneous, TID AC  Insulin Aspart, 15 Units, Subcutaneous, TID With Meals  insulin detemir, 25 Units, Subcutaneous, Q12H  isosorbide dinitrate, 20 mg, Oral, TID - Nitrates  metoprolol succinate XL, 50 mg, Oral, Daily  rosuvastatin, 20 mg, Oral, Nightly  terazosin, 5 mg, Oral, Q12H      Continuous Infusions:sodium chloride, 75 mL/hr, Last Rate: 75 mL/hr (07/16/22 1604)      PRN Meds:.dextrose  •  dextrose  •  docusate sodium  •  glucagon (human recombinant)  •  senna  •  sodium chloride    Assessment/Plan     Principal Problem:    SHAQ (acute kidney injury) ([facility])  Active Problems:    Chronic kidney disease    GERD (gastroesophageal reflux disease)    Hypertension    COVID-19 complicated with prerenal SHAQ on CKD stage III:  -s/p IV fluid bolus in the ED  -Improving with IV fluid resuscitation  -Consulted nephrology  -Nephrotoxin hold  -Follows with nephrologist in [place]     Heart transplant:  -Continue home Imuran and cyclosporine  -Cyclosporine level okay  -Last TTE LVEF 61 to 65%.     IDDM with peripheral neuropathy:  -Continue Levemir and aspart at a lower dose  -Continue ISS and Neurontin     Hyperlipidemia:  -Continue statin     Hypertension:  -Continue amlodipine      Discharge Planning: I expect patient to be discharged to 1 in 2 days.    López Ramirez DO  07/16/22  17:01 CDT

## 2022-07-17 ENCOUNTER — READMISSION MANAGEMENT (OUTPATIENT)
Dept: CALL CENTER | Facility: HOSPITAL | Age: 77
End: 2022-07-17

## 2022-07-17 VITALS
SYSTOLIC BLOOD PRESSURE: 138 MMHG | HEART RATE: 75 BPM | RESPIRATION RATE: 18 BRPM | HEIGHT: 67 IN | OXYGEN SATURATION: 98 % | DIASTOLIC BLOOD PRESSURE: 86 MMHG | WEIGHT: 190 LBS | BODY MASS INDEX: 29.82 KG/M2 | TEMPERATURE: 96.9 F

## 2022-07-17 PROBLEM — D89.831 CYTOKINE RELEASE SYNDROME, GRADE 1: Status: ACTIVE | Noted: 2022-07-17

## 2022-07-17 LAB
ANION GAP SERPL CALCULATED.3IONS-SCNC: 8 MMOL/L (ref 5–15)
BACTERIA SPEC AEROBE CULT: NO GROWTH
BASOPHILS # BLD AUTO: 0.01 10*3/MM3 (ref 0–0.2)
BASOPHILS NFR BLD AUTO: 0.2 % (ref 0–1.5)
BUN SERPL-MCNC: 39 MG/DL (ref 8–23)
BUN/CREAT SERPL: 21.2 (ref 7–25)
CALCIUM SPEC-SCNC: 7.8 MG/DL (ref 8.6–10.5)
CHLORIDE SERPL-SCNC: 111 MMOL/L (ref 98–107)
CO2 SERPL-SCNC: 23 MMOL/L (ref 22–29)
CREAT SERPL-MCNC: 1.84 MG/DL (ref 0.76–1.27)
DEPRECATED RDW RBC AUTO: 39.4 FL (ref 37–54)
EGFRCR SERPLBLD CKD-EPI 2021: 37.1 ML/MIN/1.73
EOSINOPHIL # BLD AUTO: 0.17 10*3/MM3 (ref 0–0.4)
EOSINOPHIL NFR BLD AUTO: 3.8 % (ref 0.3–6.2)
ERYTHROCYTE [DISTWIDTH] IN BLOOD BY AUTOMATED COUNT: 13.4 % (ref 12.3–15.4)
GLUCOSE BLDC GLUCOMTR-MCNC: 238 MG/DL (ref 70–130)
GLUCOSE BLDC GLUCOMTR-MCNC: 97 MG/DL (ref 70–130)
GLUCOSE SERPL-MCNC: 73 MG/DL (ref 65–99)
HCT VFR BLD AUTO: 30.8 % (ref 37.5–51)
HGB BLD-MCNC: 10.1 G/DL (ref 13–17.7)
IMM GRANULOCYTES # BLD AUTO: 0.01 10*3/MM3 (ref 0–0.05)
IMM GRANULOCYTES NFR BLD AUTO: 0.2 % (ref 0–0.5)
LYMPHOCYTES # BLD AUTO: 2.03 10*3/MM3 (ref 0.7–3.1)
LYMPHOCYTES NFR BLD AUTO: 45.8 % (ref 19.6–45.3)
MAGNESIUM SERPL-MCNC: 2.1 MG/DL (ref 1.6–2.4)
MCH RBC QN AUTO: 26.4 PG (ref 26.6–33)
MCHC RBC AUTO-ENTMCNC: 32.8 G/DL (ref 31.5–35.7)
MCV RBC AUTO: 80.4 FL (ref 79–97)
MONOCYTES # BLD AUTO: 0.38 10*3/MM3 (ref 0.1–0.9)
MONOCYTES NFR BLD AUTO: 8.6 % (ref 5–12)
NEUTROPHILS NFR BLD AUTO: 1.83 10*3/MM3 (ref 1.7–7)
NEUTROPHILS NFR BLD AUTO: 41.4 % (ref 42.7–76)
NRBC BLD AUTO-RTO: 0 /100 WBC (ref 0–0.2)
PLATELET # BLD AUTO: 232 10*3/MM3 (ref 140–450)
PMV BLD AUTO: 10.1 FL (ref 6–12)
POTASSIUM SERPL-SCNC: 5 MMOL/L (ref 3.5–5.2)
RBC # BLD AUTO: 3.83 10*6/MM3 (ref 4.14–5.8)
SODIUM SERPL-SCNC: 142 MMOL/L (ref 136–145)
WBC NRBC COR # BLD: 4.43 10*3/MM3 (ref 3.4–10.8)

## 2022-07-17 PROCEDURE — 63710000001 INSULIN DETEMIR PER 5 UNITS: Performed by: HOSPITALIST

## 2022-07-17 PROCEDURE — G0378 HOSPITAL OBSERVATION PER HR: HCPCS

## 2022-07-17 PROCEDURE — 82962 GLUCOSE BLOOD TEST: CPT

## 2022-07-17 PROCEDURE — 83735 ASSAY OF MAGNESIUM: CPT | Performed by: HOSPITALIST

## 2022-07-17 PROCEDURE — 85025 COMPLETE CBC W/AUTO DIFF WBC: CPT | Performed by: HOSPITALIST

## 2022-07-17 PROCEDURE — 63710000001 AZATHIOPRINE PER 50 MG: Performed by: HOSPITALIST

## 2022-07-17 PROCEDURE — 63710000001 CYCLOSPORINE MODIFIED PER 100 MG: Performed by: HOSPITALIST

## 2022-07-17 PROCEDURE — 80048 BASIC METABOLIC PNL TOTAL CA: CPT | Performed by: HOSPITALIST

## 2022-07-17 PROCEDURE — 63710000001 INSULIN ASPART PER 5 UNITS: Performed by: HOSPITALIST

## 2022-07-17 RX ADMIN — INSULIN ASPART 3 UNITS: 100 INJECTION, SOLUTION INTRAVENOUS; SUBCUTANEOUS at 12:02

## 2022-07-17 RX ADMIN — CYCLOSPORINE 100 MG: 100 CAPSULE, LIQUID FILLED ORAL at 09:43

## 2022-07-17 RX ADMIN — ISOSORBIDE DINITRATE 20 MG: 20 TABLET ORAL at 09:43

## 2022-07-17 RX ADMIN — AZATHIOPRINE 100 MG: 50 TABLET ORAL at 08:39

## 2022-07-17 RX ADMIN — AMLODIPINE BESYLATE 10 MG: 10 TABLET ORAL at 08:39

## 2022-07-17 RX ADMIN — INSULIN DETEMIR 20 UNITS: 100 INJECTION, SOLUTION SUBCUTANEOUS at 08:40

## 2022-07-17 RX ADMIN — GABAPENTIN 200 MG: 100 CAPSULE ORAL at 08:39

## 2022-07-17 RX ADMIN — METOPROLOL SUCCINATE 50 MG: 50 TABLET, EXTENDED RELEASE ORAL at 08:39

## 2022-07-17 RX ADMIN — INSULIN ASPART 15 UNITS: 100 INJECTION, SOLUTION INTRAVENOUS; SUBCUTANEOUS at 12:01

## 2022-07-17 NOTE — PLAN OF CARE
Goal Outcome Evaluation:      Afebrile, no acute changes this shift, resting between care.

## 2022-07-17 NOTE — OUTREACH NOTE
Prep Survey    Flowsheet Row Responses   [facility] facility patient discharged from? [place]   Is LACE score < 7 ? No   Emergency Room discharge w/ pulse ox? No   Eligibility Readm Mgmt   Discharge diagnosis COVID + / SHAQ    Does the patient have one of the following disease processes/diagnoses(primary or secondary)? COVID-19   Does the patient have Home health ordered? No   Is there a DME ordered? No   Prep survey completed? Yes          GRIFFIN LEVINE - Registered Nurse         Regular rate & rhythm, normal S1, S2; no murmurs, gallops or rubs; no S3, S4

## 2022-07-17 NOTE — DISCHARGE SUMMARY
[facility] Medicine Services  DISCHARGE SUMMARY       Date of Admission: 7/14/2022  Date of Discharge:  7/17/2022  Primary Care Physician: Cam Cortez MD    Presenting Problem/History of Present Illness:  Orthostatic hypotension [I95.1]  Dehydration [E86.0]  SHAQ (acute kidney injury) (HCC) [N17.9]  COVID-19 [U07.1]       Final Discharge Diagnoses:  Active Hospital Problems    Diagnosis    • **SHAQ (acute kidney injury) (HCC)    • Chronic kidney disease    • GERD (gastroesophageal reflux disease)    • Hypertension    • Cytokine release syndrome, grade 1        Consults:   Consults     Date and Time Order Name Status Description    7/15/2022  1:08 AM Inpatient Nephrology Consult Completed           Procedures Performed:                 Pertinent Test Results:   Imaging Results (Last 7 Days)     Procedure Component Value Units Date/Time    US Renal Bilateral [[id number]] Collected: 07/15/22 1807     Updated: 07/15/22 2116    Narrative:      EXAMINATION: Ultrasound of the kidneys and bladder    INDICATION: Acute kidney injury.    TECHNIQUE: Real time gray scale images of the kidneys and pre and  post void images of the bladder.     COMPARISON: None available.    FINDINGS:   The right kidney measures 10.3 cm in length. No hydronephrosis.  Perinephric hypoechoic signal measuring 0.8 cm in thickness.    The left kidney measures 9.2 cm in length. No hydronephrosis.  Perinephric hypoechoic signal measuring up to 1.4 cm thickness.    Urinary bladder demonstrates minimal to mild urinary bladder wall  thickening given degree of distention.      Impression:        Bilateral perinephric hypoechoic signal is suggestive of fluid  with nonspecific etiology, could reflect infectious process,  bilateral Page kidneys, or other etiology. Dedicated CT abdomen  and pelvis recommended for further characterization.    Minimal to mild urinary bladder wall thickening may reflect  cystitis or  UTI.    Electronically signed by:  Senthil Colon MD  7/15/2022 9:14 PM  CDT Workstation: [id number]        Lab Results (last 24 hours)     Procedure Component Value Units Date/Time    Urine Culture - Urine, Urine, Clean Catch [[id number]]  (Normal) Collected: 07/15/22 2106    Specimen: Urine, Clean Catch Updated: 07/17/22 1213     Urine Culture No growth    POC Glucose Once [[id number]]  (Abnormal) Collected: 07/17/22 1120    Specimen: Blood Updated: 07/17/22 1201     Glucose 238 mg/dL      Comment: RN NotifiedOperator: [id number] Addictive TAYLORMeter ID: [id number]       POC Glucose Once [[id number]]  (Normal) Collected: 07/17/22 0739    Specimen: Blood Updated: 07/17/22 0819     Glucose 97 mg/dL      Comment: RN NotifiedOperator: [id number] Addictive TAYLORMeter ID: [id number]       Basic Metabolic Panel [[id number]]  (Abnormal) Collected: 07/17/22 0607    Specimen: Blood Updated: 07/17/22 0736     Glucose 73 mg/dL      BUN 39 mg/dL      Creatinine 1.84 mg/dL      Sodium 142 mmol/L      Potassium 5.0 mmol/L      Chloride 111 mmol/L      CO2 23.0 mmol/L      Calcium 7.8 mg/dL      BUN/Creatinine Ratio 21.2     Anion Gap 8.0 mmol/L      eGFR 37.1 mL/min/1.73      Comment: National Kidney Foundation and American Society of Nephrology (ASN) Task Force recommended calculation based on the Chronic Kidney Disease Epidemiology Collaboration (CKD-EPI) equation refit without adjustment for race.       Narrative:      GFR Normal >60  Chronic Kidney Disease <60  Kidney Failure <15      Magnesium [[id number]]  (Normal) Collected: 07/17/22 0607    Specimen: Blood Updated: 07/17/22 0736     Magnesium 2.1 mg/dL     CBC & Differential [[id number]]  (Abnormal) Collected: 07/17/22 0607    Specimen: Blood Updated: 07/17/22 0708    Narrative:      The following orders were created for panel order CBC & Differential.  Procedure                               Abnormality         Status                     ---------                                -----------         ------                     CBC Auto Differential[[id number]]        Abnormal            Final result                 Please view results for these tests on the individual orders.    CBC Auto Differential [[id number]]  (Abnormal) Collected: 07/17/22 0607    Specimen: Blood Updated: 07/17/22 0708     WBC 4.43 10*3/mm3      RBC 3.83 10*6/mm3      Hemoglobin 10.1 g/dL      Hematocrit 30.8 %      MCV 80.4 fL      MCH 26.4 pg      MCHC 32.8 g/dL      RDW 13.4 %      RDW-SD 39.4 fl      MPV 10.1 fL      Platelets 232 10*3/mm3      Neutrophil % 41.4 %      Lymphocyte % 45.8 %      Monocyte % 8.6 %      Eosinophil % 3.8 %      Basophil % 0.2 %      Immature Grans % 0.2 %      Neutrophils, Absolute 1.83 10*3/mm3      Lymphocytes, Absolute 2.03 10*3/mm3      Monocytes, Absolute 0.38 10*3/mm3      Eosinophils, Absolute 0.17 10*3/mm3      Basophils, Absolute 0.01 10*3/mm3      Immature Grans, Absolute 0.01 10*3/mm3      nRBC 0.0 /100 WBC     POC Glucose Once [[id number]]  (Abnormal) Collected: 07/16/22 1947    Specimen: Blood Updated: 07/16/22 2046     Glucose 216 mg/dL      Comment: RN NotifiedOperator: [id number] LUCINDA Jones ID: [id number]       POC Glucose Once [[id number]]  (Abnormal) Collected: 07/16/22 1718    Specimen: Blood Updated: 07/16/22 1811     Glucose 305 mg/dL      Comment: RN NotifiedOperator: [id number] REMI Johnson ID: [id number]               Chief Complaint on Day of Discharge: none      Hospital Course:    The patient is a pleasant 77-year-old vaccinated/boosted male with history of heart transplant on cyclosporine and Imuran, IDDM, CVA, CKD stage III, hypertension and AGUILAR    The patient was recently experiencing sore throat, yellow productive cough and fatigue and tested positive with home COVID-19 test thus came to the ED on 7/14/2022.    ED work-up was significant for BUN 69, creatinine 3.82 which is above his baseline.  The patient was not requiring supplemental  "oxygen on admission    The patient was admitted to the medical floor.  He received IV fluid resuscitation for suspected prerenal SHAQ.  Renal function improved with IV fluids.  He has been hemodynamically stable during the hospitalization and did not require supplemental oxygen.  He was discharged home on 7/17/2022 and will need to follow-up with his PCP in 2 weeks.       Condition on Discharge: Hemodynamically stable    Physical Exam on Discharge:  /86 (BP Location: Right arm, Patient Position: Sitting)   Pulse 75   Temp 96.9 °F (36.1 °C) (Oral)   Resp 18   Ht 170.2 cm (67\")   Wt 86.2 kg (190 lb)   SpO2 98%   BMI 29.76 kg/m²     Gen.: Appears as stated age.  No acute distress.  HEENT: EOMI.  PERRLA.  Sclerae anicteric.  Moist mucous membranes.  Neck: Supple.  No JVD.  No thyromegaly.  Chest: Clear to auscultation bilaterally.  No wheeze, rhonchi or rales.  Heart: Regular rhythm and rate.  No murmurs, rubs or gallops.  Abdomen: Normoactive bowel sounds.  Nontender.  Nondistended.  No guarding.   Neurological: Cranial nerves II through XII are grossly intact.  No cerebellar signs.   Extremities: Good range of motion throughout.  No cyanosis, clubbing or edema.  Skin: Warm.  No rashes.  No ulcers.  Psychiatry: Cooperative.        Discharge Disposition:  Home or Self Care    Discharge Medications:     Discharge Medications      Continue These Medications      Instructions Start Date   amLODIPine 10 MG tablet  Commonly known as: NORVASC   5 mg, Oral, Daily, Dose provided from recent admission to [facility]      azaTHIOprine 50 MG tablet  Commonly known as: IMURAN   100 mg, Oral, Daily      calcitriol 0.25 MCG capsule  Commonly known as: ROCALTROL   0.25 mcg, Oral, Every Other Day      calcium-vitamin D 500-200 MG-UNIT per tablet  Commonly known as: OSCAL-500   1 tablet, Oral, Daily      cycloSPORINE modified 100 MG capsule  Commonly known as: NEORAL   100 mg, Oral, 2 Times Daily      docusate sodium 100 MG " capsule  Commonly known as: COLACE   100 mg, Oral, As Needed      doxazosin 8 MG tablet  Commonly known as: CARDURA   8 mg, 2 Times Daily      ezetimibe 10 MG tablet  Commonly known as: ZETIA   10 mg, Oral, Nightly      fish oil 1000 MG capsule capsule   1 capsule, Oral, 2 Times Daily With Meals      furosemide 40 MG tablet  Commonly known as: LASIX   20 mg, Oral, Daily      gabapentin 100 MG capsule  Commonly known as: NEURONTIN   200 mg, Oral, 3 Times Daily      insulin aspart 100 UNIT/ML injection  Commonly known as: novoLOG   30 Units, Subcutaneous, 3 Times Daily      insulin glargine 100 UNIT/ML injection  Commonly known as: LANTUS, SEMGLEE   30 Units, Subcutaneous, 2 Times Daily      isosorbide dinitrate 20 MG tablet  Commonly known as: ISORDIL   20 mg, Oral, 3 Times Daily, 8:00 am, 1:00 pm' and 6:00 pm      metoprolol succinate XL 50 MG 24 hr tablet  Commonly known as: TOPROL-XL   50 mg, Oral, Daily      multivitamin with minerals tablet tablet   1 tablet, Oral, Daily      POTASSIUM CHLORIDE PO   60 mEq, Oral, Nightly, Per last admission  to [facility] in april      rosuvastatin 20 MG tablet  Commonly known as: CRESTOR   20 mg, Oral, Nightly      Sennosides 15 MG chewable tablet   2 tablets, Oral, 2 Times Daily PRN             Discharge Diet: Diabetic    Activity at Discharge: as toleraterd    Discharge Care Plan/Instructions:      1. Follow up with PCP in 2 weeks    Follow-up Appointments:   Future Appointments   Date Time Provider Department Center   8/12/2022 11:15 AM Reginaldo Wood APRN [facility] None   2/3/2023 11:15 AM Reginaldo Wood APRN [facility] None       Test Results Pending at Discharge:   Pending Labs     Order Current Status    Cyclosporine Level In process          López Ramirez DO  07/17/22  12:51 CDT

## 2022-07-17 NOTE — PROGRESS NOTES
"[facility]  [address] [place] [address]. [address]  T - [phone]  F - [phone]     Progress Note          PATIENT  DEMOGRAPHICS   PATIENT NAME: Román Corey                      PHYSICIAN: Senthil SWANN ADOLPH Paredes  : 1945  MRN: [id number]   LOS: 0 days    Patient Care Team:  Cam Cortez MD as PCP - General (Family Medicine)  Subjective   SUBJECTIVE   No acute events overnight, d/w RN         Objective   OBJECTIVE   Vital Signs  Temp:  [96 °F (35.6 °C)-96.3 °F (35.7 °C)] 96.3 °F (35.7 °C)  Heart Rate:  [74-82] 82  Resp:  [18] 18  BP: (124-175)/(62-80) 127/68    Flowsheet Rows    Flowsheet Row First Filed Value   Admission Height 170.2 cm (67\") Documented at 2022 1651   Admission Weight 85.3 kg (188 lb) Documented at 2022 1651           I/O last 3 completed shifts:  In: 1480 [P.O.:480; I.V.:1000]  Out: 4225 [Urine:4225]    PHYSICAL EXAM    Physical Exam  No exam r/t COVID precautions, d/w RN  RESULTS   Results Review:    Results from last 7 days   Lab Units 22  0607 22  0547 07/15/22  0603 22  1730   SODIUM mmol/L 142 142 138 133*   POTASSIUM mmol/L 5.0 4.5 4.8 4.8   CHLORIDE mmol/L 111* 110* 103 96*   CO2 mmol/L 23.0 23.0 21.0* 21.0*   BUN mg/dL 39* 57* 61* 69*   CREATININE mg/dL 1.84* 2.31* 2.97* 3.82*   CALCIUM mg/dL 7.8* 7.9* 8.2* 7.9*   BILIRUBIN mg/dL  --   --  0.2 <0.2   ALK PHOS U/L  --   --  102 94   ALT (SGPT) U/L  --   --  17 17   AST (SGOT) U/L  --   --  27 26   GLUCOSE mg/dL 73 76 230* 282*       Estimated Creatinine Clearance: 34.7 mL/min (A) (by C-G formula based on SCr of 1.84 mg/dL (H)).    Results from last 7 days   Lab Units 22  0622  0547 07/15/22  0918   MAGNESIUM mg/dL 2.1 2.3 2.1   PHOSPHORUS mg/dL  --  4.0  --              Results from last 7 days   Lab Units 22  0622  0547 07/15/22  0918 22  1730   WBC 10*3/mm3 4.43 4.90 5.10 5.67   HEMOGLOBIN g/dL 10.1* 10.0* 11.1* 9.6*   PLATELETS " 10*3/mm3 232 217 233 208               Imaging Results (Last 24 Hours)     ** No results found for the last 24 hours. **           MEDICATIONS    amLODIPine, 10 mg, Oral, Q24H  azaTHIOprine, 100 mg, Oral, Daily  cycloSPORINE modified, 100 mg, Oral, BID  enoxaparin, 30 mg, Subcutaneous, Q24H  gabapentin, 200 mg, Oral, TID  Insulin Aspart, 0-7 Units, Subcutaneous, TID AC  Insulin Aspart, 15 Units, Subcutaneous, TID With Meals  insulin detemir, 20 Units, Subcutaneous, Q12H  isosorbide dinitrate, 20 mg, Oral, TID - Nitrates  metoprolol succinate XL, 50 mg, Oral, Daily  rosuvastatin, 20 mg, Oral, Nightly  terazosin, 5 mg, Oral, Q12H      sodium chloride, 75 mL/hr, Last Rate: 75 mL/hr (07/16/22 1604)        Assessment & Plan   ASSESSMENT / PLAN      SHAQ (acute kidney injury) (HCC)    Chronic kidney disease    GERD (gastroesophageal reflux disease)    Hypertension    1. SHAQ on CKD stage 3b:  -Baseline cr 1.9-2.1 he came with a cr of 3.87  -Cr down to from 3.8 to 1.8 with fluids   -SHAQ likely from dehydration  -Stop IVF  -Cyclosporine level OK  -US shows bilateral perinephric fluid, bladder wall thickening, could be infectious in nature. Check urine culture, may need CT abdomen/pelvis.     2. Heart transplant on immunosuppression:  -On cyclosporine 100 mg bid , imuran 100 mg qd , continue       3. HLD:  -On crestor     4. Covid PNA     5. HTN:  -On metoprolol and isosorbide            This document has been electronically signed by ADOLPH Ambrosio on July 17, 2022 09:50 CDT

## 2022-07-18 ENCOUNTER — READMISSION MANAGEMENT (OUTPATIENT)
Dept: CALL CENTER | Facility: HOSPITAL | Age: 77
End: 2022-07-18

## 2022-07-18 LAB — CYCLOSPORINE BLD LC/MS/MS-MCNC: 20 NG/ML (ref 100–400)

## 2022-07-18 NOTE — OUTREACH NOTE
"COVID-19 Week 1 Survey    Flowsheet Row Responses   [facility] patient discharged from? [facility]   Does the patient have one of the following disease processes/diagnoses(primary or secondary)? COVID-19   COVID-19 underlying condition? None   Call Number Call 1   Week 1 Call successful? Yes   Call start time 1309   Call end time 1311   Is patient permission given to speak with other caregiver? Yes   Person spoke with today (if not patient) and relationship Luis Angel-son.   COVID-19 call completed? Yes   Wrap up additional comments Son, Luis Angel, states talked with patient yesterday, and he was \"doing okay\". States will be calling patient today-will advise patient to call if he has any questions/concerns.          SUDEEP REYNOSO - Registered Nurse  "

## 2022-07-20 ENCOUNTER — READMISSION MANAGEMENT (OUTPATIENT)
Dept: CALL CENTER | Facility: HOSPITAL | Age: 77
End: 2022-07-20

## 2022-07-20 NOTE — OUTREACH NOTE
COVID-19 Week 1 Survey    Flowsheet Row Responses   [facility] patient discharged from? [place]   Does the patient have one of the following disease processes/diagnoses(primary or secondary)? COVID-19   COVID-19 underlying condition? None   Call Number Call 2   Week 1 Call successful? Yes   Call start time 1154   Call end time 1200   Discharge diagnosis COVID + / SHAQ    Meds reviewed with patient/caregiver? Yes   Is the patient having any side effects they believe may be caused by any medication additions or changes? No   Does the patient have all medications ordered at discharge? Yes   Is the patient taking all medications as directed (includes completed medication regime)? Yes   Does the patient have a primary care provider?  Yes   Psychosocial issues? No   Did the patient receive a copy of their discharge instructions? Yes   Did the patient receive a copy of COVID-19 specific instructions? Yes   Nursing interventions Reviewed instructions with patient   What is the patient's perception of their health status since discharge? Improving   Does the patient have any of the following symptoms? Cough, Shortness of breath  [congestion]   Nursing Interventions Nurse provided patient education   Pulse Ox monitoring Intermittent   Pulse Ox device source Hospital   O2 Sat comments He has not checks sats yet today but will do so. He is monitoring temp, HR, B/P and sats   O2 Sat: education provided Sat levels, Monitoring frequency, When to seek care   Is the patient/caregiver able to teach back steps to recovery at home? Set small, achievable goals for return to baseline health, Rest and rebuild strength, gradually increase activity, Make a list of questions for provider's appointment   If the patient is a current smoker, are they able to teach back resources for cessation? Not a smoker   Is the patient/caregiver able to teach back the hierarchy of who to call/visit for symptoms/problems? PCP, Specialist, Home  health nurse, Urgent Care, ED, 911 Yes   COVID-19 call completed? Yes   Wrap up additional comments Wife has covid too. He reports they are doing ok. Grandson helps them out and gets what they need.           CASA LEVINE - Registered Nurse

## 2022-11-14 ENCOUNTER — APPOINTMENT (OUTPATIENT)
Dept: GENERAL RADIOLOGY | Facility: HOSPITAL | Age: 77
End: 2022-11-14

## 2022-11-14 ENCOUNTER — HOSPITAL ENCOUNTER (EMERGENCY)
Facility: HOSPITAL | Age: 77
Discharge: HOME | End: 2022-11-14
Attending: FAMILY MEDICINE | Admitting: FAMILY MEDICINE

## 2022-11-14 ENCOUNTER — APPOINTMENT (OUTPATIENT)
Dept: CT IMAGING | Facility: HOSPITAL | Age: 77
End: 2022-11-14

## 2022-11-14 VITALS
RESPIRATION RATE: 20 BRPM | DIASTOLIC BLOOD PRESSURE: 98 MMHG | SYSTOLIC BLOOD PRESSURE: 163 MMHG | BODY MASS INDEX: 29.51 KG/M2 | HEIGHT: 67 IN | OXYGEN SATURATION: 98 % | TEMPERATURE: 97.7 F | WEIGHT: 188 LBS | HEART RATE: 82 BPM

## 2022-11-14 DIAGNOSIS — M25.461 EFFUSION OF RIGHT KNEE: Primary | ICD-10-CM

## 2022-11-14 DIAGNOSIS — M25.512 ACUTE PAIN OF LEFT SHOULDER: ICD-10-CM

## 2022-11-14 PROCEDURE — 25010000002 ORPHENADRINE CITRATE PER 60 MG

## 2022-11-14 PROCEDURE — 99283 EMERGENCY DEPT VISIT LOW MDM: CPT

## 2022-11-14 PROCEDURE — 73060 X-RAY EXAM OF HUMERUS: CPT

## 2022-11-14 PROCEDURE — 73564 X-RAY EXAM KNEE 4 OR MORE: CPT

## 2022-11-14 PROCEDURE — 99284 EMERGENCY DEPT VISIT MOD MDM: CPT

## 2022-11-14 PROCEDURE — 73030 X-RAY EXAM OF SHOULDER: CPT

## 2022-11-14 PROCEDURE — 25010000002 KETOROLAC TROMETHAMINE PER 15 MG

## 2022-11-14 PROCEDURE — 72040 X-RAY EXAM NECK SPINE 2-3 VW: CPT

## 2022-11-14 PROCEDURE — 96372 THER/PROPH/DIAG INJ SC/IM: CPT

## 2022-11-14 PROCEDURE — 73700 CT LOWER EXTREMITY W/O DYE: CPT

## 2022-11-14 RX ORDER — CLONIDINE HYDROCHLORIDE 0.1 MG/1
0.1 TABLET ORAL ONCE
Status: DISCONTINUED | OUTPATIENT
Start: 2022-11-14 | End: 2022-11-14 | Stop reason: HOSPADM

## 2022-11-14 RX ORDER — DICLOFENAC SODIUM 75 MG/1
75 TABLET, DELAYED RELEASE ORAL 2 TIMES DAILY
Qty: 14 TABLET | Refills: 0 | Status: SHIPPED | OUTPATIENT
Start: 2022-11-14 | End: 2022-11-21

## 2022-11-14 RX ORDER — ORPHENADRINE CITRATE 30 MG/ML
60 INJECTION INTRAMUSCULAR; INTRAVENOUS ONCE
Status: COMPLETED | OUTPATIENT
Start: 2022-11-14 | End: 2022-11-14

## 2022-11-14 RX ORDER — KETOROLAC TROMETHAMINE 30 MG/ML
30 INJECTION, SOLUTION INTRAMUSCULAR; INTRAVENOUS ONCE
Status: COMPLETED | OUTPATIENT
Start: 2022-11-14 | End: 2022-11-14

## 2022-11-14 RX ADMIN — KETOROLAC TROMETHAMINE 30 MG: 30 INJECTION, SOLUTION INTRAMUSCULAR; INTRAVENOUS at 13:13

## 2022-11-14 RX ADMIN — ORPHENADRINE CITRATE 60 MG: 30 INJECTION INTRAMUSCULAR; INTRAVENOUS at 13:13

## 2022-11-14 NOTE — ED PROVIDER NOTES
Subjective   History of Present Illness  77 year old male patient presents to ER with family for complaint of right knee pain since falling on Friday. He was seated on a bucket and caught his foot trying to stand and fell onto right knee. He also had a fall 2-3 weeks ago where he fall onto the left side and complains of left shoulder, left upper arm, and left sided neck pain. He was evaluated at  but wants repeat XRs due to pain. He presents wearing an arm sling. He is rating his pain 7-8/10, worst in left shoulder presently. He has been using ice to right knee with some relief. He has tried tylenol for shoulder without relief. He denies tobacco, ETOH, or illcit drug use.         Review of Systems   Constitutional: Negative.    HENT: Negative.    Eyes: Negative.    Respiratory: Negative.    Cardiovascular: Negative.    Gastrointestinal: Negative.    Endocrine: Negative.    Genitourinary: Negative.    Musculoskeletal: Positive for arthralgias, joint swelling and neck pain.   Skin: Negative.    Allergic/Immunologic: Negative.    Neurological: Negative.    Hematological: Negative.    Psychiatric/Behavioral: Negative.        Past Medical History:   Diagnosis Date   • Chronic kidney disease    • Diabetes mellitus ([facility])    • GERD (gastroesophageal reflux disease)    • HFrEF (heart failure with reduced ejection fraction) ([facility])    • Hypertension        Allergies   Allergen Reactions   • Levofloxacin Anaphylaxis       Past Surgical History:   Procedure Laterality Date   • HEART TRANSPLANT     • HERNIA REPAIR         Family History   Problem Relation Age of Onset   • Heart disease Mother    • Heart disease Father    • Heart disease Brother    • Diabetes Brother        Social History     Socioeconomic History   • Marital status:    Tobacco Use   • Smoking status: Former   • Smokeless tobacco: Former   Vaping Use   • Vaping Use: Never used   Substance and Sexual Activity   • Alcohol use: Never   • Drug use: Never   •  "Sexual activity: Defer           Objective    /98   Pulse 82   Temp 97.7 °F (36.5 °C) (Oral)   Resp 20   Ht 170.2 cm (67\")   Wt 85.3 kg (188 lb)   SpO2 98%   BMI 29.44 kg/m²     Physical Exam  Vitals and nursing note reviewed.   Constitutional:       General: He is not in acute distress.     Appearance: Normal appearance. He is not ill-appearing, toxic-appearing or diaphoretic.   HENT:      Head: Normocephalic.      Right Ear: External ear normal.      Left Ear: External ear normal.      Nose: Nose normal.      Mouth/Throat:      Mouth: Mucous membranes are moist.   Eyes:      Pupils: Pupils are equal, round, and reactive to light.   Cardiovascular:      Rate and Rhythm: Normal rate and regular rhythm.      Pulses: Normal pulses.      Heart sounds: Normal heart sounds.   Pulmonary:      Effort: Pulmonary effort is normal.      Breath sounds: Normal breath sounds.   Abdominal:      General: Bowel sounds are normal.      Palpations: Abdomen is soft.   Musculoskeletal:         General: Swelling and tenderness present. Normal range of motion.      Cervical back: Normal range of motion.      Comments: Swelling and tenderness to anterior aspect of right knee with limited ROM due to pain. Pedal pulse, motor, and sensory intact. Tenderness to left shoulder and promixal humerus without deformity or swelling appreciated. Radial pulse, motor, and sensory intact to LUE. Left lateral neck pain without spinal tenderness or deformity.    Skin:     General: Skin is warm and dry.      Capillary Refill: Capillary refill takes less than 2 seconds.   Neurological:      Mental Status: He is alert and oriented to person, place, and time.   Psychiatric:         Mood and Affect: Mood normal.         Behavior: Behavior normal.         Thought Content: Thought content normal.         Judgment: Judgment normal.         Procedures           ED Course  ED Course as of 11/14/22 1618   Mon Nov 14, 2022   1618 Spoke with patient and " family at bedside who verbalize understanding and are agreeable with discharge. [HS]      ED Course User Index  [HS] Shoulders, Andres SWANN, APRN      XR Spine Cervical 2 or 3 View    Result Date: 11/14/2022  EXAM: XR CERVICAL SPINE 2-3 VIEWS ORDERING PROVIDER: ANDRES GEORGE CLINICAL HISTORY: Pain due to fall COMPARISON STUDY: None. TECHNIQUE:  4 views of the cervical spine. FINDINGS: Limited assessment of the C7 vertebra. Unremarkable alignment of C1-C6 levels. No fracture and normal posterior element alignment from C1 to C6. Cervical vertebral bodies are normally aligned from C1 to C6. Disc spaces are narrow in fusion of cervical spine and vertebral body heights are well maintained. There are no lytic or sclerotic lesions. Craniocervical junction and odontoid are unremarkable. Perivertebral soft tissues are normal.     Limited assessment of C7 level. No acute displaced fracture, or traumatic subluxation based on current assessment from C1 to C6. Electronically signed by:  Dax Thurston MD  11/14/2022 2:55 PM CST Workstation: [id number]    XR Shoulder 2+ View Left    Result Date: 11/14/2022  Comparison: None Indication: Fall with pain Findings: Three views of the left shoulder obtained. There is normal alignment. Limited external rotation on the external rotation view. No fracture or dislocation. There is glenohumeral and acromioclavicular degenerative joint disease. There is mild narrowing of the subacromial space.     Impression: 1. No evidence of fracture. 2. Degenerative joint disease. 3. Narrowing of the subacromial space which may be associated with rotator cuff pathology. 4. There is limited external rotation on the external rotation view. Recommend clinical confirmation of normal range of motion. Recommend MRI should symptoms persist. Electronically signed by:  Dm Madrigal MD  11/14/2022 2:37 PM CST Workstation: [id number]    XR Humerus Left    Result Date: 11/14/2022  Comparison: None Indication: Fall with pain  Findings: Two views the left humerus are obtained. There is normal alignment. There is no fracture or dislocation. Degeneration of the acromioclavicular joint.     Impression: No evidence for left humerus fracture. Electronically signed by:  Dm Madrigal MD  11/14/2022 2:35 PM CST Workstation: [id number]    XR Knee 4+ View Right    Result Date: 11/14/2022  Comparison: None Indication: Fall, pain and swelling Findings: Four views of the right knee are obtained. There is demineralization. There is slight lucency of the superior aspect of the patella which is age indeterminant. Tricompartmental joint space narrowing. Moderate to large suprapatellar joint effusion. Soft tissue tissue swelling.     Impression: 1. There is lucency with ossicles at the superior pole of the patella which may reflect age-indeterminate fractured osteophyte. Correlate for point tenderness. 2. Large suprapatellar joint effusion. In the setting of recent fall findings are suspicious for occult fracture or internal derangement. CT or MRI may be performed Electronically signed by:  Dm Madrigal MD  11/14/2022 2:41 PM CST Workstation: [id number]    CT Lower Extremity Right Without Contrast    Result Date: 11/14/2022  EXAM: CT LOWER EXTREMITY WITHOUT IV CONTRAST ORDERING PROVIDER: ANDRES GEORGE CLINICAL HISTORY: Trauma due to fall COMPARISON STUDY: Radiograph of the same date TECHNIQUE: A multislice scan was obtained of the right knee joint in the axial plane without IV contrast. Reformatted images were generated in the sagittal and coronal planes. This exam was performed according to our departmental dose-optimization program, which includes automated exposure control, adjustment of the mA and/or kV according to the patient's size and/or use of iterative reconstruction technique. FINDINGS: There is a large joint effusion with no evidence of lipohemarthrosis. Intact medial and lateral femoral condyles. The medial and lateral tibial plateaus are intact.  Intact patella. Intact proximal fibula. There is no acute displaced fracture at the knee joint.  The alignment is anatomic.  No soft tissue abnormality, or radiopaque foreign body is seen.  Underlying  mild to moderate tricompartmental osteoarthritis. Probable ununited enthesophyte along the proximal patella at the quadriceps insertion. If there is concern for possible internal derangement of the knee, correlation with MRI is recommended.     No acute displaced fracture, or traumatic subluxation based on current assessment. Mild-to-moderate tricompartmental osteoarthritis. Large knee joint effusion with no evidence of lipohemarthrosis. If there is concern for possible internal derangement of the knee, correlation with MRI is recommended. Electronically signed by:  Dax Thurston MD  11/14/2022 4:02 PM [facility] Workstation: [id number]                                         [facility]    Final diagnoses:   Effusion of right knee   Acute pain of left shoulder       ED Disposition  ED Disposition     ED Disposition   Discharge    Condition   Stable    Comment   --             Ion Fischer MD  [address] [place] [address] [place] [id number] [phone]    Schedule an appointment as soon as possible for a visit   ER follow up for knee and shoulder, call for appointment         Medication List      New Prescriptions    diclofenac 75 MG EC tablet  Commonly known as: VOLTAREN  Take 1 tablet by mouth 2 (Two) Times a Day for 7 days.           Where to Get Your Medications      These medications were sent to [id number] - [place], [place] - [address] AT [facility] [address] - [phone]  - [phone]   [address] [address]    Phone: [phone]   · diclofenac 75 MG EC tablet          Heather Cunningham APRN  11/14/22 3612

## 2022-11-14 NOTE — DISCHARGE INSTRUCTIONS
Home to rest. Wear ace wrap or neoprene knee sleeve to right knee for support and compression. Limit weight bearing as much as possible. Follow up with orthopedics for knee and shoulder, call for appointment. Take voltaren as needed for pain.

## 2022-12-12 ENCOUNTER — TRANSCRIBE ORDERS (OUTPATIENT)
Dept: ORTHOPEDIC SURGERY | Facility: CLINIC | Age: 77
End: 2022-12-12

## 2022-12-12 DIAGNOSIS — M25.561 RIGHT KNEE PAIN, UNSPECIFIED CHRONICITY: Primary | ICD-10-CM

## 2022-12-21 ENCOUNTER — OFFICE VISIT (OUTPATIENT)
Dept: ORTHOPEDIC SURGERY | Facility: CLINIC | Age: 77
End: 2022-12-21

## 2022-12-21 VITALS — HEIGHT: 67 IN | BODY MASS INDEX: 29.98 KG/M2 | WEIGHT: 191 LBS

## 2022-12-21 DIAGNOSIS — M25.512 ACUTE PAIN OF LEFT SHOULDER: Primary | ICD-10-CM

## 2022-12-21 DIAGNOSIS — M24.812 INTERNAL DERANGEMENT OF LEFT SHOULDER: ICD-10-CM

## 2022-12-21 PROCEDURE — 99214 OFFICE O/P EST MOD 30 MIN: CPT | Performed by: NURSE PRACTITIONER

## 2022-12-21 PROCEDURE — 20610 DRAIN/INJ JOINT/BURSA W/O US: CPT | Performed by: NURSE PRACTITIONER

## 2022-12-21 RX ORDER — LIDOCAINE HYDROCHLORIDE 10 MG/ML
2 INJECTION, SOLUTION INFILTRATION; PERINEURAL
Status: COMPLETED | OUTPATIENT
Start: 2022-12-21 | End: 2022-12-21

## 2022-12-21 RX ORDER — TIZANIDINE 4 MG/1
4 TABLET ORAL EVERY 8 HOURS PRN
Qty: 21 TABLET | Refills: 0 | Status: SHIPPED | OUTPATIENT
Start: 2022-12-21 | End: 2022-12-28

## 2022-12-21 RX ORDER — TRIAMCINOLONE ACETONIDE 40 MG/ML
40 INJECTION, SUSPENSION INTRA-ARTICULAR; INTRAMUSCULAR
Status: COMPLETED | OUTPATIENT
Start: 2022-12-21 | End: 2022-12-21

## 2022-12-21 RX ADMIN — TRIAMCINOLONE ACETONIDE 40 MG: 40 INJECTION, SUSPENSION INTRA-ARTICULAR; INTRAMUSCULAR at 11:10

## 2022-12-21 RX ADMIN — LIDOCAINE HYDROCHLORIDE 2 ML: 10 INJECTION, SOLUTION INFILTRATION; PERINEURAL at 11:10

## 2022-12-21 NOTE — PROGRESS NOTES
Román Corey is a 77 y.o. male   Primary provider:  Cam Cortez MD (Inactive)       Chief Complaint   Patient presents with   • Left Shoulder - Pain       HISTORY OF PRESENT ILLNESS:      Mr. Corey is a 77-year-old male who presents today with complaints of left shoulder pain.  Shoulder pain started around 1 November after a fall.  He reports he fell while packing and groceries and during his fall his shoulder was yanked.  He reports pain is severe and grinding.  Pain is associated with painful popping.  He denies burning, tingling, numbness.  He had x-rays which did not show acute bony abnormality.  He reports pain with trying to lay directly on left shoulder at night.  He also reports pain is starting to creep up into his neck.      Pain  This is a chronic problem. The current episode started more than 1 year ago. The problem has been gradually worsening. Associated symptoms include arthralgias. He has tried NSAIDs and acetaminophen for the symptoms. The treatment provided mild relief.        CONCURRENT MEDICAL HISTORY:    Past Medical History:   Diagnosis Date   • Chronic kidney disease    • Diabetes mellitus ([facility])    • GERD (gastroesophageal reflux disease)    • HFrEF (heart failure with reduced ejection fraction) ([facility])    • Hypertension        Allergies   Allergen Reactions   • Levofloxacin Anaphylaxis         Current Outpatient Medications:   •  amLODIPine (NORVASC) 10 MG tablet, Take 5 mg by mouth Daily. Dose provided from recent admission to [facility], Disp: , Rfl:   •  azaTHIOprine (IMURAN) 50 MG tablet, Take 100 mg by mouth Daily., Disp: , Rfl:   •  calcitriol (ROCALTROL) 0.25 MCG capsule, Take 0.25 mcg by mouth Every Other Day., Disp: , Rfl:   •  calcium-vitamin D (OSCAL-500) 500-200 MG-UNIT per tablet, Take 1 tablet by mouth Daily., Disp: , Rfl:   •  cycloSPORINE modified (NEORAL) 100 MG capsule, Take 100 mg by mouth 2 (Two) Times a Day., Disp: , Rfl:   •  docusate sodium (COLACE) 100 MG  capsule, Take 100 mg by mouth As Needed for Constipation., Disp: , Rfl:   •  doxazosin (CARDURA) 8 MG tablet, Take 8 mg by mouth 2 (Two) Times a Day., Disp: , Rfl:   •  ezetimibe (ZETIA) 10 MG tablet, Take 10 mg by mouth Every Night., Disp: , Rfl:   •  furosemide (LASIX) 40 MG tablet, Take 0.5 tablets by mouth Daily., Disp: , Rfl:   •  gabapentin (NEURONTIN) 100 MG capsule, Take 200 mg by mouth 3 (Three) Times a Day., Disp: , Rfl:   •  insulin aspart (novoLOG) 100 UNIT/ML injection, Inject 30 Units under the skin into the appropriate area as directed 3 (Three) Times a Day., Disp: , Rfl:   •  insulin glargine (LANTUS) 100 UNIT/ML injection, Inject 30 Units under the skin into the appropriate area as directed 2 (Two) Times a Day., Disp: , Rfl:   •  isosorbide dinitrate (ISORDIL) 20 MG tablet, Take 20 mg by mouth 3 (Three) Times a Day. 8:00 am, 1:00 pm' and 6:00 pm, Disp: , Rfl:   •  metoprolol succinate XL (TOPROL-XL) 50 MG 24 hr tablet, Take 50 mg by mouth Daily., Disp: , Rfl:   •  multivitamin with minerals tablet tablet, Take 1 tablet by mouth Daily., Disp: , Rfl:   •  Omega-3 Fatty Acids (FISH OIL) 1000 MG capsule capsule, Take 1 capsule by mouth 2 (Two) Times a Day With Meals., Disp: , Rfl:   •  POTASSIUM CHLORIDE PO, Take 60 mEq by mouth Every Night. Per last admission  to [facility] in april, Disp: , Rfl:   •  rosuvastatin (CRESTOR) 20 MG tablet, Take 1 tablet by mouth Every Night., Disp: 90 tablet, Rfl: 0  •  Sennosides 15 MG chewable tablet, Chew 2 tablets 2 (Two) Times a Day As Needed., Disp: , Rfl:   •  tiZANidine (Zanaflex) 4 MG tablet, Take 1 tablet by mouth Every 8 (Eight) Hours As Needed for Muscle Spasms for up to 7 days., Disp: 21 tablet, Rfl: 0    Past Surgical History:   Procedure Laterality Date   • HEART TRANSPLANT     • HERNIA REPAIR         Family History   Problem Relation Age of Onset   • Heart disease Mother    • Heart disease Father    • Heart disease Brother    • Diabetes Brother        "  Social History     Socioeconomic History   • Marital status:    Tobacco Use   • Smoking status: Former   • Smokeless tobacco: Former   Vaping Use   • Vaping Use: Never used   Substance and Sexual Activity   • Alcohol use: Never   • Drug use: Never   • Sexual activity: Defer        Review of Systems   Constitutional: Negative.    HENT: Negative.    Eyes: Negative.    Respiratory: Negative.    Cardiovascular: Negative.    Gastrointestinal: Negative.    Endocrine: Negative.    Genitourinary: Negative.    Musculoskeletal: Positive for arthralgias.   Skin: Negative.    Allergic/Immunologic: Negative.    Neurological: Negative.    Hematological: Negative.    Psychiatric/Behavioral: Negative.    Left shoulder pain.    PHYSICAL EXAMINATION:       Ht 170.2 cm (67\")   Wt 86.6 kg (191 lb)   BMI 29.91 kg/m²     Physical Exam  Vitals and nursing note reviewed.   Constitutional:       General: He is not in acute distress.     Appearance: He is well-developed. He is not toxic-appearing.   HENT:      Head: Normocephalic.   Pulmonary:      Effort: Pulmonary effort is normal. No respiratory distress.   Skin:     General: Skin is warm and dry.   Neurological:      Mental Status: He is alert and oriented to person, place, and time.   Psychiatric:         Behavior: Behavior normal.         Thought Content: Thought content normal.         Judgment: Judgment normal.         GAIT:     []  Normal  []  Antalgic    Assistive device: []  None  []  Walker     []  Crutches  []  Cane     []  Wheelchair  []  Stretcher    Left Shoulder Exam     Range of Motion   Active abduction: 120   Extension: 30   External rotation: 80   Forward flexion: 140   Internal rotation 90 degrees: 90     Other   Erythema: absent  Sensation: normal  Pulse: present     Comments:  Negative full can test.  Positive empty can test  No evidence of infection.  Neurovascular intact.              No results found.        ASSESSMENT:    Diagnoses and all orders for " this visit:    Acute pain of left shoulder  -     Ambulatory Referral to Physical Therapy Evaluate and treat; (as indicated); Stretching, ROM, Strengthening    Internal derangement of left shoulder  -     Ambulatory Referral to Physical Therapy Evaluate and treat; (as indicated); Stretching, ROM, Strengthening    Other orders  -     Large Joint Arthrocentesis: L subacromial bursa  -     tiZANidine (Zanaflex) 4 MG tablet; Take 1 tablet by mouth Every 8 (Eight) Hours As Needed for Muscle Spasms for up to 7 days.          PLAN  Large Joint Arthrocentesis: L subacromial bursa  Date/Time: 12/21/2022 11:10 AM  Consent given by: patient  Site marked: site marked  Timeout: Immediately prior to procedure a time out was called to verify the correct patient, procedure, equipment, support staff and site/side marked as required   Supporting Documentation  Indications: pain   Procedure Details  Location: shoulder - L subacromial bursa  Preparation: Patient was prepped and draped in the usual sterile fashion  Needle size: 22 G  Approach: posterior  Medications administered: 40 mg triamcinolone acetonide 40 MG/ML; 2 mL lidocaine 1 %  Patient tolerance: patient tolerated the procedure well with no immediate complications          X-rays reviewed, no acute bony abnormality identified.  However rotator cuff tear cannot be excluded given recent injury.  Patient's most recent hemoglobin A1c was 10.3.  We discussed repeating hemoglobin A1c and ordering MRI to assess if patient is a surgical candidate.  However patient reports he is not interested in surgical management at this time and would desire to exhaust conservative options first.  After discussing risk, benefits, alternatives, patient desires to proceed with subacromial injection and Zanaflex.  Patient tolerated injection well.  Physical therapy also recommended, patient is not interested in multiple visits to formal physical therapy though he did agree to go for 1-2 visits to  teach HEP.  This order was sent to [facility].  Signs and symptoms to report and when to seek care explained to patient.  Patient verbalized understanding is to return in 4 weeks for recheck or sooner if needed.    Return in about 4 weeks (around 1/18/2023).    EMR Dragon/Transciption Disclaimer: Some of this note may be an electronic transcription/translation of spoken language to printed text.  The electronic translation of spoken language may permit erroneous, or at times, nonsensical words or phrases to be inadvertently transcribed. Although I have reviewed the note for such errors, some may still exist.       This document has been electronically signed by Liz FARFAN on December 21, 2022 12:16 CST

## 2023-02-06 ENCOUNTER — OFFICE VISIT (OUTPATIENT)
Dept: ENDOCRINOLOGY | Facility: CLINIC | Age: 78
End: 2023-02-06
Payer: OTHER GOVERNMENT

## 2023-02-06 VITALS
SYSTOLIC BLOOD PRESSURE: 120 MMHG | WEIGHT: 193 LBS | HEART RATE: 81 BPM | BODY MASS INDEX: 30.29 KG/M2 | OXYGEN SATURATION: 96 % | DIASTOLIC BLOOD PRESSURE: 60 MMHG | HEIGHT: 67 IN

## 2023-02-06 DIAGNOSIS — E11.21 DIABETIC NEPHROPATHY ASSOCIATED WITH TYPE 2 DIABETES MELLITUS: ICD-10-CM

## 2023-02-06 DIAGNOSIS — Z79.4 TYPE 2 DIABETES MELLITUS WITH HYPERGLYCEMIA, WITH LONG-TERM CURRENT USE OF INSULIN: Primary | ICD-10-CM

## 2023-02-06 DIAGNOSIS — I25.10 ASCVD (ARTERIOSCLEROTIC CARDIOVASCULAR DISEASE): ICD-10-CM

## 2023-02-06 DIAGNOSIS — E11.65 TYPE 2 DIABETES MELLITUS WITH HYPERGLYCEMIA, WITH LONG-TERM CURRENT USE OF INSULIN: Primary | ICD-10-CM

## 2023-02-06 PROCEDURE — 99204 OFFICE O/P NEW MOD 45 MIN: CPT | Performed by: INTERNAL MEDICINE

## 2023-02-06 RX ORDER — IBUPROFEN 600 MG/1
1 TABLET ORAL ONCE AS NEEDED
Qty: 1 EACH | Refills: 1 | Status: SHIPPED | OUTPATIENT
Start: 2023-02-06

## 2023-02-06 RX ORDER — CYCLOSPORINE 25 MG/1
25 CAPSULE, LIQUID FILLED ORAL DAILY
COMMUNITY

## 2023-02-06 NOTE — PROGRESS NOTES
"Chief Complaint   Patient presents with   • Diabetes       History of Present Illness      The patient is a 77-year-old  with history of heart transplant on cyclosporine and Imuran, IDDM, CVA, CKD stage III, hypertension and AGUILAR.       Doing basal , bolus insulin     ==========================================  Physical Exam  /60   Pulse 81   Ht 170.2 cm (67\")   Wt 87.5 kg (193 lb)   SpO2 96%   BMI 30.23 kg/m²   AOx3  No goiter, no carotid bruit  RRR  CTA  Right leg edema       ==========================================    Laboratory Workup    Lab Results   Component Value Date    WBC 4.43 07/17/2022    HGB 10.1 (L) 07/17/2022    HCT 30.8 (L) 07/17/2022    MCV 80.4 07/17/2022     07/17/2022       Lab Results   Component Value Date    GLUCOSE 73 07/17/2022    BUN 39 (H) 07/17/2022    CREATININE 1.84 (H) 07/17/2022    EGFR 37.1 (L) 07/17/2022    EGFRIFNONA 34 (L) 02/12/2022    BCR 21.2 07/17/2022     07/17/2022    K 5.0 07/17/2022     (H) 07/17/2022    CO2 23.0 07/17/2022    CALCIUM 7.8 (L) 07/17/2022    PHOS 4.0 07/16/2022    ALBUMIN 3.90 07/15/2022    GLOB 3.7 07/15/2022    BILITOT 0.2 07/15/2022    ALKPHOS 102 07/15/2022    AST 27 07/15/2022    ALT 17 07/15/2022    AGRATIO 1.1 07/15/2022       Lab Results   Component Value Date    EGFR 37.1 (L) 07/17/2022    EGFR 28.2 (L) 07/16/2022    EGFR 20.9 (L) 07/15/2022         No results found for: MALBCRERATIO              ==========================================  ASSESSMENT AND PLAN     Diagnosis Plan   1. Type 2 diabetes mellitus with hyperglycemia, with long-term current use of insulin (HCC)  empagliflozin (Jardiance) 10 MG tablet tablet    Continuous Blood Gluc  (FreeStyle Suad 2 [place]) device    Continuous Blood Gluc Sensor (FreeStyle Suad 2 Sensor) misc    glucagon (GLUCAGEN) 1 MG injection      2. Diabetic nephropathy associated with type 2 diabetes mellitus (HCC)        3. ASCVD (arteriosclerotic cardiovascular disease)   "            Diabetes Mellitus    --------------------------------------------------------------------------------------------------------------------------------------------    Glycemic Management   Lab Results   Component Value Date    HGBA1C 10.30 (H) 02/11/2022          Oral Medications    Add jardiance 10 mg daily    You take lasix 40 mg tablets  2 in am -- decrease to 1.5 tabs in am   1 in pm     Incretin Therapy   [facility]t since [facility] demands jardiance before GLP 1     He meets criteria for both medicines due to ASCVD and CKD     Insulin Therapy  Lantus 33 units in am - decrease to 30 units in am     Novolog , doing sliding scale , 2 to 25 units before me als   Doing pattern recognition         Glucagon  Approve for glucagon       Criteria for Approval of breanna 2 sensor     The patient has diabetes mellitus, insulin-dependent.    Our Diabetes Department has evaluated the patient in the last six months and will continue counseling on insulin adjustment.     The patient performs blood glucose testing at least four times daily with proven glucose variability from 50 to 300 mg per dl.    The patient is administering basal insulin and prandial insulin four times per day for more than six months.    The patient uses a home blood glucose monitor to assess blood glucose at least four times daily for more than six months.    The patient requires frequent adjustment of insulin treatment regimen based on blood glucose readings.    The patient has frequent variability in blood glucose readings due to activity and variability in meal content and time.     The patient has completed a diabetes education program with us.    The patient has demonstrated the ability to self-monitor their glucose.     The patient is motivated in improving diabetes control.    ==========================================================================  Microvascular Complication Monitoring    Neuropathy                       Yes   Retinopathy    yes  Renal     GFR  Stage III , CKD    Lab Results   Component Value Date    EGFR 37.1 (L) 07/17/2022    EGFR 28.2 (L) 07/16/2022    EGFR 20.9 (L) 07/15/2022     Add jardiance     Albuminuria   No results found for: EVERETT                   This document has been electronically signed by Duong Duran MD on February 6, 2023 14:49 CST

## 2023-02-13 ENCOUNTER — DOCUMENTATION (OUTPATIENT)
Dept: ENDOCRINOLOGY | Facility: CLINIC | Age: 78
End: 2023-02-13
Payer: MEDICARE

## 2023-02-13 NOTE — PROGRESS NOTES
Paperwork for approval of Suad 2 [facility] along with [facility] note. Confirmation received and sent to

## 2023-03-06 ENCOUNTER — DOCUMENTATION (OUTPATIENT)
Dept: ENDOCRINOLOGY | Facility: CLINIC | Age: 78
End: 2023-03-06
Payer: MEDICARE